# Patient Record
Sex: MALE | Race: OTHER | HISPANIC OR LATINO | Employment: OTHER | URBAN - METROPOLITAN AREA
[De-identification: names, ages, dates, MRNs, and addresses within clinical notes are randomized per-mention and may not be internally consistent; named-entity substitution may affect disease eponyms.]

---

## 2017-01-21 LAB
BASOPHILS # BLD AUTO: 34 CELLS/UL (ref 0–200)
BASOPHILS NFR BLD AUTO: 0.5 %
EOSINOPHIL # BLD AUTO: 102 CELLS/UL (ref 15–500)
EOSINOPHIL NFR BLD AUTO: 1.5 %
ERYTHROCYTE [DISTWIDTH] IN BLOOD BY AUTOMATED COUNT: 14.1 % (ref 11–15)
HCT VFR BLD AUTO: 44.7 % (ref 38.5–50)
HGB BLD-MCNC: 14.7 G/DL (ref 13.2–17.1)
LYMPHOCYTES # BLD AUTO: 1278 CELLS/UL (ref 850–3900)
LYMPHOCYTES NFR BLD AUTO: 18.8 %
MCH RBC QN AUTO: 30.1 PG (ref 27–33)
MCHC RBC AUTO-ENTMCNC: 32.8 G/DL (ref 32–36)
MCV RBC AUTO: 91.7 FL (ref 80–100)
MONOCYTES # BLD AUTO: 483 CELLS/UL (ref 200–950)
MONOCYTES NFR BLD AUTO: 7.1 %
NEUTROPHILS # BLD AUTO: 4903 CELLS/UL (ref 1500–7800)
NEUTROPHILS NFR BLD AUTO: 72.1 %
PLATELET # BLD AUTO: 203 THOUSAND/UL (ref 140–400)
PMV BLD REES-ECKER: 8.8 FL (ref 7.5–11.5)
RBC # BLD AUTO: 4.88 MILLION/UL (ref 4.2–5.8)
TSH SERPL-ACNC: 1.01 MIU/L (ref 0.4–4.5)
WBC # BLD AUTO: 6.8 THOUSAND/UL (ref 3.8–10.8)

## 2018-04-27 LAB
ABSOL LYMPHOCYTES (HISTORICAL): 1.3 K/UL (ref 0.5–4)
ALBUMIN SERPL BCP-MCNC: 3.9 G/DL (ref 3–5.2)
ALP SERPL-CCNC: 106 U/L (ref 43–122)
ALT SERPL W P-5'-P-CCNC: 61 U/L (ref 9–52)
ANION GAP SERPL CALCULATED.3IONS-SCNC: 11 MMOL/L (ref 5–14)
AST SERPL W P-5'-P-CCNC: 40 U/L (ref 17–59)
BASOPHILS # BLD AUTO: 0 % (ref 0–1)
BASOPHILS # BLD AUTO: 0 K/UL (ref 0–0.1)
BILIRUB SERPL-MCNC: 0.8 MG/DL
BUN SERPL-MCNC: 25 MG/DL (ref 5–25)
CALCIUM SERPL-MCNC: 8.5 MG/DL (ref 8.4–10.2)
CHLORIDE SERPL-SCNC: 103 MEQ/L (ref 97–108)
CHOLEST SERPL-MCNC: 122 MG/DL
CHOLEST/HDLC SERPL: 4.2 {RATIO}
CO2 SERPL-SCNC: 25 MMOL/L (ref 22–30)
COMMENT (HISTORICAL): ABNORMAL
COMMENT (HISTORICAL): ABNORMAL
CREATINE, SERUM (HISTORICAL): 1.18 MG/DL (ref 0.7–1.5)
DEPRECATED RDW RBC AUTO: 13.8 %
EGFR (HISTORICAL): >60 ML/MIN/1.73 M2
EOSINOPHIL # BLD AUTO: 0.1 K/UL (ref 0–0.4)
EOSINOPHIL NFR BLD AUTO: 1 % (ref 0–6)
GLUCOSE SERPL-MCNC: 90 MG/DL (ref 70–99)
HCT VFR BLD AUTO: 45.1 % (ref 41–53)
HDLC SERPL-MCNC: 29 MG/DL
HEPATITIS A IGM ANTIBODY (HISTORICAL): NORMAL
HEPATITIS B CORE IGM ANTIBODY (HISTORICAL): NORMAL
HEPATITIS B SURFACE AG CONFIRMATION (HISTORICAL): NORMAL
HEPATITIS C ANTIBODY (HISTORICAL): NORMAL
HGB BLD-MCNC: 14.7 G/DL (ref 13.5–17.5)
LDL/HDL RATIO (HISTORICAL): 2.2
LDLC SERPL CALC-MCNC: 65 MG/DL
LYMPHOCYTES NFR BLD AUTO: 22 % (ref 25–45)
MCH RBC QN AUTO: 30.2 PG (ref 26–34)
MCHC RBC AUTO-ENTMCNC: 32.7 % (ref 31–36)
MCV RBC AUTO: 92 FL (ref 80–100)
MONOCYTES # BLD AUTO: 0.6 K/UL (ref 0.2–0.9)
MONOCYTES NFR BLD AUTO: 10 % (ref 1–10)
NEUTROPHILS ABS COUNT (HISTORICAL): 4.1 K/UL (ref 1.8–7.8)
NEUTS SEG NFR BLD AUTO: 67 % (ref 45–65)
PLATELET # BLD AUTO: 166 K/MCL (ref 150–450)
POTASSIUM SERPL-SCNC: 3.5 MEQ/L (ref 3.6–5)
PROSTATE SPECIFIC ANTIGEN (HISTORICAL): 1.96 NG/ML
RBC # BLD AUTO: 4.89 M/MCL (ref 4.5–5.9)
RBC MORPHOLOGY (HISTORICAL): ABNORMAL
SODIUM SERPL-SCNC: 140 MEQ/L (ref 137–147)
TOTAL PROTEIN (HISTORICAL): 6.6 G/DL (ref 5.9–8.4)
TRIGL SERPL-MCNC: 142 MG/DL
TSH SERPL DL<=0.05 MIU/L-ACNC: 1.32 UIU/ML (ref 0.47–4.68)
VLDLC SERPL CALC-MCNC: 28 MG/DL (ref 0–40)
WBC # BLD AUTO: 6.1 K/MCL (ref 4.5–11)

## 2018-07-17 ENCOUNTER — OFFICE VISIT (OUTPATIENT)
Dept: FAMILY MEDICINE CLINIC | Facility: CLINIC | Age: 73
End: 2018-07-17
Payer: COMMERCIAL

## 2018-07-17 VITALS
BODY MASS INDEX: 33.31 KG/M2 | HEART RATE: 60 BPM | DIASTOLIC BLOOD PRESSURE: 70 MMHG | OXYGEN SATURATION: 98 % | SYSTOLIC BLOOD PRESSURE: 130 MMHG | TEMPERATURE: 98.1 F | WEIGHT: 188 LBS | HEIGHT: 63 IN | RESPIRATION RATE: 16 BRPM

## 2018-07-17 DIAGNOSIS — R30.0 DYSURIA: Primary | ICD-10-CM

## 2018-07-17 DIAGNOSIS — I10 ESSENTIAL HYPERTENSION: ICD-10-CM

## 2018-07-17 DIAGNOSIS — Z95.1 S/P CABG X 3: ICD-10-CM

## 2018-07-17 LAB
SL AMB  POCT GLUCOSE, UA: NORMAL
SL AMB LEUKOCYTE ESTERASE,UA: NORMAL
SL AMB POCT BILIRUBIN,UA: NORMAL
SL AMB POCT BLOOD,UA: NORMAL
SL AMB POCT CLARITY,UA: CLEAR
SL AMB POCT COLOR,UA: NORMAL
SL AMB POCT KETONES,UA: NORMAL
SL AMB POCT NITRITE,UA: NORMAL
SL AMB POCT PH,UA: 6.5
SL AMB POCT SPECIFIC GRAVITY,UA: 1
SL AMB POCT URINE PROTEIN: NORMAL
SL AMB POCT UROBILINOGEN: 0.2

## 2018-07-17 PROCEDURE — 99214 OFFICE O/P EST MOD 30 MIN: CPT | Performed by: FAMILY MEDICINE

## 2018-07-17 PROCEDURE — 81002 URINALYSIS NONAUTO W/O SCOPE: CPT | Performed by: FAMILY MEDICINE

## 2018-07-17 RX ORDER — CLOPIDOGREL BISULFATE 75 MG/1
1 TABLET ORAL EVERY 24 HOURS
COMMUNITY
Start: 2018-04-27 | End: 2019-07-01 | Stop reason: SDUPTHER

## 2018-07-17 RX ORDER — ATORVASTATIN CALCIUM 80 MG/1
1 TABLET, FILM COATED ORAL EVERY 24 HOURS
COMMUNITY
Start: 2018-04-27 | End: 2019-07-01 | Stop reason: SDUPTHER

## 2018-07-17 RX ORDER — FLUTICASONE PROPIONATE 50 MCG
SPRAY, SUSPENSION (ML) NASAL
Refills: 0 | COMMUNITY
Start: 2018-04-22 | End: 2018-09-20 | Stop reason: ALTCHOICE

## 2018-07-17 RX ORDER — CHLORAL HYDRATE 500 MG
1 CAPSULE ORAL EVERY 24 HOURS
COMMUNITY
Start: 2018-02-27 | End: 2019-01-24 | Stop reason: ALTCHOICE

## 2018-07-17 RX ORDER — METOPROLOL SUCCINATE 100 MG/1
1 TABLET, EXTENDED RELEASE ORAL EVERY 24 HOURS
COMMUNITY
Start: 2018-04-27 | End: 2019-07-11 | Stop reason: SDUPTHER

## 2018-07-17 RX ORDER — TAMSULOSIN HYDROCHLORIDE 0.4 MG/1
1 CAPSULE ORAL EVERY 24 HOURS
COMMUNITY
Start: 2018-04-27 | End: 2019-05-22 | Stop reason: SDUPTHER

## 2018-07-17 RX ORDER — VALSARTAN AND HYDROCHLOROTHIAZIDE 320; 12.5 MG/1; MG/1
1 TABLET, FILM COATED ORAL EVERY 24 HOURS
COMMUNITY
Start: 2018-04-27 | End: 2018-09-20 | Stop reason: ALTCHOICE

## 2018-07-17 RX ORDER — ASPIRIN 81 MG/1
1 TABLET, CHEWABLE ORAL EVERY 24 HOURS
COMMUNITY
Start: 2018-04-27 | End: 2019-07-11 | Stop reason: SDUPTHER

## 2018-07-17 NOTE — PROGRESS NOTES
Assessment/Plan:    No problem-specific Assessment & Plan notes found for this encounter  Diagnoses and all orders for this visit:    Dysuria  -     POCT urine dip    Other orders  -     aspirin 81 mg chewable tablet; Chew 1 tablet every 24 hours  -     atorvastatin (LIPITOR) 80 mg tablet; Take 1 tablet by mouth every 24 hours  -     Omega-3 Fatty Acids (FISH OIL) 1,000 mg; Take 1 capsule by mouth every 24 hours  -     tamsulosin (FLOMAX) 0 4 mg; Take 1 capsule by mouth every 24 hours  -     fluticasone (FLONASE) 50 mcg/act nasal spray; USE 2 SPRAYS IN EACH NOSTRIL D  -     metoprolol succinate (TOPROL-XL) 100 mg 24 hr tablet; Take 1 tablet by mouth every 24 hours  -     clopidogrel (PLAVIX) 75 mg tablet; Take 1 tablet by mouth every 24 hours  -     valsartan-hydrochlorothiazide (DIOVAN-HCT) 320-12 5 MG per tablet; Take 1 tablet by mouth every 24 hours  -     Cholecalciferol (VITAMIN D-3) 5000 units TABS; every 24 hours          Subjective:      Patient ID: Linda Romero is a 68 y o  male  Pt here for routine check  PT has no complaints  His wife reported the patient was having memory troubles and intention for this visit is to have a mini-mental exam   Patient denies any memory problems or any behavior problems he states that he is getting tired and does a one-to-one sitter to his wife  Hypertension   This is a recurrent problem  The current episode started more than 1 year ago  The problem has been gradually improving since onset  The problem is controlled  Pertinent negatives include no chest pain, headaches or shortness of breath  Risk factors for coronary artery disease include obesity, male gender and dyslipidemia  The current treatment provides moderate improvement         The following portions of the patient's history were reviewed and updated as appropriate: allergies, current medications, past family history, past medical history, past social history, past surgical history and problem list     Review of Systems   Constitutional: Negative for activity change, appetite change, fatigue and fever  HENT: Negative for congestion, dental problem, ear pain, sinus pain and trouble swallowing  Eyes: Negative for discharge, redness and itching  Respiratory: Negative for cough, shortness of breath and wheezing  Cardiovascular: Negative for chest pain  Gastrointestinal: Negative for abdominal distention and abdominal pain  Genitourinary: Negative for difficulty urinating, dysuria and enuresis  Musculoskeletal: Negative for arthralgias, back pain and gait problem  Neurological: Negative for dizziness and headaches  Hematological: Negative for adenopathy  Does not bruise/bleed easily  Psychiatric/Behavioral: Negative for behavioral problems  There is no history of childhood bulling in school  , drug use or physical abuse  Objective:      /70 (BP Location: Left arm, Patient Position: Sitting, Cuff Size: Standard)   Pulse 60   Temp 98 1 °F (36 7 °C) (Oral)   Resp 16   Ht 5' 3" (1 6 m)   Wt 85 3 kg (188 lb)   SpO2 98%   BMI 33 30 kg/m²          Physical Exam   Constitutional: He is oriented to person, place, and time  He appears well-developed and well-nourished  HENT:   Head: Normocephalic  Eyes: Pupils are equal, round, and reactive to light  Neck: Normal range of motion  Cardiovascular: Normal rate and regular rhythm  Pulmonary/Chest: Effort normal and breath sounds normal    Abdominal: Soft  Bowel sounds are normal    Genitourinary:   Genitourinary Comments: Exam of his testicles is normal   Musculoskeletal: Normal range of motion  He exhibits tenderness  Neurological: He is alert and oriented to person, place, and time  Skin: Skin is warm and dry  Psychiatric: He has a normal mood and affect   His speech is normal and behavior is normal  Judgment and thought content normal  Cognition and memory are normal    The mini-mental exam done today reported as score of 30  No signs of memory impairment  There is no signs of behavior disorder

## 2018-07-18 NOTE — ASSESSMENT & PLAN NOTE
Hypertension isn't very well controlled  Will continue same treatment  Mini-mental status exam was normal   Patient does not have any memory problems at this time

## 2018-08-21 ENCOUNTER — TELEPHONE (OUTPATIENT)
Dept: FAMILY MEDICINE CLINIC | Facility: CLINIC | Age: 73
End: 2018-08-21

## 2018-08-21 DIAGNOSIS — I10 ESSENTIAL HYPERTENSION, MALIGNANT: Primary | ICD-10-CM

## 2018-08-21 NOTE — TELEPHONE ENCOUNTER
Pt came to the office and stated he is taking valsartan HCTZ 320 mg/ 12 5 mg  He needs a replacement for the medication due to the recall  Please send to [address] [place] [address] pharmacy with the new medication  Call him when the change has been made

## 2018-08-22 RX ORDER — OLMESARTAN MEDOXOMIL AND HYDROCHLOROTHIAZIDE 40/12.5 40; 12.5 MG/1; MG/1
1 TABLET ORAL DAILY
Qty: 30 TABLET | Refills: 5 | Status: SHIPPED | OUTPATIENT
Start: 2018-08-22 | End: 2018-09-20 | Stop reason: SDUPTHER

## 2018-09-20 ENCOUNTER — OFFICE VISIT (OUTPATIENT)
Dept: CARDIOLOGY CLINIC | Facility: CLINIC | Age: 73
End: 2018-09-20
Payer: COMMERCIAL

## 2018-09-20 VITALS
WEIGHT: 190 LBS | BODY MASS INDEX: 33.66 KG/M2 | HEART RATE: 56 BPM | HEIGHT: 63 IN | DIASTOLIC BLOOD PRESSURE: 72 MMHG | OXYGEN SATURATION: 98 % | SYSTOLIC BLOOD PRESSURE: 124 MMHG

## 2018-09-20 DIAGNOSIS — I25.10 CORONARY ARTERY DISEASE INVOLVING NATIVE CORONARY ARTERY OF NATIVE HEART WITHOUT ANGINA PECTORIS: Primary | ICD-10-CM

## 2018-09-20 DIAGNOSIS — Z95.1 S/P CABG X 3: ICD-10-CM

## 2018-09-20 DIAGNOSIS — E78.00 PURE HYPERCHOLESTEROLEMIA: ICD-10-CM

## 2018-09-20 DIAGNOSIS — I10 ESSENTIAL HYPERTENSION, MALIGNANT: ICD-10-CM

## 2018-09-20 DIAGNOSIS — I10 HYPERTENSION, UNSPECIFIED TYPE: ICD-10-CM

## 2018-09-20 DIAGNOSIS — R06.02 SOB (SHORTNESS OF BREATH): ICD-10-CM

## 2018-09-20 DIAGNOSIS — I25.2 OLD MYOCARDIAL INFARCTION: ICD-10-CM

## 2018-09-20 PROCEDURE — 99214 OFFICE O/P EST MOD 30 MIN: CPT | Performed by: INTERNAL MEDICINE

## 2018-09-20 PROCEDURE — 93000 ELECTROCARDIOGRAM COMPLETE: CPT | Performed by: INTERNAL MEDICINE

## 2018-09-20 NOTE — PROGRESS NOTES
Cardiology Follow Up    [facility]  1945  [id number]  [address] [facility]  [address] [place], [facility] [address] [phone]    1  Hypertension, unspecified type  POCT ECG    Echo complete with contrast if indicated   2  SOB (shortness of breath)  Echo complete with contrast if indicated   3  S/P CABG x 3     4  Coronary artery disease involving native coronary artery of native heart without angina pectoris     5  Pure hypercholesterolemia     6  Old myocardial infarction         Patient was 1st seen in our office on March 5, 2013 for midsternal chest discomfort and dyspnea on exertion  His electrocardiogram demonstrated old inferior wall myocardial infarction with possible lateral extension  Other diagnoses include hypertension, metabolic syndrome, hyperlipidemia  Cardiac catheterization demonstrated a 70 80% left anterior after the 2nd diagonal branch  Circumflex artery minor luminal irregularities  Right coronary artery dominant in distribution with a proximal 90% lesion  Patient underwent coronary artery bypass surgery with a LIMA to the LAD, SVG to the right coronary artery, SVG to the OM branch of the circumflex  03/19/2013 echocardiogram:  Normal left ventricular systolic and diastolic function  Mildly elevated left ventricular filling pressures  Aortic sclerosis with mild aortic regurgitation  Moderately severe concentric left ventricular hypertrophy  10/21/2017 lipid profile:  Cholesterol 144, triglycerides 115, HDL 39, LDL calculated 82  Interval History:   Patient returns having no cardiac complaints  Although on further questioning, he states he has some shortness of breath on exertion and he feels that is worse than it was 6 months ago  Blood pressure is well controlled at 124/72  Patient denies chest discomfort or shortness of breath    There are no symptoms of palpitations, lightheadedness, near-syncope or syncope  Patient Active Problem List   Diagnosis    CAD (coronary artery disease)    Diverticulosis    History of erectile dysfunction    History of tobacco use    Hypertension    Hyperlipidemia    Old myocardial infarction    S/P CABG x 3    Thrombocytopenia (HCC)     Past Medical History:   Diagnosis Date    CAD (coronary artery disease)     Diverticulosis     Hyperlipidemia     Hypertension     Obesity      Social History     Social History    Marital status: /Civil Union     Spouse name: N/A    Number of children: N/A    Years of education: N/A     Occupational History    Not on file       Social History Main Topics    Smoking status: Former Smoker     Types: Cigarettes     Quit date: 1/1/2012    Smokeless tobacco: Never Used    Alcohol use Yes    Drug use: No    Sexual activity: Yes     Partners: Female     Other Topics Concern    Not on file     Social History Narrative    No narrative on file      Family History   Problem Relation Age of Onset    Hypertension Mother      Past Surgical History:   Procedure Laterality Date    CARDIOVASCULAR STRESS TEST  03/20/2013    COLONOSCOPY      onset: 9/21/9    COLONOSCOPY W/ POLYPECTOMY      2/6/17:colon,polyp      CORONARY ARTERY BYPASS GRAFT  01/21/2016    x3       Current Outpatient Prescriptions:     aspirin 81 mg chewable tablet, Chew 1 tablet every 24 hours, Disp: , Rfl:     atorvastatin (LIPITOR) 80 mg tablet, Take 1 tablet by mouth every 24 hours, Disp: , Rfl:     clopidogrel (PLAVIX) 75 mg tablet, Take 1 tablet by mouth every 24 hours, Disp: , Rfl:     metoprolol succinate (TOPROL-XL) 100 mg 24 hr tablet, Take 1 tablet by mouth every 24 hours, Disp: , Rfl:     olmesartan-hydrochlorothiazide (BENICAR HCT) 40-12 5 MG per tablet, Take 1 tablet by mouth daily for 30 days, Disp: 30 tablet, Rfl: 5    Omega-3 Fatty Acids (FISH OIL) 1,000 mg, Take 1 capsule by mouth every 24 hours, Disp: , Rfl:     tamsulosin (FLOMAX) 0 4 mg, Take 1 capsule by mouth every 24 hours, Disp: , Rfl:   Allergies   Allergen Reactions    No Active Allergies        Results for orders placed or performed in visit on 09/20/18   POCT ECG    Narrative    Sinus bradycardia with premature atrial complexes  Old inferior wall myocardial infarction  First degree AV block  Lab Results   Component Value Date    CHOL 122 04/27/2018     Lab Results   Component Value Date    HDL 29 (L) 04/27/2018     Lab Results   Component Value Date    LDLCALC 65 04/27/2018     Lab Results   Component Value Date    TRIG 142 04/27/2018     No components found for: [place], [place]  Lab Results   Component Value Date    GLUCOSE 90 04/27/2018    CALCIUM 8 5 04/27/2018     04/27/2018    K 3 5 (L) 04/27/2018    CO2 25 04/27/2018     04/27/2018    BUN 25 04/27/2018     Lab Results   Component Value Date     04/27/2018    K 3 5 (L) 04/27/2018     04/27/2018    CO2 25 04/27/2018    ANIONGAP 11 04/27/2018    BUN 25 04/27/2018    GLUCOSE 90 04/27/2018    CALCIUM 8 5 04/27/2018    AST 40 04/27/2018    ALT 61 (H) 04/27/2018    ALKPHOS 106 04/27/2018    PROT 6 6 04/27/2018    BILITOT 0 8 04/27/2018     Lab Results   Component Value Date    WBC 6 1 04/27/2018    HGB 14 7 04/27/2018    HCT 45 1 04/27/2018    MCV 92 04/27/2018     04/27/2018     Lab Results   Component Value Date    VHW8NVLRLRNC 1 320 04/27/2018             Review of Systems:  Review of Systems   Constitutional: Negative  HENT: Negative  Eyes: Negative  Respiratory: Negative for chest tightness and shortness of breath  Cardiovascular: Negative for chest pain, palpitations and leg swelling  Gastrointestinal: Negative  Endocrine: Negative  Musculoskeletal: Negative  Skin: Negative  Allergic/Immunologic: Negative  Neurological: Negative  Hematological: Negative  Psychiatric/Behavioral: Negative          Physical Exam:  BP 124/72 (BP Location: Left arm, Patient Position: Sitting, Cuff Size: Large)   Pulse 56   Ht 5' 3" (1 6 m)   Wt 86 2 kg (190 lb)   SpO2 98%   BMI 33 66 kg/m²    Physical Exam   Constitutional: He is oriented to person, place, and time  He appears well-developed and well-nourished  HENT:   Head: Normocephalic and atraumatic  Neck: Normal range of motion  Neck supple  No JVD present  No tracheal deviation present  Cardiovascular: Normal rate, regular rhythm, normal heart sounds and intact distal pulses  Pulmonary/Chest: Effort normal and breath sounds normal  No respiratory distress  He has no wheezes  He has no rales  Abdominal: Soft  Bowel sounds are normal    Musculoskeletal: He exhibits no edema  Neurological: He is alert and oriented to person, place, and time  Skin: Skin is warm and dry  Psychiatric: He has a normal mood and affect  His behavior is normal        Discussion/Summary:  1  Coronary artery disease, status post CABG x3, asymptomatic  2  Hypertension  3  Hyperlipidemia  4  Deconditioning  Plan:  1  Recommend the patient get regular exercise and in the hot weather got cold to a mall and walk around  He should exercise 5 days a week  2   Echocardiogram  3    Return in 4 months

## 2018-09-21 RX ORDER — OLMESARTAN MEDOXOMIL AND HYDROCHLOROTHIAZIDE 40/12.5 40; 12.5 MG/1; MG/1
1 TABLET ORAL DAILY
Qty: 90 TABLET | Refills: 1 | Status: SHIPPED | OUTPATIENT
Start: 2018-09-21 | End: 2019-01-07 | Stop reason: ALTCHOICE

## 2018-10-04 ENCOUNTER — HOSPITAL ENCOUNTER (OUTPATIENT)
Dept: NON INVASIVE DIAGNOSTICS | Facility: HOSPITAL | Age: 73
Discharge: HOME | End: 2018-10-04
Payer: COMMERCIAL

## 2018-10-04 DIAGNOSIS — I10 HYPERTENSION, UNSPECIFIED TYPE: ICD-10-CM

## 2018-10-04 DIAGNOSIS — R06.02 SOB (SHORTNESS OF BREATH): ICD-10-CM

## 2018-10-04 PROCEDURE — 93306 TTE W/DOPPLER COMPLETE: CPT | Performed by: INTERNAL MEDICINE

## 2018-10-04 PROCEDURE — 93306 TTE W/DOPPLER COMPLETE: CPT

## 2018-11-09 ENCOUNTER — OFFICE VISIT (OUTPATIENT)
Dept: FAMILY MEDICINE CLINIC | Facility: CLINIC | Age: 73
End: 2018-11-09
Payer: COMMERCIAL

## 2018-11-09 VITALS
WEIGHT: 195 LBS | DIASTOLIC BLOOD PRESSURE: 80 MMHG | BODY MASS INDEX: 34.55 KG/M2 | HEIGHT: 63 IN | SYSTOLIC BLOOD PRESSURE: 150 MMHG | OXYGEN SATURATION: 95 % | TEMPERATURE: 98 F | RESPIRATION RATE: 16 BRPM | HEART RATE: 62 BPM

## 2018-11-09 DIAGNOSIS — R00.1 BRADYCARDIA: ICD-10-CM

## 2018-11-09 DIAGNOSIS — Z23 NEEDS FLU SHOT: Primary | ICD-10-CM

## 2018-11-09 DIAGNOSIS — I10 ESSENTIAL HYPERTENSION: ICD-10-CM

## 2018-11-09 DIAGNOSIS — R73.9 HYPERGLYCEMIA: ICD-10-CM

## 2018-11-09 PROCEDURE — 90662 IIV NO PRSV INCREASED AG IM: CPT

## 2018-11-09 PROCEDURE — G0008 ADMIN INFLUENZA VIRUS VAC: HCPCS

## 2018-11-09 PROCEDURE — 4040F PNEUMOC VAC/ADMIN/RCVD: CPT

## 2018-11-09 PROCEDURE — 96372 THER/PROPH/DIAG INJ SC/IM: CPT | Performed by: FAMILY MEDICINE

## 2018-11-09 PROCEDURE — 99214 OFFICE O/P EST MOD 30 MIN: CPT | Performed by: FAMILY MEDICINE

## 2018-11-09 NOTE — PROGRESS NOTES
Assessment/Plan:  1  Bradycardia  Condition is stable with current treatment, to  continue the same  Back to normal HR with Multiples PAC's more than previously  - POCT ECG  - Magnesium; Future    2  Needs flu shot    - influenza vaccine, [id number], high-dose, PF 0 5 mL, for patients 72 yr+ (FLUZONE HIGH-DOSE)    3  Essential hypertension  Patient has h/o CABG, his life is more sedentary and does not aggregates a better prognosis to his future  Poor exercise, to get to gym daily since he has a membership    patient got explanation of the risks associated with HTN and the need for adequate control and adherence to therapy  Explained to patient that therapeutic measure is lifelong lifestyle modification including:  Sodium reduction (<2 g/day)  Dietary Approaches to Stop Hypertension (DASH) diet (3 servings of fruit and vegetables daily, whole grains, low sodium, low-fat proteins)   Weight loss to BMI under 30 kg/m^2  Increased physical activity: 3 to 5 times/week of daily aerobic exercise for 30- to 50-minute sessions as tolerated   Limited alcohol consumption less than 5 drinks for week  Patient Understood this basic measures and committed to make changes in his life style  - Comprehensive metabolic panel; Future  - Lipid Panel with Direct LDL reflex; Future    4  Hyperglycemia  Patient has elevation of blood sugar without diagnosis of diabetes  Are going to check hemoglobin A1c today  - Hemoglobin A1C; Future    No problem-specific Assessment & Plan notes found for this encounter  Diagnoses and all orders for this visit:    Needs flu shot  -     influenza vaccine, [id number], high-dose, PF 0 5 mL, for patients 65 yr+ (FLUZONE HIGH-DOSE)    Bradycardia  -     POCT ECG          Subjective:      Patient ID: Dar Patino is a 68 y o  male  Pt here for routine followup and for complaints of increased left knee pain      Hypertension   Associated symptoms include shortness of breath   Pertinent negatives include no chest pain or headaches  Knee Pain    The pain is present in the left knee  The pain is at a severity of 4/10  The pain is moderate  The pain has been fluctuating since onset  The symptoms are aggravated by movement  He has tried acetaminophen for the symptoms  The treatment provided no relief  The following portions of the patient's history were reviewed and updated as appropriate: allergies, current medications, past family history, past medical history, past social history, past surgical history and problem list       Review of Systems   Constitutional: Positive for unexpected weight change (gaining 7 lbs since last 3 months ago visit  Correlates with the starting of retairment  )  Negative for activity change, appetite change, fatigue and fever  HENT: Negative for congestion, dental problem, ear pain, sinus pain and trouble swallowing  Eyes: Negative for discharge, redness and itching  Respiratory: Positive for shortness of breath  Negative for cough and wheezing  Cardiovascular: Negative for chest pain  Gastrointestinal: Negative for abdominal distention and abdominal pain  Abdominal obesity  Genitourinary: Negative for difficulty urinating, dysuria and enuresis  Musculoskeletal: Negative for arthralgias, back pain and gait problem  Left knee pain   Neurological: Negative for dizziness and headaches  Hematological: Negative for adenopathy  Does not bruise/bleed easily  Psychiatric/Behavioral: Negative for behavioral problems  Objective:      /80 (BP Location: Left arm, Patient Position: Sitting, Cuff Size: Standard)   Pulse 62   Temp 98 °F (36 7 °C) (Oral)   Resp 16   Ht 5' 3" (1 6 m)   Wt 88 5 kg (195 lb)   SpO2 95%   BMI 34 54 kg/m²          Physical Exam   Constitutional: He is oriented to person, place, and time  Cardiovascular: Normal rate, regular rhythm and normal heart sounds      Pulmonary/Chest: Effort normal and breath sounds normal    Abdominal: Soft  Bowel sounds are normal  He exhibits distension (poor muscle wall, waisting (cause of protuberance))  Musculoskeletal: He exhibits tenderness (of back and joints, mildly to exam )  Neurological: He is alert and oriented to person, place, and time  He has normal reflexes  Skin: Skin is warm and dry  Psychiatric: He has a normal mood and affect   His behavior is normal  Judgment and thought content normal

## 2018-11-09 NOTE — PATIENT INSTRUCTIONS
Actividad sin levantamiento de peso   LO QUE NECESITA SABER:   La actividad sin levantamiento de peso es el ejercicio o movimiento que puede hacer sin apoyarse en stephens propio peso  Por ejemplo, stephens peso se apoya en el agua cuando nada  Las actividades sin levantamiento de peso no provocarán un impacto o esfuerzo  Usted puede aumentar stephens fuerza, flexibilidad, brenda cardiovascular y equilibrio con las actividades sin levantamiento de peso  Usted también puede [employer] músculos se pongan tensos o cortos después de grady lesión  Es posible que usted necesite realizar actividades sin levantamiento de peso hasta que grady herida sane, o es posible que usted necesite continuar si tiene grady condición a prolongada  Stephens médico le indicará por cuánto tiempo continuar haciendo actividades sin levantamiento de [place]  INSTRUCCIONES SOBRE [facility]:   Pregúntele a stephens Gustavo German vitaminas y minerales son adecuados para usted  · Stephens dolor empeora  · Usted tiene un dolor nuevo  · Usted tiene preguntas o inquietudes acerca de stephens condición o cuidado    Por qué usted podría necesitar actividades sin levantamiento de peso:   · Reposo de grady o ambas piernas o rodillas después de grady lesión o grady [place]    · Huesos débiles o frágiles que se quebrarían si usted pusiera demasiado peso en ellos    · El dolor en la articulación debido al sobrepeso o a la artritis hacen que las actividades con levantamiento de peso zahida dolorosas    · Edad avanzada    · Movilidad limitada en jose a piernas    · Falta de cartílago en grady o ambas rodillas    · Complicaciones de la diabetes brent la neuropatía aumentan el riesgo de caídas y hacen difícil caminar    · Dolor de espalda a michaela plazo  Ejemplos de actividades sin levantamiento de peso:   · Nadar, aeróbicos acuáticos o remar    · Pasearse en bicicleta o usar grady bicicleta estacionaria    · Levantar pesas o usar bandas de resistencia mientras está sentado    · Usar grady bicicleta de [place] solo para trabajar la parte superior del cuerpo    · Ejercicios de rango de movimiento para flexibilidad de las articulaciones    · Ejercicios isométricos que contraen y relajan los músculos varias veces seguidas    · Ciertas poses de yoga que no necesitan que apoye stephens peso  Acuda a la terapia física brent se le indique: Un fisioterapeuta puede enseñarle actividades sin levantamiento de peso para [employer] músculos y mejorar el equilibrio  Él le mostrará cómo evitar lesiones y disminuir stephens riesgo de caídas josé manuel las actividades sin levantamiento de [place]  Por ejemplo, le puede [employer] usar dispositivos de apoyo brent muletas para apoyar stephens peso  También le ayudará a planear actividades sin levantamiento de peso que aumenten stephens ritmo cardíaco para ejercicios cardiovasculares  Los adultos deberían tratar de hacer al menos 150 minutos de ejercicio a la semana  Lo que necesita saber acerca de la seguridad:   · Deténgase si siente dolor  No mueva stephens articulación más allá del rango normal de movimiento  Repose jose a articulaciones josé manuel un ataque si usted tiene grady enfermedad brent la artritis  No levante demasiado peso a menos que usted pueda [employer]  Es posible que usted necesite trabajar progresivamente para usar [employer]  Hable con stephens médico si siente dolor josé manuel jose a actividades  · Muévase lenta y suavemente  No realice movimientos rápidos o bruscos  · Use dispositivos de apoyo brent se le indique  Apoye stephens peso en [place], grady caminadora u otros dispositivos brent se lo indique stephens médico      · Realice grady variedad de actividades  La misma actividad cada día podría lesionar un músculo o articulación  Usted podría entrenar de más algunos músculos y no entrenar otros lo suficiente  · Hable con stephens médico acerca de las actividades seguras josé manuel el [place]  Es posible que usted no pueda levantar pesos [address]   Stephens médico puede ayudarlo a planear actividades sin levantamiento de peso de Ada Velazco  © 2017 [address] Information is for End User's use only and may not be sold, redistributed or otherwise used for commercial purposes  All illustrations and images included in CareNotes® are the copyrighted property of A D A M , Inc  or Gonzalo Mcfadden  Esta información es sólo para uso en educación  Johnson intención no es darle un consejo médico sobre enfermedades o tratamientos  Colsulte con johnson Charna Heckler farmacéutico antes de seguir cualquier régimen médico para saber si es seguro y efectivo para usted

## 2018-11-11 PROCEDURE — 93000 ELECTROCARDIOGRAM COMPLETE: CPT | Performed by: FAMILY MEDICINE

## 2018-11-12 ENCOUNTER — APPOINTMENT (OUTPATIENT)
Dept: LAB | Facility: HOSPITAL | Age: 73
End: 2018-11-12
Payer: COMMERCIAL

## 2018-11-12 DIAGNOSIS — R73.9 HYPERGLYCEMIA: ICD-10-CM

## 2018-11-12 DIAGNOSIS — I10 ESSENTIAL HYPERTENSION: ICD-10-CM

## 2018-11-12 DIAGNOSIS — R00.1 BRADYCARDIA: ICD-10-CM

## 2018-11-12 LAB
ALBUMIN SERPL BCP-MCNC: 3.8 G/DL (ref 3–5.2)
ALP SERPL-CCNC: 114 U/L (ref 43–122)
ALT SERPL W P-5'-P-CCNC: 41 U/L (ref 9–52)
ANION GAP SERPL CALCULATED.3IONS-SCNC: 8 MMOL/L (ref 5–14)
AST SERPL W P-5'-P-CCNC: 26 U/L (ref 17–59)
BILIRUB SERPL-MCNC: 0.6 MG/DL
BUN SERPL-MCNC: 19 MG/DL (ref 5–25)
CALCIUM SERPL-MCNC: 8.9 MG/DL (ref 8.4–10.2)
CHLORIDE SERPL-SCNC: 108 MMOL/L (ref 97–108)
CHOLEST SERPL-MCNC: 130 MG/DL
CO2 SERPL-SCNC: 28 MMOL/L (ref 22–30)
CREAT SERPL-MCNC: 1.18 MG/DL (ref 0.7–1.5)
EST. AVERAGE GLUCOSE BLD GHB EST-MCNC: 134 MG/DL
GFR SERPL CREATININE-BSD FRML MDRD: 61 ML/MIN/1.73SQ M
GLUCOSE P FAST SERPL-MCNC: 109 MG/DL (ref 70–99)
HBA1C MFR BLD: 6.3 % (ref 4.2–6.3)
HDLC SERPL-MCNC: 32 MG/DL (ref 40–59)
LDLC SERPL CALC-MCNC: 81 MG/DL
MAGNESIUM SERPL-MCNC: 2 MG/DL (ref 1.6–2.3)
POTASSIUM SERPL-SCNC: 4.4 MMOL/L (ref 3.6–5)
PROT SERPL-MCNC: 6.7 G/DL (ref 5.9–8.4)
SODIUM SERPL-SCNC: 144 MMOL/L (ref 137–147)
TRIGL SERPL-MCNC: 85 MG/DL

## 2018-11-12 PROCEDURE — 80061 LIPID PANEL: CPT

## 2018-11-12 PROCEDURE — 36415 COLL VENOUS BLD VENIPUNCTURE: CPT

## 2018-11-12 PROCEDURE — 83036 HEMOGLOBIN GLYCOSYLATED A1C: CPT

## 2018-11-12 PROCEDURE — 80053 COMPREHEN METABOLIC PANEL: CPT

## 2018-11-12 PROCEDURE — 83735 ASSAY OF MAGNESIUM: CPT

## 2018-11-13 ENCOUNTER — TELEPHONE (OUTPATIENT)
Dept: FAMILY MEDICINE CLINIC | Facility: CLINIC | Age: 73
End: 2018-11-13

## 2018-11-13 NOTE — TELEPHONE ENCOUNTER
Left message to patient to call back regarding below task    ----- Message from Tavares Baltazar MD sent at 11/12/2018  4:15 PM EST -----  Normal labs please call patient

## 2019-01-07 ENCOUNTER — OFFICE VISIT (OUTPATIENT)
Dept: FAMILY MEDICINE CLINIC | Facility: CLINIC | Age: 74
End: 2019-01-07
Payer: COMMERCIAL

## 2019-01-07 VITALS
DIASTOLIC BLOOD PRESSURE: 70 MMHG | OXYGEN SATURATION: 95 % | TEMPERATURE: 98.1 F | BODY MASS INDEX: 35.08 KG/M2 | SYSTOLIC BLOOD PRESSURE: 138 MMHG | HEART RATE: 60 BPM | RESPIRATION RATE: 16 BRPM | HEIGHT: 63 IN | WEIGHT: 198 LBS

## 2019-01-07 DIAGNOSIS — M54.6 ACUTE RIGHT-SIDED THORACIC BACK PAIN: Primary | ICD-10-CM

## 2019-01-07 PROCEDURE — 99213 OFFICE O/P EST LOW 20 MIN: CPT | Performed by: FAMILY MEDICINE

## 2019-01-07 PROCEDURE — 3725F SCREEN DEPRESSION PERFORMED: CPT | Performed by: FAMILY MEDICINE

## 2019-01-07 RX ORDER — OLMESARTAN MEDOXOMIL AND HYDROCHLOROTHIAZIDE 40/12.5 40; 12.5 MG/1; MG/1
TABLET ORAL
COMMUNITY
Start: 2018-12-22 | End: 2019-07-01 | Stop reason: SDUPTHER

## 2019-01-07 NOTE — PATIENT INSTRUCTIONS
Espasmo muscular   LO QUE NECESITA SABER:   Un espasmo muscular es grady contracción convulsiva involuntaria de cualquier músculo o de un michell de músculos  Un calambre muscular es un espasmo muscular muy doloroso  Los calambres musculares generalmente ocurren después del ejercicio intenso o josé manuel el [facility]  Estos también pueden ser provocados por ciertos medicamentos, la deshidratación, bajos niveles de calcio o magnesio u otras condiciones de [place]  Fredy Nares [facility]:   Medicamentos:  Usted podría  necesitar lo siguiente:  · AINEs (Analgésicos antiinflamatorios no esteroides)  pueden disminuir la inflamación y el dolor o la fiebre  Sugar medicamento esta disponible con o sin grady receta médica  Los AINEs pueden causar sangrado estomacal o problemas renales en ciertas personas  Si usted elva un medicamento anticoagulante, siempre pregúntele a [place] médico si los NATALI son seguros para usted  Siempre vanessa la etiqueta de sugar medicamento y [place] instrucciones  · [place] jose a medicamentos brent se le haya indicado  Consulte con [place] médico si usted graciela que [place] medicamento no le está ayudando o si presenta efectos secundarios  Infórmele si es alérgico a algún medicamento  Mantenga grady lista actualizada de los [place], las vitaminas y los productos herbales que elva  Incluya los siguientes datos de los medicamentos: cantidad, frecuencia y motivo de administración  Traiga con usted la lista o los envases de la píldoras a jose a citas de seguimiento  Lleve la lista de los medicamentos con usted en hailey de grady emergencia  Acuda a jose a consultas de control con [place] médico según le indicaron  Usted puede necesitar otros exámenes o tratamientos  También es posible que lo refieran a un fisioterapeuta u otro especialista  Anote jose a preguntas para que se acuerde de hacerlas josé manuel jose a visitas  Cuidados personales:   · El estiramiento  de [place] músculo ayuda a aliviar el calambre   También puede ser de Dami Camara mantener el músculo estirado hasta que el calambre desaparezca  · Aplique calor  para ayudar a disminuir el dolor y el espasmo muscular  Aplíquese calor en el área lesionada josé manuel 20 a 30 minutos cada 2 horas josé manuel la cantidad de [employer] indiquen  · Aplique hielo  para ayudar a disminuir la inflamación y el dolor  El hielo también puede contribuir a evitar el daño de los tejidos  Use un paquete de hielo o ponga hielo molido dentro de [employer]  Envuelva la compresa o bolsa con grady toalla y colóquela sobre [place] músculo por 15 a 20 minutos cada hora o brent se lo indicaron  · Consuma más líquidos  para ayudar a prevenir espasmos musculares causados por la deshidratación  Las bebidas atléticas pueden ayudar a reemplazar los electrolitos que pierde josé manuel el ejercicio por la sudoración  Pregunte a [place] médico sobre la cantidad de líquido que necesita letty todos los días y cuáles le recomienda  · Consuma alimentos saludables , brent frutas, verduras, granos integrales, productos lácteos bajos en grasa y proteínas bajas en grasa (carne Jay Maker, legumbres y pescado)  Si está embarazada, pregúntele a [place] médico qué alimentos son ricos en magnesio y [place]  Estos pueden ayudar para [employer] calambres josé manuel el [place]  · Dé masajes suaves sobre el músculo  para aliviar el calambre  · Respire profundo varias veces  hasta que el calambre se mejore  Acuéstese mientras respira profundo para que no sufra un mareo o desvanecimiento  Pregúntele a step[place] Sushila Gault vitaminas y minerales son adecuados para usted  · Usted presenta signos de deshidratación, brent dolor de [place] de color amarillo oscuro, ojos o boca secos o latidos cardíacos rápidos  · Usted tiene preguntas o inquietudes acerca de [place] condición o cuidado  Regrese a la lizeth de emergencias si:   · El músculo con el calambre está caliente al tacto, inflamado o enrojecido       · Usted sufre con frecuencia y sin alivio de calambres musculares en varios músculos diferentes  · Usted presenta calambres musculares con entumecimiento, cosquilleo y sensación quemante en joes a shireen y pies  © 2017 [address] Information is for End User's use only and may not be sold, redistributed or otherwise used for commercial purposes  All illustrations and images included in CareNotes® are the copyrighted property of A ANIYAH A M , Inc  or Gonzalo Mcfadden  Esta información es sólo para uso en educación  Johnson intención no es darle un consejo médico sobre enfermedades o tratamientos  Colsulte con ojhnson Ozie Sharp farmacéutico antes de seguir cualquier régimen médico para saber si es seguro y efectivo para usted

## 2019-01-07 NOTE — PROGRESS NOTES
Assessment/Plan:  1  Acute right-sided thoracic back pain  This is a muscle spasm related to gym exercise, I explained to patient the not sure of the current problem, he will continue with exercising in the daily basis  Swimming exercise was encouraged also  No problem-specific Assessment & Plan notes found for this encounter  There are no diagnoses linked to this encounter  Subjective:      Patient ID: Sachin Moore is a 68 y o  male  Pt walked in to office with complaints of back pain      Back Pain   This is a new problem  The current episode started in the past 7 days (After gym exercise)  The problem occurs constantly  The problem is unchanged  The pain is present in the thoracic spine  The quality of the pain is described as aching  The pain is at a severity of 4/10  The pain is moderate  The symptoms are aggravated by bending, lying down, position and twisting  Pertinent negatives include no abdominal pain, chest pain, dysuria, fever or headaches  Risk factors include obesity  He has tried nothing for the symptoms  Hypertension   Pertinent negatives include no chest pain, headaches or shortness of breath  The following portions of the patient's history were reviewed and updated as appropriate: allergies, current medications, past family history, past medical history, past social history, past surgical history and problem list     Review of Systems   Constitutional: Negative for activity change, appetite change, fatigue and fever  HENT: Negative for congestion, dental problem, ear pain, sinus pain and trouble swallowing  Eyes: Negative for discharge, redness and itching  Respiratory: Negative for cough, shortness of breath and wheezing  Cardiovascular: Negative for chest pain  Gastrointestinal: Negative for abdominal distention and abdominal pain  Genitourinary: Negative for difficulty urinating, dysuria and enuresis  Musculoskeletal: Positive for back pain   Negative for arthralgias and gait problem  Neurological: Negative for dizziness and headaches  Hematological: Negative for adenopathy  Does not bruise/bleed easily  Psychiatric/Behavioral: Negative for behavioral problems  Objective:      /70 (BP Location: Left arm, Patient Position: Sitting, Cuff Size: Standard)   Pulse 60   Temp 98 1 °F (36 7 °C) (Oral)   Resp 16   Ht 5' 3" (1 6 m)   Wt 89 8 kg (198 lb)   SpO2 95%   BMI 35 07 kg/m²          Physical Exam   Constitutional: He is oriented to person, place, and time  He appears well-developed and well-nourished  HENT:   Head: Normocephalic  Eyes: Pupils are equal, round, and reactive to light  EOM are normal    Neck: Neck supple  Cardiovascular: Normal rate, regular rhythm and normal heart sounds  Pulmonary/Chest: Effort normal and breath sounds normal    Abdominal: Soft  Bowel sounds are normal    Musculoskeletal: Normal range of motion  He exhibits tenderness (thoracic spine and right side)  Neurological: He is alert and oriented to person, place, and time  He has normal reflexes  Skin: Skin is warm and dry  Psychiatric: He has a normal mood and affect   His behavior is normal  Judgment and thought content normal

## 2019-01-24 ENCOUNTER — OFFICE VISIT (OUTPATIENT)
Dept: CARDIOLOGY CLINIC | Facility: CLINIC | Age: 74
End: 2019-01-24
Payer: COMMERCIAL

## 2019-01-24 VITALS
SYSTOLIC BLOOD PRESSURE: 112 MMHG | HEIGHT: 63 IN | WEIGHT: 194.4 LBS | OXYGEN SATURATION: 94 % | HEART RATE: 63 BPM | BODY MASS INDEX: 34.45 KG/M2 | DIASTOLIC BLOOD PRESSURE: 70 MMHG

## 2019-01-24 DIAGNOSIS — Z95.1 S/P CABG X 3: ICD-10-CM

## 2019-01-24 DIAGNOSIS — I25.10 CORONARY ARTERY DISEASE INVOLVING NATIVE CORONARY ARTERY OF NATIVE HEART WITHOUT ANGINA PECTORIS: Primary | ICD-10-CM

## 2019-01-24 DIAGNOSIS — I10 ESSENTIAL HYPERTENSION: ICD-10-CM

## 2019-01-24 DIAGNOSIS — E78.00 PURE HYPERCHOLESTEROLEMIA: ICD-10-CM

## 2019-01-24 PROCEDURE — 93000 ELECTROCARDIOGRAM COMPLETE: CPT | Performed by: INTERNAL MEDICINE

## 2019-01-24 PROCEDURE — 99214 OFFICE O/P EST MOD 30 MIN: CPT | Performed by: INTERNAL MEDICINE

## 2019-01-24 RX ORDER — EZETIMIBE 10 MG/1
10 TABLET ORAL DAILY
Qty: 30 TABLET | Refills: 5 | Status: SHIPPED | OUTPATIENT
Start: 2019-01-24 | End: 2019-07-11 | Stop reason: SDUPTHER

## 2019-01-24 NOTE — PROGRESS NOTES
Cardiology Follow Up    [facility]  1945  [id number]  [address] [facility]  [address] [place] [id number] [phone]    1  Coronary artery disease involving native coronary artery of native heart without angina pectoris  POCT ECG   2  S/P CABG x 3     3  Essential hypertension     4  Pure hypercholesterolemia         Patient was 1st seen in our office on March 5, 2013 for midsternal chest discomfort and dyspnea on exertion  His electrocardiogram demonstrated old inferior wall myocardial infarction with possible lateral extension  Other diagnoses include hypertension, metabolic syndrome, hyperlipidemia  Cardiac catheterization demonstrated a 70 80% left anterior after the 2nd diagonal branch  Circumflex artery minor luminal irregularities  Right coronary artery dominant in distribution with a proximal 90% lesion  Patient underwent coronary artery bypass surgery with a LIMA to the LAD, SVG to the right coronary artery, SVG to the OM branch of the circumflex  03/19/2013 echocardiogram:  Normal left ventricular systolic and diastolic function  Mildly elevated left ventricular filling pressures  Aortic sclerosis with mild aortic regurgitation  Moderately severe concentric left ventricular hypertrophy  10/21/2017 lipid profile:  Cholesterol 144, triglycerides 115, HDL 39, LDL calculated 82  Interval History:  Patient returns  His shortness of breath has improved  He denies chest discomfort lightheadedness or leg edema  He had an echocardiogram which demonstrated well-preserved systolic function with grade 3 diastolic dysfunction  The diastolic dysfunction may be the etiology of his shortness of breath      Patient Active Problem List   Diagnosis    CAD (coronary artery disease)    Diverticulosis    History of erectile dysfunction    History of tobacco use    Hypertension    Hyperlipidemia    Old myocardial infarction    S/P CABG x 3    Thrombocytopenia (HCC)     Past Medical History:   Diagnosis Date    CAD (coronary artery disease)     Diverticulosis     Hyperglycemia     Hyperlipidemia     Hypertension     Obesity      Social History     Social History    Marital status: /Civil Union     Spouse name: N/A    Number of children: N/A    Years of education: N/A     Occupational History    Not on file  Social History Main Topics    Smoking status: Former Smoker     Types: Cigarettes     Quit date: 1/1/2012    Smokeless tobacco: Never Used    Alcohol use Yes    Drug use: No    Sexual activity: Yes     Partners: Female     Other Topics Concern    Not on file     Social History Narrative    No narrative on file      Family History   Problem Relation Age of Onset    Hypertension Mother      Past Surgical History:   Procedure Laterality Date    CARDIOVASCULAR STRESS TEST  03/20/2013    COLONOSCOPY      onset: 9/21/9    COLONOSCOPY W/ POLYPECTOMY      2/6/17:colon,polyp      CORONARY ARTERY BYPASS GRAFT  01/21/2016    x3       Current Outpatient Prescriptions:     aspirin 81 mg chewable tablet, Chew 1 tablet every 24 hours, Disp: , Rfl:     atorvastatin (LIPITOR) 80 mg tablet, Take 1 tablet by mouth every 24 hours, Disp: , Rfl:     clopidogrel (PLAVIX) 75 mg tablet, Take 1 tablet by mouth every 24 hours, Disp: , Rfl:     metoprolol succinate (TOPROL-XL) 100 mg 24 hr tablet, Take 1 tablet by mouth every 24 hours, Disp: , Rfl:     olmesartan-hydrochlorothiazide (BENICAR HCT) 40-12 5 MG per tablet, , Disp: , Rfl:     tamsulosin (FLOMAX) 0 4 mg, Take 1 capsule by mouth every 24 hours, Disp: , Rfl:   Allergies   Allergen Reactions    No Active Allergies        No results found for this visit on 01/24/19        Lab Results   Component Value Date    CHOL 122 04/27/2018     Lab Results   Component Value Date    HDL 32 (L) 11/12/2018    HDL 29 (L) 04/27/2018 Lab Results   Component Value Date    LDLCALC 81 11/12/2018    LDLCALC 65 04/27/2018     Lab Results   Component Value Date    TRIG 85 11/12/2018    TRIG 142 04/27/2018     No results found for: CHOLHDL  Lab Results   Component Value Date    GLUCOSE 90 04/27/2018    CALCIUM 8 9 11/12/2018     04/27/2018    K 4 4 11/12/2018    CO2 28 11/12/2018     11/12/2018    BUN 19 11/12/2018    CREATININE 1 18 11/12/2018     Lab Results   Component Value Date     04/27/2018    K 4 4 11/12/2018     11/12/2018    CO2 28 11/12/2018    ANIONGAP 11 04/27/2018    BUN 19 11/12/2018    CREATININE 1 18 11/12/2018    GLUCOSE 90 04/27/2018    GLUF 109 (H) 11/12/2018    CALCIUM 8 9 11/12/2018    AST 26 11/12/2018    ALT 41 11/12/2018    ALKPHOS 114 11/12/2018    PROT 6 6 04/27/2018    BILITOT 0 8 04/27/2018    EGFR 61 11/12/2018     Lab Results   Component Value Date    WBC 6 1 04/27/2018    HGB 14 7 04/27/2018    HCT 45 1 04/27/2018    MCV 92 04/27/2018     04/27/2018     Lab Results   Component Value Date    RLN5DQWUUGPO 1 320 04/27/2018       Echocardiogram:  1  Sinus rhythm with premature ventricular contractions  2  Good technical quality  3  Normal left ventricular systolic function, EF 27%  4  Grade 3 diastolic left ventricular dysfunction  5  Moderate concentric left ventricular hypertrophy  6  Mild biatrial enlargement  7  Aortic sclerosis without stenosis  Review of Systems:  Review of Systems   Constitutional: Negative for activity change  Respiratory: Negative for cough, chest tightness, shortness of breath and wheezing  Cardiovascular: Negative for chest pain, palpitations and leg swelling  Musculoskeletal: Negative for gait problem  Skin: Negative for color change  Neurological: Negative for dizziness, tremors, syncope, weakness, light-headedness and headaches  Psychiatric/Behavioral: Negative for agitation and confusion         Physical Exam:  /70 (BP Location: Left arm, Patient Position: Sitting, Cuff Size: Adult)   Pulse 63   Ht 5' 3" (1 6 m)   Wt 88 2 kg (194 lb 6 4 oz)   SpO2 94%   BMI 34 44 kg/m²    Physical Exam   Constitutional: He is oriented to person, place, and time  He appears well-developed and well-nourished  No distress  HENT:   Head: Normocephalic and atraumatic  Neck: No JVD present  Cardiovascular: Normal rate, regular rhythm, normal heart sounds and intact distal pulses  Exam reveals no gallop and no friction rub  No murmur heard  Pulmonary/Chest: Effort normal and breath sounds normal  No respiratory distress  He has no wheezes  He has no rales  He exhibits no tenderness  Abdominal: Soft  Bowel sounds are normal  He exhibits no distension  Musculoskeletal: He exhibits no edema  Neurological: He is alert and oriented to person, place, and time  Skin: Skin is warm and dry  Psychiatric: He has a normal mood and affect  His behavior is normal        Discussion/Summary:  1  Begin Zetia 10 mg daily in addition to atorvastatin  2  Return in 6 months

## 2019-04-11 ENCOUNTER — OFFICE VISIT (OUTPATIENT)
Dept: FAMILY MEDICINE CLINIC | Facility: CLINIC | Age: 74
End: 2019-04-11
Payer: COMMERCIAL

## 2019-04-11 VITALS
OXYGEN SATURATION: 97 % | BODY MASS INDEX: 34.38 KG/M2 | HEIGHT: 63 IN | TEMPERATURE: 98 F | DIASTOLIC BLOOD PRESSURE: 60 MMHG | RESPIRATION RATE: 16 BRPM | HEART RATE: 60 BPM | SYSTOLIC BLOOD PRESSURE: 110 MMHG | WEIGHT: 194 LBS

## 2019-04-11 DIAGNOSIS — R00.1 SINUS BRADYCARDIA: ICD-10-CM

## 2019-04-11 DIAGNOSIS — I10 ESSENTIAL HYPERTENSION: ICD-10-CM

## 2019-04-11 DIAGNOSIS — Z95.1 S/P CABG X 3: Primary | ICD-10-CM

## 2019-04-11 PROCEDURE — 99214 OFFICE O/P EST MOD 30 MIN: CPT | Performed by: FAMILY MEDICINE

## 2019-04-11 PROCEDURE — 93000 ELECTROCARDIOGRAM COMPLETE: CPT | Performed by: FAMILY MEDICINE

## 2019-05-22 DIAGNOSIS — N40.0 BENIGN PROSTATIC HYPERPLASIA WITHOUT LOWER URINARY TRACT SYMPTOMS: Primary | ICD-10-CM

## 2019-05-24 RX ORDER — TAMSULOSIN HYDROCHLORIDE 0.4 MG/1
0.4 CAPSULE ORAL EVERY 24 HOURS
Qty: 90 CAPSULE | Refills: 1 | Status: SHIPPED | OUTPATIENT
Start: 2019-05-24 | End: 2019-07-11 | Stop reason: SDUPTHER

## 2019-07-01 DIAGNOSIS — I25.10 CORONARY ARTERY DISEASE INVOLVING NATIVE CORONARY ARTERY OF NATIVE HEART WITHOUT ANGINA PECTORIS: Primary | ICD-10-CM

## 2019-07-01 DIAGNOSIS — I10 ESSENTIAL HYPERTENSION: ICD-10-CM

## 2019-07-01 DIAGNOSIS — E78.00 PURE HYPERCHOLESTEROLEMIA: ICD-10-CM

## 2019-07-01 RX ORDER — OLMESARTAN MEDOXOMIL AND HYDROCHLOROTHIAZIDE 40/12.5 40; 12.5 MG/1; MG/1
1 TABLET ORAL DAILY
Qty: 90 TABLET | Refills: 3 | Status: SHIPPED | OUTPATIENT
Start: 2019-07-01 | End: 2019-07-11 | Stop reason: SDUPTHER

## 2019-07-01 RX ORDER — ATORVASTATIN CALCIUM 80 MG/1
80 TABLET, FILM COATED ORAL EVERY 24 HOURS
Qty: 90 TABLET | Refills: 3 | Status: SHIPPED | OUTPATIENT
Start: 2019-07-01 | End: 2019-07-11 | Stop reason: SDUPTHER

## 2019-07-01 RX ORDER — CLOPIDOGREL BISULFATE 75 MG/1
75 TABLET ORAL EVERY 24 HOURS
Qty: 90 TABLET | Refills: 3 | Status: SHIPPED | OUTPATIENT
Start: 2019-07-01 | End: 2019-07-11 | Stop reason: SDUPTHER

## 2019-07-11 ENCOUNTER — OFFICE VISIT (OUTPATIENT)
Dept: FAMILY MEDICINE CLINIC | Facility: CLINIC | Age: 74
End: 2019-07-11
Payer: COMMERCIAL

## 2019-07-11 VITALS
DIASTOLIC BLOOD PRESSURE: 60 MMHG | BODY MASS INDEX: 34.73 KG/M2 | HEIGHT: 63 IN | OXYGEN SATURATION: 97 % | RESPIRATION RATE: 16 BRPM | HEART RATE: 60 BPM | TEMPERATURE: 98 F | WEIGHT: 196 LBS | SYSTOLIC BLOOD PRESSURE: 126 MMHG

## 2019-07-11 DIAGNOSIS — Z00.01 ENCOUNTER FOR GENERAL ADULT MEDICAL EXAMINATION WITH ABNORMAL FINDINGS: Primary | ICD-10-CM

## 2019-07-11 DIAGNOSIS — R73.9 HYPERGLYCEMIA: ICD-10-CM

## 2019-07-11 DIAGNOSIS — I25.10 CORONARY ARTERY DISEASE INVOLVING NATIVE CORONARY ARTERY OF NATIVE HEART WITHOUT ANGINA PECTORIS: ICD-10-CM

## 2019-07-11 DIAGNOSIS — Z13.6 SCREENING FOR AAA (ABDOMINAL AORTIC ANEURYSM): ICD-10-CM

## 2019-07-11 DIAGNOSIS — Z12.5 SCREENING FOR PROSTATE CANCER: ICD-10-CM

## 2019-07-11 DIAGNOSIS — N40.0 BENIGN PROSTATIC HYPERPLASIA WITHOUT LOWER URINARY TRACT SYMPTOMS: ICD-10-CM

## 2019-07-11 DIAGNOSIS — I10 ESSENTIAL HYPERTENSION: ICD-10-CM

## 2019-07-11 DIAGNOSIS — E78.00 PURE HYPERCHOLESTEROLEMIA: ICD-10-CM

## 2019-07-11 PROCEDURE — G0439 PPPS, SUBSEQ VISIT: HCPCS | Performed by: FAMILY MEDICINE

## 2019-07-11 PROCEDURE — 1170F FXNL STATUS ASSESSED: CPT | Performed by: FAMILY MEDICINE

## 2019-07-11 PROCEDURE — 99214 OFFICE O/P EST MOD 30 MIN: CPT | Performed by: FAMILY MEDICINE

## 2019-07-11 PROCEDURE — 1125F AMNT PAIN NOTED PAIN PRSNT: CPT | Performed by: FAMILY MEDICINE

## 2019-07-11 RX ORDER — TAMSULOSIN HYDROCHLORIDE 0.4 MG/1
0.4 CAPSULE ORAL EVERY 24 HOURS
Qty: 90 CAPSULE | Refills: 3 | Status: SHIPPED | OUTPATIENT
Start: 2019-07-11 | End: 2020-07-13 | Stop reason: SDUPTHER

## 2019-07-11 RX ORDER — ASPIRIN 81 MG/1
81 TABLET, CHEWABLE ORAL EVERY 24 HOURS
Qty: 90 TABLET | Refills: 3 | Status: SHIPPED | OUTPATIENT
Start: 2019-07-11 | End: 2020-07-13 | Stop reason: SDUPTHER

## 2019-07-11 RX ORDER — CLOPIDOGREL BISULFATE 75 MG/1
75 TABLET ORAL EVERY 24 HOURS
Qty: 90 TABLET | Refills: 3 | Status: SHIPPED | OUTPATIENT
Start: 2019-07-11 | End: 2020-07-13 | Stop reason: SDUPTHER

## 2019-07-11 RX ORDER — ATORVASTATIN CALCIUM 80 MG/1
80 TABLET, FILM COATED ORAL EVERY 24 HOURS
Qty: 90 TABLET | Refills: 3 | Status: SHIPPED | OUTPATIENT
Start: 2019-07-11 | End: 2020-07-13 | Stop reason: SDUPTHER

## 2019-07-11 RX ORDER — OLMESARTAN MEDOXOMIL AND HYDROCHLOROTHIAZIDE 40/12.5 40; 12.5 MG/1; MG/1
1 TABLET ORAL DAILY
Qty: 90 TABLET | Refills: 3 | Status: SHIPPED | OUTPATIENT
Start: 2019-07-11 | End: 2020-07-13 | Stop reason: SDUPTHER

## 2019-07-11 RX ORDER — EZETIMIBE 10 MG/1
10 TABLET ORAL DAILY
Qty: 90 TABLET | Refills: 3 | Status: SHIPPED | OUTPATIENT
Start: 2019-07-11 | End: 2020-07-13 | Stop reason: SDUPTHER

## 2019-07-11 RX ORDER — METOPROLOL SUCCINATE 100 MG/1
100 TABLET, EXTENDED RELEASE ORAL EVERY 24 HOURS
Qty: 90 TABLET | Refills: 3 | Status: SHIPPED | OUTPATIENT
Start: 2019-07-11 | End: 2020-07-13 | Stop reason: SDUPTHER

## 2019-07-11 NOTE — PROGRESS NOTES
Assessment and Plan:   1  Encounter for general adult medical examination with abnormal findings  During this visit we have a goal to personalize prevention  I discussed the patient about - Coronary Artery Disease-  - Hypertension-  -Hyperlipidemia  -Obesity, the need for a life style plan and decrease the impact of current problems  Health risk assessment was discussed with patient also and the ways to stay healthier  We reviewed also the current medications, the need to avoid polypharmacy in his current treatment; also about how the chronic conditions are impacting now and later  Recommended a healthy diet and exercising frequently will help to control better patient's current chronic conditions;  Immunizations, and the need to compliance with current CDC's recommendations  Patient declined at this time advanced directives  I encouraged against the use alcohol, tobacco, recreational illegal prescribed and non-prescribed drugs, Smoking status Not applicable and the use of cell phone while driving        Problem List Items Addressed This Visit     None         History of Present Illness:     Patient presents for Medicare Annual Wellness visit    Patient Care Team:  Celine Barroso MD as PCP - General (Family Medicine)     Problem List:     Patient Active Problem List   Diagnosis    CAD (coronary artery disease)    Diverticulosis    History of erectile dysfunction    History of tobacco use    Hypertension    Hyperlipidemia    Old myocardial infarction    S/P CABG x 3      Past Medical and Surgical History:     Past Medical History:   Diagnosis Date    CAD (coronary artery disease)     Diverticulosis     Hyperglycemia     Hyperlipidemia     Hypertension     Obesity      Past Surgical History:   Procedure Laterality Date    CARDIOVASCULAR STRESS TEST  03/20/2013    COLONOSCOPY      onset: 9/21/9    COLONOSCOPY W/ POLYPECTOMY      2/6/17:colon,polyp      CORONARY ARTERY BYPASS GRAFT  01/21/2016 x3      Family History:     Family History   Problem Relation Age of Onset    Hypertension Mother       Social History:     Social History     Tobacco Use   Smoking Status Former Smoker    Types: Cigarettes    Last attempt to quit: 2012    Years since quittin 5   Smokeless Tobacco Never Used     Social History     Substance and Sexual Activity   Alcohol Use Yes    Frequency: 2-4 times a month    Drinks per session: 1 or 2    Binge frequency: Never     Social History     Substance and Sexual Activity   Drug Use No      Medications and Allergies:     Current Outpatient Medications   Medication Sig Dispense Refill    aspirin 81 mg chewable tablet Chew 1 tablet every 24 hours      atorvastatin (LIPITOR) 80 mg tablet Take 1 tablet (80 mg total) by mouth every 24 hours 90 tablet 3    clopidogrel (PLAVIX) 75 mg tablet Take 1 tablet (75 mg total) by mouth every 24 hours 90 tablet 3    ezetimibe (ZETIA) 10 mg tablet Take 1 tablet (10 mg total) by mouth daily 30 tablet 5    metoprolol succinate (TOPROL-XL) 100 mg 24 hr tablet Take 1 tablet by mouth every 24 hours      olmesartan-hydrochlorothiazide (BENICAR HCT) 40-12 5 MG per tablet Take 1 tablet by mouth daily 90 tablet 3    tamsulosin (FLOMAX) 0 4 mg Take 1 capsule (0 4 mg total) by mouth every 24 hours 90 capsule 1     No current facility-administered medications for this visit        Allergies   Allergen Reactions    No Active Allergies       Immunizations:     Immunization History   Administered Date(s) Administered    H1N1, All Formulations 10/22/2009    INFLUENZA 10/14/2015, 10/14/2015, 2018    Influenza Split 10/30/2013    Influenza TIV (IM) 2011, 2012, 10/23/2014    Influenza, high dose seasonal 0 5 mL 2018    Pneumococcal Polysaccharide PPV23 2012, 2016    Tdap 2016    Zoster 2012      Medicare Screening Tests and Risk Assessments:     Last Medicare Wellness visit information reviewed, patient interviewed, no change since last AWV  Last Medicare Wellness visit information reviewed, patient interviewed and updates made to the record today  Health Risk Assessment:  Patient rates overall health as fair  Patient feels that their physical health rating is Same  Eyesight was rated as Same  Hearing was rated as Slightly worse  Patient feels that their emotional and mental health rating is Same  Pain experienced by patient in the last 7 days has been None  Patient states that he has experienced no weight loss or gain in last 6 months  Emotional/Mental Health:  Patient has not been feeling nervous/anxious  PHQ-9 Depression Screening:    Frequency of the following problems over the past two weeks:      1  Little interest or pleasure in doing things: 1 - several days      2  Feeling down, depressed, or hopeless: 1 - several days  PHQ-2 Score: 2          Broken Bones/Falls: Fall Risk Assessment:    In the past year, patient has experienced: No history of falling in past year          Bladder/Bowel:  Patient has not leaked urine accidently in the last six months  Patient reports no loss of bowel control  Immunizations:  Patient has had a flu vaccination within the last year  Patient has received a pneumonia shot  Patient has received a shingles shot  Patient has received tetanus/diphtheria shot  Home Safety:  Patient does not have trouble with stairs inside or outside of their home  Patient currently reports that there are working smoke alarms, working carbon monoxide detectors  Preventative Screenings:   prostate cancer screen performed, colon cancer screen completed, cholesterol screen completed, glaucoma eye exam completed,     Nutrition:  Current diet: Regular with servings of the following:    Medications:  Patient is not currently taking any over-the-counter supplements  Patient is able to manage medications  Lifestyle Choices:  Patient reports no tobacco use    Patient has smoked or used tobacco in the past   Patient reports alcohol use  Patient drives a vehicle  Patient wears seat belt  Current level of exercise of physical activity described by patient as: moderate  Activities of Daily Living:  Can get out of bed by his or her self, able to dress self, able to make own meals, able to do own shopping, able to bathe self, can do own laundry/housekeeping, can manage own money, pay bills and track expenses    Previous Hospitalizations:  No hospitalization or ED visit in past 12 months        Advanced Directives:  Patient has not decided on power of   Patient has not completed advanced directive  Preventative Screening/Counseling:      Cardiovascular:      General: Screening Current      Counseling: Healthy Diet and Healthy Weight          Diabetes:      General: Risks and Benefits Discussed and Screening Current      Counseling: Healthy Diet, Healthy Weight and Improve Physical Activity          Colorectal Cancer:      General: Risks and Benefits Discussed and Screening Current      Counseling: high fiber diet          Prostate Cancer:      Due for labs: PSA          Osteoporosis:      General: Screening Not Indicated          AAA:  Male patient with age over [de-identified] years  Patient has history of tobacco use  General: Risks and Benefits Discussed      Counseling: tobacco cessation counseling given      Study: Screening US          Glaucoma:      General: Screening Current          HIV:      General: Screening Not Indicated          Hepatitis C:      General: Screening Current        Advanced Directives:   Patient has no living will for healthcare, does not have durable POA for healthcare, patient does not have an advanced directive  Information on ACP and/or AD provided  5 wishes given  End of life assessment reviewed with patient  Provider agrees with end of life decisions

## 2019-07-11 NOTE — PROGRESS NOTES
Assessment/Plan:    Hypertension  Blood pressure is well control, patient will continue same treatment  Patient understands the risks associated with HTN and the need for adequate control and adherence to therapy  Explained to patient that therapeutic measure is lifelong lifestyle modification including:  Sodium reduction (<2 g/day)  Dietary Approaches to Stop Hypertension (DASH) diet (3 servings of fruit and vegetables daily, whole grains, low sodium, low-fat proteins)   Weight loss to BMI under 30 kg/m^2  Physical activity: 3 to 5 times/week of daily aerobic exercise for 30- to 50-minute sessions as tolerated   Avoid alcohol consumption  Hyperlipidemia  I reviewed previous laboratory data and consulting notes, we are going to repeat labs to ensure stability    CAD (coronary artery disease)  Condition is stable with current treatment, to  continue the same  I encouraged patient to exercise 1 hour 3 times per week       Diagnoses and all orders for this visit:    Encounter for general adult medical examination with abnormal findings  -     CBC and differential; Future  -     Comprehensive metabolic panel; Future  -     Lipid panel; Future    Hyperglycemia  -     HEMOGLOBIN A1C W/ EAG ESTIMATION; Future    Pure hypercholesterolemia  -     ezetimibe (ZETIA) 10 mg tablet; Take 1 tablet (10 mg total) by mouth daily    Coronary artery disease involving native coronary artery of native heart without angina pectoris  -     clopidogrel (PLAVIX) 75 mg tablet; Take 1 tablet (75 mg total) by mouth every 24 hours  -     aspirin 81 mg chewable tablet; Chew 1 tablet (81 mg total) every 24 hours    Essential hypertension  -     atorvastatin (LIPITOR) 80 mg tablet; Take 1 tablet (80 mg total) by mouth every 24 hours  -     metoprolol succinate (TOPROL-XL) 100 mg 24 hr tablet; Take 1 tablet (100 mg total) by mouth every 24 hours  -     olmesartan-hydrochlorothiazide (BENICAR HCT) 40-12 5 MG per tablet;  Take 1 tablet by mouth daily    Benign prostatic hyperplasia without lower urinary tract symptoms  -     tamsulosin (FLOMAX) 0 4 mg; Take 1 capsule (0 4 mg total) by mouth every 24 hours    Screening for prostate cancer  -     PSA Total (Reflex To Free); Future    Screening for AAA (abdominal aortic aneurysm)  -     US abdominal aorta screening aaa; Future          Subjective:      Patient ID: Vandana Hull is a 69 y o  male  Patient is here to follow HTN, patient states good compliance with treatment  Denies chest pain, shortness of breath, urinary problems  No exercise but follows low salt diet  Current medication includes:  Benicar with hydrochlorothiazide, metoprolol  The following portions of the patient's history were reviewed and updated as appropriate: allergies, current medications, past family history, past medical history, past social history, past surgical history and problem list     Review of Systems   Constitutional: Negative for appetite change, fatigue and fever  Eyes: Negative for visual disturbance  Respiratory: Negative for cough, shortness of breath and wheezing  Cardiovascular: Negative for chest pain, palpitations and leg swelling  Gastrointestinal: Negative for abdominal pain, blood in stool and diarrhea  Genitourinary: Negative for decreased urine volume, difficulty urinating and genital sores  Musculoskeletal: Negative for arthralgias, back pain and myalgias  Skin: Negative for color change, pallor and rash  Neurological: Negative for seizures, numbness and headaches  Psychiatric/Behavioral: Negative for self-injury, sleep disturbance and suicidal ideas  The patient is not nervous/anxious  Objective:      /60 (BP Location: Left arm, Patient Position: Sitting, Cuff Size: Standard)   Pulse 60   Temp 98 °F (36 7 °C) (Oral)   Resp 16   Ht 5' 3" (1 6 m)   Wt 88 9 kg (196 lb)   SpO2 97%   BMI 34 72 kg/m²          Physical Exam   Constitutional: No distress     HENT: Nose: Nose normal    Mouth/Throat: Oropharynx is clear and moist    Eyes: Pupils are equal, round, and reactive to light  Conjunctivae are normal    Neck: Normal range of motion  No thyromegaly present  Cardiovascular: Normal rate, regular rhythm and normal heart sounds  Exam reveals no friction rub  No murmur heard  Pulmonary/Chest: Effort normal and breath sounds normal  No stridor  No respiratory distress  Musculoskeletal: He exhibits no edema, tenderness or deformity  Neurological: He displays normal reflexes  No cranial nerve deficit  He exhibits normal muscle tone  Coordination normal    Skin: He is not diaphoretic

## 2019-07-11 NOTE — ASSESSMENT & PLAN NOTE
Blood pressure is well control, patient will continue same treatment  Patient understands the risks associated with HTN and the need for adequate control and adherence to therapy  Explained to patient that therapeutic measure is lifelong lifestyle modification including:  Sodium reduction (<2 g/day)  Dietary Approaches to Stop Hypertension (DASH) diet (3 servings of fruit and vegetables daily, whole grains, low sodium, low-fat proteins)   Weight loss to BMI under 30 kg/m^2  Physical activity: 3 to 5 times/week of daily aerobic exercise for 30- to 50-minute sessions as tolerated   Avoid alcohol consumption

## 2019-07-11 NOTE — PATIENT INSTRUCTIONS
Obesity   AMBULATORY CARE:   Obesity  is when your body mass index (BMI) is greater than 30  Your healthcare provider will use your height and weight to measure your BMI  The risks of obesity include  many health problems, such as injuries or physical disability  You may need tests to check for the following:  · Diabetes     · High blood pressure or high cholesterol     · Heart disease     · Gallbladder or liver disease     · Cancer of the colon, breast, prostate, liver, or kidney     · Sleep apnea     · Arthritis or gout  Seek care immediately if:   · You have a severe headache, confusion, or difficulty speaking  · You have weakness on one side of your body  · You have chest pain, sweating, or shortness of breath  Contact your healthcare provider if:   · You have symptoms of gallbladder or liver disease, such as pain in your upper abdomen  · You have knee or hip pain and discomfort while walking  · You have symptoms of diabetes, such as intense hunger and thirst, and frequent urination  · You have symptoms of sleep apnea, such as snoring or daytime sleepiness  · You have questions or concerns about your condition or care  Treatment for obesity  focuses on helping you lose weight to improve your health  Even a small decrease in BMI can reduce the risk for many health problems  Your healthcare provider will help you set a weight-loss goal   · Lifestyle changes  are the first step in treating obesity  These include making healthy food choices and getting regular physical activity  Your healthcare provider may suggest a weight-loss program that involves coaching, education, and therapy  · Medicine  may help you lose weight when it is used with a healthy diet and physical activity  · Surgery  can help you lose weight if you are very obese and have other health problems  There are several types of weight-loss surgery  Ask your healthcare provider for more information    Be successful losing weight:   · Set small, realistic goals  An example of a small goal is to walk for 20 minutes 5 days a week  Anther goal is to lose 5% of your body weight  · Tell friends, family members, and coworkers about your goals  and ask for their support  Ask a friend to lose weight with you, or join a weight-loss support group  · Identify foods or triggers that may cause you to overeat , and find ways to avoid them  Remove tempting high-calorie foods from your home and workplace  Place a bowl of fresh fruit on your kitchen counter  If stress causes you to eat, then find other ways to cope with stress  · Keep a diary to track what you eat and drink  Also write down how many minutes of physical activity you do each day  Weigh yourself once a week and record it in your diary  Eating changes: You will need to eat 500 to 1,000 fewer calories each day than you currently eat to lose 1 to 2 pounds a week  The following changes will help you cut calories:  · Eat smaller portions  Use small plates, no larger than 9 inches in diameter  Fill your plate half full of fruits and vegetables  Measure your food using measuring cups until you know what a serving size looks like  · Eat 3 meals and 1 or 2 snacks each day  Plan your meals in advance  Devika Keep and eat at home most of the time  Eat slowly  · Eat fruits and vegetables at every meal   They are low in calories and high in fiber, which makes you feel full  Do not add butter, margarine, or cream sauce to vegetables  Use herbs to season steamed vegetables  · Eat less fat and fewer fried foods  Eat more baked or grilled chicken and fish  These protein sources are lower in calories and fat than red meat  Limit fast food  Dress your salads with olive oil and vinegar instead of bottled dressing  · Limit the amount of sugar you eat  Do not drink sugary beverages  Limit alcohol  Activity changes:  Physical activity is good for your body in many ways   It helps you burn calories and build strong muscles  It decreases stress and depression, and improves your mood  It can also help you sleep better  Talk to your healthcare provider before you begin an exercise program   · Exercise for at least 30 minutes 5 days a week  Start slowly  Set aside time each day for physical activity that you enjoy and that is convenient for you  It is best to do both weight training and an activity that increases your heart rate, such as walking, bicycling, or swimming  · Find ways to be more active  Do yard work and housecleaning  Walk up the stairs instead of using elevators  Spend your leisure time going to events that require walking, such as outdoor festivals or fairs  This extra physical activity can help you lose weight and keep it off  Follow up with your healthcare provider as directed: You may need to meet with a dietitian  Write down your questions so you remember to ask them during your visits  © 2017 [address] [employer] Information is for End User's use only and may not be sold, redistributed or otherwise used for commercial purposes  All illustrations and images included in CareNotes® are the copyrighted property of [employer]  or Gonzalo Mcfadden  The above information is an  only  It is not intended as medical advice for individual conditions or treatments  Talk to your doctor, nurse or pharmacist before following any medical regimen to see if it is safe and effective for you  Urinary Incontinence   WHAT YOU NEED TO KNOW:   What is urinary incontinence? Urinary incontinence (UI) is when you lose control of your bladder  What causes UI? UI occurs because your bladder cannot store or empty urine properly  The following are the most common types of UI:  · Stress incontinence  is when you leak urine due to increased bladder pressure  This may happen when you cough, sneeze, or exercise       · Urge incontinence  is when you feel the need to urinate right away and leak urine accidentally  · Mixed incontinence  is when you have both stress and urge UI  What are the signs and symptoms of UI?   · You feel like your bladder does not empty completely when you urinate  · You urinate often and need to urinate immediately  · You leak urine when you sleep, or you wake up with the urge to urinate  · You leak urine when you cough, sneeze, exercise, or laugh  How is UI diagnosed? Your healthcare provider will ask how often you leak urine and whether you have stress or urge symptoms  Tell him which medicines you take, how often you urinate, and how much liquid you drink each day  You may need any of the following tests:  · Urine tests  may show infection or kidney function  · A pelvic exam  may be done to check for blockages  A pelvic exam will also show if your bladder, uterus, or other organs have moved out of place  · An x-ray, ultrasound, or CT  may show problems with parts of your urinary system  You may be given contrast liquid to help your organs show up better in the pictures  Tell the healthcare provider if you have ever had an allergic reaction to contrast liquid  Do not enter the MRI room with anything metal  Metal can cause serious injury  Tell the healthcare provider if you have any metal in or on your body  · A bladder scan  will show how much urine is left in your bladder after you urinate  You will be asked to urinate and then healthcare providers will use a small ultrasound machine to check the urine left in your bladder  · Cystometry  is used to check the function of your urinary system  Your healthcare provider checks the pressure in your bladder while filling it with fluid  Your bladder pressure may also be tested when your bladder is full and while you urinate  How is UI treated? · Medicines  can help strengthen your bladder control      · Electrical stimulation  is used to send a small amount of electrical energy to your pelvic floor muscles  This helps control your bladder function  Electrodes may be placed outside your body or in your rectum  For women, the electrodes may be placed in the vagina  · A bulking agent  may be injected into the wall of your urethra to make it thicker  This helps keep your urethra closed and decreases urine leakage  · Devices  such as a clamp, pessary, or tampon may help stop urine leaks  Ask your healthcare provider for more information about these and other devices  · Surgery  may be needed if other treatments do not work  Several types of surgery can help improve your bladder control  Ask your healthcare provider for more information about the surgery you may need  How can I manage my symptoms? · Do pelvic muscle exercises often  Your pelvic muscles help you stop urinating  Squeeze these muscles tight for 5 seconds, then relax for 5 seconds  Gradually work up to squeezing for 10 seconds  Do 3 sets of 15 repetitions a day, or as directed  This will help strengthen your pelvic muscles and improve bladder control  · A catheter  may be used to help empty your bladder  A catheter is a tiny, plastic tube that is put into your bladder to drain your urine  Your healthcare provider may tell you to use a catheter to prevent your bladder from getting too full and leaking urine  · Keep a UI record  Write down how often you leak urine and how much you leak  Make a note of what you were doing when you leaked urine  · Train your bladder  Go to the bathroom at set times, such as every 2 hours, even if you do not feel the urge to go  You can also try to hold your urine when you feel the urge to go  For example, hold your urine for 5 minutes when you feel the urge to go  As that becomes easier, hold your urine for 10 minutes  · Drink liquids as directed  Ask your healthcare provider how much liquid to drink each day and which liquids are best for you   You may need to limit the amount of liquid you drink to help control your urine leakage  Limit or do not have drinks that contain caffeine or alcohol  Do not drink any liquid right before you go to bed  · Prevent constipation  Eat a variety of high-fiber foods  Good examples are high-fiber cereals, beans, vegetables, and whole-grain breads  Prune juice may help make your bowel movement softer  Walking is the best way to trigger your intestines to have a bowel movement  · Exercise regularly and maintain a healthy weight  Ask your healthcare provider how much you should weigh and about the best exercise plan for you  Weight loss and exercise will decrease pressure on your bladder and help you control your leakage  Ask him to help you create a weight loss plan if you are overweight  When should I seek immediate care? · You have severe pain  · You are confused or cannot think clearly  When should I contact my healthcare provider? · You have a fever  · You see blood in your urine  · You have pain when you urinate  · You have new or worse pain, even after treatment  · Your mouth feels dry or you have vision changes  · Your urine is cloudy or smells bad  · You have questions or concerns about your condition or care  CARE AGREEMENT:   You have the right to help plan your care  Learn about your health condition and how it may be treated  Discuss treatment options with your caregivers to decide what care you want to receive  You always have the right to refuse treatment  The above information is an  only  It is not intended as medical advice for individual conditions or treatments  Talk to your doctor, nurse or pharmacist before following any medical regimen to see if it is safe and effective for you  © 2017 [address] [employer] Information is for End User's use only and may not be sold, redistributed or otherwise used for commercial purposes   All illustrations and images included in CareNotes® are the copyrighted property of [employer]  or Gonzalo Mcfadden  Cigarette Smoking and Your Health   AMBULATORY CARE:   Risks to your health if you smoke:  Nicotine and other chemicals found in tobacco damage every cell in your body  Even if you are a light smoker, you have an increased risk for cancer, heart disease, and lung disease  If you are pregnant or have diabetes, smoking increases your risk for complications  Benefits to your health if you stop smoking:   · You decrease respiratory symptoms such as coughing, wheezing, and shortness of breath  · You reduce your risk for cancers of the lung, mouth, throat, kidney, bladder, pancreas, stomach, and cervix  If you already have cancer, you increase the benefits of chemotherapy  You also reduce your risk for cancer returning or a second cancer from developing  · You reduce your risk for heart disease, blood clots, heart attack, and stroke  · You reduce your risk for lung infections, and diseases such as pneumonia, asthma, chronic bronchitis, and emphysema  · Your circulation improves  More oxygen can be delivered to your body  If you have diabetes, you lower your risk for complications, such as kidney, artery, and eye diseases  You also lower your risk for nerve damage  Nerve damage can lead to amputations, poor vision, and blindness  · You improve your body's ability to heal and to fight infections  Benefits to the health of others if you stop smoking:  Tobacco is harmful to nonsmokers who breathe in your secondhand smoke  The following are ways the health of others around you may improve when you stop smoking:  · You lower the risks for lung cancer and heart disease in nonsmoking adults  · If you are pregnant, you lower the risk for miscarriage, early delivery, low birth weight, and stillbirth  You also lower your baby's risk for SIDS, obesity, developmental delay, and neurobehavioral problems, such as ADHD  · If you have children, you lower their risk for ear infections, colds, pneumonia, bronchitis, and asthma  For more information and support to stop smoking:   · Smokefree  gov  Phone: 8- 953 - 580-3055  Web Address: www smokefrMuecs  Follow up with your healthcare provider as directed:  Write down your questions so you remember to ask them during your visits  © 2017 [address] [employer] Information is for End User's use only and may not be sold, redistributed or otherwise used for commercial purposes  All illustrations and images included in CareNotes® are the copyrighted property of [employer]  or Gonzalo Mcfadden  The above information is an  only  It is not intended as medical advice for individual conditions or treatments  Talk to your doctor, nurse or pharmacist before following any medical regimen to see if it is safe and effective for you  Fall Prevention   WHAT YOU NEED TO KNOW:   What is fall prevention? Fall prevention includes ways to make your home and other areas safer  It also includes ways you can move more carefully to prevent a fall  What increases my risk for falls? · Lack of vitamin D    · Not getting enough sleep each night    · Trouble walking or keeping your balance, or foot problems    · Health conditions that cause changes in your blood pressure, vision, or muscle strength and coordination    · Medicines that make you dizzy, weak, or sleepy    · Problems seeing clearly    · Shoes that have high heels or are not supportive    · Tripping hazards, such as items left on the floor, no handrails on the stairs, or broken steps  How can I help protect myself from falls? · Stand or sit up slowly  This may help you keep your balance and prevent falls  If you need to get up during the night, sit up first  Be sure you are fully awake before you stand  Turn on the light before you start walking  Go slowly in case you are still sleepy   Make sure you will not trip over any pets sleeping in the bedroom  · Use assistive devices as directed  Your healthcare provider may suggest that you use a cane or walker to help you keep your balance  You may need to have grab bars put in your bathroom near the toilet or in the shower  · Wear shoes that fit well and have soles that   Wear shoes both inside and outside  Use slippers with good   Do not wear shoes with high heels  · Wear a personal alarm  This is a device that allows you to call 911 if you fall and need help  Ask your healthcare provider for more information  · Stay active  Exercise can help strengthen your muscles and improve your balance  Your healthcare provider may recommend water aerobics or walking  He or she may also recommend physical therapy to improve your coordination  Never start an exercise program without talking to your healthcare provider first      · Manage medical conditions  Keep all appointments with your healthcare providers  Visit your eye doctor as directed  How can I make my home safer? · Add items to prevent falls in the bathroom  Put nonslip strips on your bath or shower floor to prevent you from slipping  Use a bath mat if you do not have carpet in the bathroom  This will prevent you from falling when you step out of the bath or shower  Use a shower seat so you do not need to stand while you shower  Sit on the toilet or a chair in your bathroom to dry yourself and put on clothing  This will prevent you from losing your balance from drying or dressing yourself while you are standing  · Keep paths clear  Remove books, shoes, and other objects from walkways and stairs  Place cords for telephones and lamps out of the way so that you do not need to walk over them  Tape them down if you cannot move them  Remove small rugs  If you cannot remove a rug, secure it with double-sided tape  This will prevent you from tripping  · Install bright lights in your home  Use night lights to help light paths to the bathroom or kitchen  Always turn on the light before you start walking  · Keep items you use often on shelves within reach  Do not use a step stool to help you reach an item  · Paint or place reflective tape on the edges of your stairs  This will help you see the stairs better  Call 911 or have someone else call if:   · You have fallen and are unconscious  · You have fallen and cannot move part of your body  Contact your healthcare provider if:   · You have fallen and have pain or a headache  · You have questions or concerns about your condition or care  CARE AGREEMENT:   You have the right to help plan your care  Learn about your health condition and how it may be treated  Discuss treatment options with your caregivers to decide what care you want to receive  You always have the right to refuse treatment  The above information is an  only  It is not intended as medical advice for individual conditions or treatments  Talk to your doctor, nurse or pharmacist before following any medical regimen to see if it is safe and effective for you  © 2017 [address] [facility] Information is for End User's use only and may not be sold, redistributed or otherwise used for commercial purposes  All illustrations and images included in CareNotes® are the copyrighted property of [employer]  or Gonzalo Mcfadden  Advance Directives   WHAT YOU NEED TO KNOW:   What are advance directives? Advance directives are legal documents that state your wishes and plans for medical care  These plans are made ahead of time in case you lose your ability to make decisions for yourself  Advance directives can apply to any medical decision, such as the treatments you want, and if you want to donate organs  What are the types of advance directives? There are many types of advance directives, and each state has rules about how to use them   You may choose a combination of any of the following:  · Living will: This is a written record of the treatment you want  You can also choose which treatments you do not want, which to limit, and which to stop at a certain time  This includes surgery, medicine, IV fluid, and tube feedings  · Durable power of  for healthcare [facility]): This is a written record that states who you want to make healthcare choices for you when you are unable to make them for yourself  This person, called a proxy, is usually a family member or a friend  You may choose more than 1 proxy  · Do not resuscitate (DNR) order:  A DNR order is used in case your heart stops beating or you stop breathing  It is a request not to have certain forms of treatment, such as CPR  A DNR order may be included in other types of advance directives  · Medical directive: This covers the care that you want if you are in a coma, near death, or unable to make decisions for yourself  You can list the treatments you want for each condition  Treatment may include pain medicine, surgery, blood transfusions, dialysis, IV or tube feedings, and a ventilator (breathing machine)  · Values history: This document has questions about your views, beliefs, and how you feel and think about life  This information can help others choose the care that you would choose  Why are advance directives important? An advance directive helps you control your care  Although spoken wishes may be used, it is better to have your wishes written down  Spoken wishes can be misunderstood, or not followed  Treatments may be given even if you do not want them  An advance directive may make it easier for your family to make difficult choices about your care  How do I decide what to put in my advance directives? · Make informed decisions:  Make sure you fully understand treatments or care you may receive   Think about the benefits and problems your decisions could cause for you or your family  Talk to healthcare providers if you have concerns or questions before you write down your wishes  You may also want to talk with your [employer] or , or a   Check your state laws to make sure that what you put in your advance directive is legal      · Sign all forms:  Sign and date your advance directive when you have finished  You may also need 2 witnesses to sign the forms  Witnesses cannot be your doctor or his staff, your spouse, heirs or beneficiaries, people you owe money to, or your chosen proxy  Talk to your family, proxy, and healthcare providers about your advance directive  Give each person a copy, and keep one for yourself in a place you can get to easily  Do not keep it hidden or locked away  · Review and revise your plans: You can revise your advance directive at any time, as long as you are able to make decisions  Review your plan every year, and when there are changes in your life, or your health  When you make changes, let your family, proxy, and healthcare providers know  Give each a new copy  Where can I find more information? · [employer]  [address] , [address]   Phone: 5- [phone] - [phone]  Phone: 3- 785 - [phone]  Web Address: http://www  aafp org  · [address] [facility])  [address] [facility] [place], [address]  Phone: [phone]  Phone: [phone]  Web Address: Nora weaver  [facility] AGREEMENT:   You have the right to help plan your care  To help with this plan, you must learn about your health condition and treatment options  You must also learn about advance directives and how they are used  Work with your healthcare providers to decide what care will be used to treat you  You always have the right to refuse treatment  The above information is an  only   It is not intended as medical advice for individual conditions or treatments  Talk to your doctor, nurse or pharmacist before following any medical regimen to see if it is safe and effective for you  © 2017 [address] [employer] Information is for End User's use only and may not be sold, redistributed or otherwise used for commercial purposes  All illustrations and images included in CareNotes® are the copyrighted property of [employer] M [employer]  or Gonzalo Mcfadden

## 2019-07-11 NOTE — ASSESSMENT & PLAN NOTE
Condition is stable with current treatment, to  continue the same  I encouraged patient to exercise 1 hour 3 times per week

## 2019-07-11 NOTE — ASSESSMENT & PLAN NOTE
I reviewed previous laboratory data and consulting notes, we are going to repeat labs to ensure stability

## 2019-07-15 ENCOUNTER — APPOINTMENT (OUTPATIENT)
Dept: LAB | Facility: HOSPITAL | Age: 74
End: 2019-07-15
Payer: COMMERCIAL

## 2019-07-15 ENCOUNTER — HOSPITAL ENCOUNTER (OUTPATIENT)
Dept: ULTRASOUND IMAGING | Facility: HOSPITAL | Age: 74
Discharge: HOME | End: 2019-07-15
Payer: COMMERCIAL

## 2019-07-15 DIAGNOSIS — R73.9 HYPERGLYCEMIA: ICD-10-CM

## 2019-07-15 DIAGNOSIS — Z13.6 SCREENING FOR AAA (ABDOMINAL AORTIC ANEURYSM): ICD-10-CM

## 2019-07-15 DIAGNOSIS — Z12.5 SCREENING FOR PROSTATE CANCER: ICD-10-CM

## 2019-07-15 DIAGNOSIS — Z00.01 ENCOUNTER FOR GENERAL ADULT MEDICAL EXAMINATION WITH ABNORMAL FINDINGS: ICD-10-CM

## 2019-07-15 LAB
ALBUMIN SERPL BCP-MCNC: 3.9 G/DL (ref 3–5.2)
ALP SERPL-CCNC: 95 U/L (ref 43–122)
ALT SERPL W P-5'-P-CCNC: 47 U/L (ref 9–52)
ANION GAP SERPL CALCULATED.3IONS-SCNC: 9 MMOL/L (ref 5–14)
AST SERPL W P-5'-P-CCNC: 27 U/L (ref 17–59)
BASOPHILS # BLD AUTO: 0 THOUSANDS/ΜL (ref 0–0.1)
BASOPHILS NFR BLD AUTO: 1 % (ref 0–1)
BILIRUB SERPL-MCNC: 0.6 MG/DL
BUN SERPL-MCNC: 21 MG/DL (ref 5–25)
CALCIUM SERPL-MCNC: 9.1 MG/DL (ref 8.4–10.2)
CHLORIDE SERPL-SCNC: 110 MMOL/L (ref 97–108)
CHOLEST SERPL-MCNC: 105 MG/DL
CO2 SERPL-SCNC: 26 MMOL/L (ref 22–30)
CREAT SERPL-MCNC: 1.38 MG/DL (ref 0.7–1.5)
EOSINOPHIL # BLD AUTO: 0.1 THOUSAND/ΜL (ref 0–0.4)
EOSINOPHIL NFR BLD AUTO: 2 % (ref 0–6)
ERYTHROCYTE [DISTWIDTH] IN BLOOD BY AUTOMATED COUNT: 13.8 %
EST. AVERAGE GLUCOSE BLD GHB EST-MCNC: 137 MG/DL
GFR SERPL CREATININE-BSD FRML MDRD: 50 ML/MIN/1.73SQ M
GLUCOSE P FAST SERPL-MCNC: 105 MG/DL (ref 70–99)
HBA1C MFR BLD: 6.4 % (ref 4.2–6.3)
HCT VFR BLD AUTO: 43.7 % (ref 41–53)
HDLC SERPL-MCNC: 34 MG/DL (ref 40–59)
HGB BLD-MCNC: 14.5 G/DL (ref 13.5–17.5)
LDLC SERPL CALC-MCNC: 59 MG/DL
LYMPHOCYTES # BLD AUTO: 1 THOUSANDS/ΜL (ref 0.5–4)
LYMPHOCYTES NFR BLD AUTO: 15 % (ref 25–45)
MCH RBC QN AUTO: 31.4 PG (ref 26–34)
MCHC RBC AUTO-ENTMCNC: 33.3 G/DL (ref 31–36)
MCV RBC AUTO: 94 FL (ref 80–100)
MONOCYTES # BLD AUTO: 0.6 THOUSAND/ΜL (ref 0.2–0.9)
MONOCYTES NFR BLD AUTO: 9 % (ref 1–10)
NEUTROPHILS # BLD AUTO: 4.9 THOUSANDS/ΜL (ref 1.8–7.8)
NEUTS SEG NFR BLD AUTO: 73 % (ref 45–65)
NONHDLC SERPL-MCNC: 71 MG/DL
PLATELET # BLD AUTO: 163 THOUSANDS/UL (ref 150–450)
PMV BLD AUTO: 10 FL (ref 8.9–12.7)
POTASSIUM SERPL-SCNC: 4.4 MMOL/L (ref 3.6–5)
PROT SERPL-MCNC: 6.8 G/DL (ref 5.9–8.4)
RBC # BLD AUTO: 4.63 MILLION/UL (ref 4.5–5.9)
SODIUM SERPL-SCNC: 145 MMOL/L (ref 137–147)
TRIGL SERPL-MCNC: 62 MG/DL
WBC # BLD AUTO: 6.6 THOUSAND/UL (ref 4.5–11)

## 2019-07-15 PROCEDURE — 80053 COMPREHEN METABOLIC PANEL: CPT

## 2019-07-15 PROCEDURE — 76706 US ABDL AORTA SCREEN AAA: CPT

## 2019-07-15 PROCEDURE — 83036 HEMOGLOBIN GLYCOSYLATED A1C: CPT

## 2019-07-15 PROCEDURE — 85025 COMPLETE CBC W/AUTO DIFF WBC: CPT

## 2019-07-15 PROCEDURE — 84153 ASSAY OF PSA TOTAL: CPT

## 2019-07-15 PROCEDURE — 36415 COLL VENOUS BLD VENIPUNCTURE: CPT

## 2019-07-15 PROCEDURE — 80061 LIPID PANEL: CPT

## 2019-07-21 LAB — MISCELLANEOUS LAB TEST RESULT: NORMAL

## 2019-07-29 ENCOUNTER — OFFICE VISIT (OUTPATIENT)
Dept: FAMILY MEDICINE CLINIC | Facility: CLINIC | Age: 74
End: 2019-07-29
Payer: COMMERCIAL

## 2019-07-29 VITALS
RESPIRATION RATE: 16 BRPM | BODY MASS INDEX: 34.38 KG/M2 | HEART RATE: 78 BPM | WEIGHT: 194 LBS | OXYGEN SATURATION: 96 % | SYSTOLIC BLOOD PRESSURE: 126 MMHG | DIASTOLIC BLOOD PRESSURE: 70 MMHG | HEIGHT: 63 IN | TEMPERATURE: 98 F

## 2019-07-29 DIAGNOSIS — Z23 NEED FOR VACCINATION AGAINST STREPTOCOCCUS PNEUMONIAE USING PNEUMOCOCCAL CONJUGATE VACCINE 13: ICD-10-CM

## 2019-07-29 DIAGNOSIS — N19 RENAL FAILURE, UNSPECIFIED CHRONICITY: Primary | ICD-10-CM

## 2019-07-29 PROBLEM — R73.9 HYPERGLYCEMIA: Status: ACTIVE | Noted: 2019-07-29

## 2019-07-29 PROCEDURE — G0009 ADMIN PNEUMOCOCCAL VACCINE: HCPCS | Performed by: FAMILY MEDICINE

## 2019-07-29 PROCEDURE — 90670 PCV13 VACCINE IM: CPT | Performed by: FAMILY MEDICINE

## 2019-07-29 PROCEDURE — 99213 OFFICE O/P EST LOW 20 MIN: CPT | Performed by: FAMILY MEDICINE

## 2019-07-29 NOTE — ASSESSMENT & PLAN NOTE
Previous laboratory data reported normal GFR  Delay this GFR from July 2019 is of 50  Today we did not find any reason for this change  We are going to repeat labs in three month if persistent decreased they will proceed with workup

## 2019-07-29 NOTE — PATIENT INSTRUCTIONS
JEWELS: LE VAN HA LLAMAR ANTES DE STEPHENS PROXIMA DANNA PARA REPETIR LABS  Enfermedad renal crónica, cuidados ambulatorios   INFORMACIÓN GENERAL:   La enfermedad renal crónica  es la pérdida gradual y [address] de la función renal  Bajo condiciones normales, los riñones BIBER líquidos, químicos y desperdicios de stephens anthony en orina  Cuando usted padece de enfermedad renal crónica, jose a riñones no funcionan correctamente  La enfermedad renal crónica puede llegar a empeorar con el tiempo y conducir a la insuficiencia renal   Síntomas comunes incluyen los siguientes:   · Cambios en la frecuencia con la que necesita orinar     · Inflamación en ojse a brazos, piernas o pies     · Falta de aliento    · Fatiga o debilidad    · Mal gusto o sabor amargo en stephens boca     · Náusea, vómito o pérdida de apetito  Busque cuidados inmediatos para los siguientes síntomas:   · Stephens corazón está latiendo más rápido de lo normal en stephens hailey  · Usted se siente confundido y [place]  · Usted sufre grady convulsión  · Usted tiene un dolor en el pecho repentino o falta de [place]  El tratamiento para la enfermedad renal crónica:  Se puede barbara medicamentos para disminuir la presión arterial y eliminar el liquido extra  Usted podría también recibir medicamento para controlar condiciones médicas que pueden llegar a suceder con stephens enfermedad  La diálisis es un tratamiento para remover químicos y desperdicios de stephens anthony cuando los riñones ya no pueden funcionar  Puede necesitarse de cirugía para crear Theodor Dillon arteriovenosa en stephens brazo o colocar grady sonda en stephens abdomen para que usted pueda recibir diálisis  Se puede hacer un trasplante de riñón si stephens enfermedad se convierte en severa  Maneje stephens enfermedad renal crónica:   · Mantenga un peso saludable  Pregúntele a stephens proveedor de [employer] pesar usted  [place] que le ayude a crear un plan de pérdida de peso si usted esta sobrepeso       · Ejercítese de 30 a 60 minutos al día, 4 a 7 veces por semana, o según indicaciones dadas  Pregunte sobre el mejor plan de ejercicios para usted  El ejercicio regular puede ayudarle a manejar stephens enfermedad, presión arterial dimitri y diabetes  · Siga el consejo de stephens proveedor de [employer] lo que usted debe comer y letty  Es probable que stephens proveedor de Pitney Lokesh indique que consuma alimentos bajos en sodio (sal), potasio, fósforo o proteína  Puede ser que usted tenga que ir donde un dietista si necesita ayuda para planear jose a comidas  · Limite el alcohol  Pregunte cuanto alcohol es seguro consumir  Grady bebida alcohólica equivale a 12 onzas de cerveza, 5 onzas de vino o 1½ onzas de licor  · No fume  El fumado daña jose a riñones  Si usted fuma, nunca es tarde para dejar de hacerlo  Solicite información si usted necesita ayuda para dejar de fumar  · Pregúntele a stephens proveedor de brenda si usted necesita vacunas  Las infecciones brent la neumonía, la gripe y la hepatitis pueden ser más dañinas u ocurrir con más frecuencia cuando usted sufre de enfermedad renal crónica  Las vacunas reducen stephens riesgo de infección con estos tipos de virus  Programe grady danny con stephens proveedor de [employer] se le haya indicado: Anote jose a preguntas para que se acuerde de hacerlas josé manuel jose a visitas  ACUERDOS SOBRE STEPHENS CUIDADO:   Usted tiene el derecho de participar en la planificación de stephens cuidado  Aprenda todo lo que pueda sobre stephens condición y brent darle tratamiento  Discuta con jose a médicos jose a opciones de tratamiento para juntos decidir el cuidado que usted quiere recibir  Usted siempre tiene el derecho a rechazar stephens tratamiento  Esta información es sólo para uso en educación  Stephens intención no es darle un consejo médico sobre enfermedades o tratamientos  Colsulte con stephens Mary Ann Boers farmacéutico antes de seguir cualquier régimen médico para saber si es seguro y efectivo para usted    © 2014 [address] is for End User's use only and may not be sold, redistributed or otherwise used for commercial purposes  All illustrations and images included in CareNotes® are the copyrighted property of A D A M , Inc  or Gonzalo Mcfadden

## 2019-07-29 NOTE — ASSESSMENT & PLAN NOTE
I explained to patient about hemoglobin A1c 6 4, these with him and high risk for diabetes  Recommendation was lifestyle modification  We are going to follow up hyperglycemia in three month

## 2019-08-13 ENCOUNTER — OFFICE VISIT (OUTPATIENT)
Dept: CARDIOLOGY CLINIC | Facility: CLINIC | Age: 74
End: 2019-08-13
Payer: COMMERCIAL

## 2019-08-13 VITALS
SYSTOLIC BLOOD PRESSURE: 98 MMHG | WEIGHT: 190.6 LBS | HEART RATE: 62 BPM | BODY MASS INDEX: 33.77 KG/M2 | OXYGEN SATURATION: 95 % | DIASTOLIC BLOOD PRESSURE: 60 MMHG | HEIGHT: 63 IN

## 2019-08-13 DIAGNOSIS — I25.10 CORONARY ARTERY DISEASE INVOLVING NATIVE CORONARY ARTERY OF NATIVE HEART WITHOUT ANGINA PECTORIS: Primary | ICD-10-CM

## 2019-08-13 DIAGNOSIS — E78.00 PURE HYPERCHOLESTEROLEMIA: ICD-10-CM

## 2019-08-13 DIAGNOSIS — Z95.1 S/P CABG X 3: ICD-10-CM

## 2019-08-13 DIAGNOSIS — I10 ESSENTIAL HYPERTENSION: ICD-10-CM

## 2019-08-13 PROCEDURE — 99214 OFFICE O/P EST MOD 30 MIN: CPT | Performed by: INTERNAL MEDICINE

## 2019-08-13 PROCEDURE — 93000 ELECTROCARDIOGRAM COMPLETE: CPT | Performed by: INTERNAL MEDICINE

## 2019-08-13 PROCEDURE — 4040F PNEUMOC VAC/ADMIN/RCVD: CPT | Performed by: INTERNAL MEDICINE

## 2019-08-13 NOTE — PROGRESS NOTES
Cardiology Follow Up    [facility]  1945  [id number]  [address] [id number] [phone]    1  Coronary artery disease involving native coronary artery of native heart without angina pectoris  POCT ECG   2  S/P CABG x 3     3  Essential hypertension     4  Pure hypercholesterolemia         Patient was 1st seen in our office on March 5, 2013 for midsternal chest discomfort and dyspnea on exertion  His electrocardiogram demonstrated old inferior wall myocardial infarction with possible lateral extension  Other diagnoses include hypertension, metabolic syndrome, hyperlipidemia  Cardiac catheterization demonstrated a 70 80% left anterior after the 2nd diagonal branch  Circumflex artery minor luminal irregularities  Right coronary artery dominant in distribution with a proximal 90% lesion  Patient underwent coronary artery bypass surgery with a LIMA to the LAD, SVG to the right coronary artery, SVG to the OM branch of the circumflex  03/19/2013 echocardiogram: Normal left ventricular systolic and diastolic function  Mildly elevated left ventricular filling pressures  Aortic sclerosis with mild aortic regurgitation  Moderately severe concentric left ventricular hypertrophy  07/15/2019 lipid profile: Total cholesterol 105, triglycerides 62, HDL 34, LDL calculated 59, non HDL cholesterol 71  Interval History:  Patient with a history of CABG returns  He denies chest discomfort or shortness of breath  He denies palpitations  He denies leg edema  His blood pressure initially was 98/60 by the nurses but I repeated it and it was 110/60  His heart rate was somewhat irregular  An electrocardiogram was obtained which demonstrated frequent premature atrial contractions  His lipid profile is excellent      Patient Active Problem List   Diagnosis    CAD (coronary artery disease)    Diverticulosis    History of erectile dysfunction    History of tobacco use    Hypertension    Hyperlipidemia    Old myocardial infarction    S/P CABG x 3    Renal failure    Hyperglycemia     Past Medical History:   Diagnosis Date    CAD (coronary artery disease)     Diverticulosis     Hyperglycemia     Hyperlipidemia     Hypertension     Obesity      Social History     Socioeconomic History    Marital status: /Civil Union     Spouse name: Not on file    Number of children: Not on file    Years of education: Not on file    Highest education level: Not on file   Occupational History    Not on file   Social Needs    Financial resource strain: Not on file    Food insecurity:     Worry: Not on file     Inability: Not on file    Transportation needs:     Medical: Not on file     Non-medical: Not on file   Tobacco Use    Smoking status: Former Smoker     Types: Cigarettes     Last attempt to quit: 2012     Years since quittin 6    Smokeless tobacco: Never Used   Substance and Sexual Activity    Alcohol use: Yes     Frequency: 2-4 times a month     Drinks per session: 1 or 2     Binge frequency: Never    Drug use: No    Sexual activity: Yes     Partners: Female   Lifestyle    Physical activity:     Days per week: Not on file     Minutes per session: Not on file    Stress: Not on file   Relationships    Social connections:     Talks on phone: Not on file     Gets together: Not on file     Attends Confucianist service: Not on file     Active member of club or organization: Not on file     Attends meetings of clubs or organizations: Not on file     Relationship status: Not on file    Intimate partner violence:     Fear of current or ex partner: Not on file     Emotionally abused: Not on file     Physically abused: Not on file     Forced sexual activity: Not on file   Other Topics Concern    Not on file   Social History Narrative    Not on file      Family History   Problem Relation Age of Onset    Hypertension Mother      Past Surgical History:   Procedure Laterality Date    CARDIOVASCULAR STRESS TEST  03/20/2013    COLONOSCOPY      onset: 9/21/9    COLONOSCOPY W/ POLYPECTOMY      2/6/17:colon,polyp      CORONARY ARTERY BYPASS GRAFT  01/21/2016    x3       Current Outpatient Medications:     aspirin 81 mg chewable tablet, Chew 1 tablet (81 mg total) every 24 hours, Disp: 90 tablet, Rfl: 3    atorvastatin (LIPITOR) 80 mg tablet, Take 1 tablet (80 mg total) by mouth every 24 hours, Disp: 90 tablet, Rfl: 3    clopidogrel (PLAVIX) 75 mg tablet, Take 1 tablet (75 mg total) by mouth every 24 hours, Disp: 90 tablet, Rfl: 3    ezetimibe (ZETIA) 10 mg tablet, Take 1 tablet (10 mg total) by mouth daily, Disp: 90 tablet, Rfl: 3    metoprolol succinate (TOPROL-XL) 100 mg 24 hr tablet, Take 1 tablet (100 mg total) by mouth every 24 hours, Disp: 90 tablet, Rfl: 3    olmesartan-hydrochlorothiazide (BENICAR HCT) 40-12 5 MG per tablet, Take 1 tablet by mouth daily, Disp: 90 tablet, Rfl: 3    tamsulosin (FLOMAX) 0 4 mg, Take 1 capsule (0 4 mg total) by mouth every 24 hours, Disp: 90 capsule, Rfl: 3  Allergies   Allergen Reactions    No Active Allergies        Results for orders placed or performed in visit on 08/13/19   POCT ECG    Narrative    Sinus bradycardia with 1st degree AV block infrequent premature atrial contractions  Cannot rule out old inferior wall myocardial infarction  Abnormal EKG           Lab Results   Component Value Date    CHOL 122 04/27/2018     Lab Results   Component Value Date    HDL 34 (L) 07/15/2019    HDL 32 (L) 11/12/2018    HDL 29 (L) 04/27/2018     Lab Results   Component Value Date    LDLCALC 59 07/15/2019    LDLCALC 81 11/12/2018    LDLCALC 65 04/27/2018     Lab Results   Component Value Date    TRIG 62 07/15/2019    TRIG 85 11/12/2018    TRIG 142 04/27/2018     No results found for: CHOLHDL  Lab Results   Component Value Date    SODIUM 145 07/15/2019    K 4 4 07/15/2019     (H) 07/15/2019    CO2 26 07/15/2019    BUN 21 07/15/2019    CREATININE 1 38 07/15/2019    CALCIUM 9 1 07/15/2019     Lab Results   Component Value Date     04/27/2018    SODIUM 145 07/15/2019    K 4 4 07/15/2019     (H) 07/15/2019    CO2 26 07/15/2019    ANIONGAP 11 04/27/2018    AGAP 9 07/15/2019    BUN 21 07/15/2019    CREATININE 1 38 07/15/2019    GLUF 105 (H) 07/15/2019    CALCIUM 9 1 07/15/2019    AST 27 07/15/2019    ALT 47 07/15/2019    ALKPHOS 95 07/15/2019    PROT 6 6 04/27/2018    TP 6 8 07/15/2019    BILITOT 0 8 04/27/2018    TBILI 0 60 07/15/2019    EGFR 50 (L) 07/15/2019     Lab Results   Component Value Date    WBC 6 60 07/15/2019    HGB 14 5 07/15/2019    HCT 43 7 07/15/2019    MCV 94 07/15/2019     07/15/2019     Lab Results   Component Value Date    NLA2CCCIXVFR 1 320 04/27/2018         Result Date: 7/16/2019  Narrative: ABDOMINAL AORTIC ULTRASOUND INDICATION:   Z13 6: Encounter for screening for cardiovascular disorders  COMPARISON: None FINDINGS: Ultrasound of the abdominal aorta was performed in longitudinal and transverse planes with a curvilinear transducer  Proximal aorta:  2 4 x 1 9 cm Mid aorta:    2 x 1 7 cm Distal aorta:    1 6 x 1 2 cm The iliac arteries/aortic bifurcation is not well-visualized secondary to shadowing from adjacent bowel gas  No periaortic collections or adenopathy detected  Impression: No evidence for abdominal aortic aneurysm  Limited evaluation of the aortic bifurcation/bilateral iliac artery secondary to shadowing from adjacent bowel gas  Workstation performed: [id number]           Review of Systems:  Review of Systems   Constitutional: Negative for activity change  Respiratory: Negative for cough, chest tightness, shortness of breath and wheezing  Cardiovascular: Negative for chest pain, palpitations and leg swelling  Musculoskeletal: Negative for gait problem  Skin: Negative for color change     Neurological: Negative for dizziness, tremors, syncope, weakness, light-headedness and headaches  Psychiatric/Behavioral: Negative for agitation and confusion  Physical Exam:  BP 98/60   Pulse 62   Ht 5' 3" (1 6 m)   Wt 86 5 kg (190 lb 9 6 oz)   SpO2 95%   BMI 33 76 kg/m²    Physical Exam   Constitutional: He is oriented to person, place, and time  He appears well-developed and well-nourished  No distress  HENT:   Head: Normocephalic and atraumatic  Neck: No JVD present  Cardiovascular: Normal rate, regular rhythm, normal heart sounds and intact distal pulses  Exam reveals no gallop and no friction rub  No murmur heard  Pulmonary/Chest: Effort normal and breath sounds normal  No respiratory distress  He has no wheezes  He has no rales  He exhibits no tenderness  Abdominal: Soft  Bowel sounds are normal  He exhibits no distension  Musculoskeletal: He exhibits no edema  Neurological: He is alert and oriented to person, place, and time  Skin: Skin is warm and dry  Psychiatric: He has a normal mood and affect  His behavior is normal        Discussion/Summary:  1  Continue present management  2  Return in 3 months

## 2019-10-28 ENCOUNTER — TELEPHONE (OUTPATIENT)
Dept: FAMILY MEDICINE CLINIC | Facility: CLINIC | Age: 74
End: 2019-10-28

## 2019-10-28 DIAGNOSIS — N19 RENAL FAILURE, UNSPECIFIED CHRONICITY: Primary | ICD-10-CM

## 2019-10-28 NOTE — TELEPHONE ENCOUNTER
----- Message from Dharmesh Brown MD sent at 10/28/2019  8:55 AM EDT -----  Please call patient to go to lab previous next appt  ----- Message -----  From: Dharmesh Brown MD  Sent: 7/29/2019  10:40 AM EDT  To: Dharmesh Brown MD, #    Repeat BMP previous next appointment

## 2019-10-29 ENCOUNTER — APPOINTMENT (OUTPATIENT)
Dept: LAB | Facility: HOSPITAL | Age: 74
End: 2019-10-29
Payer: COMMERCIAL

## 2019-10-29 DIAGNOSIS — N19 RENAL FAILURE, UNSPECIFIED CHRONICITY: ICD-10-CM

## 2019-10-29 LAB
ANION GAP SERPL CALCULATED.3IONS-SCNC: 4 MMOL/L (ref 5–14)
BUN SERPL-MCNC: 14 MG/DL (ref 5–25)
CALCIUM SERPL-MCNC: 9.2 MG/DL (ref 8.4–10.2)
CHLORIDE SERPL-SCNC: 107 MMOL/L (ref 97–108)
CO2 SERPL-SCNC: 29 MMOL/L (ref 22–30)
CREAT SERPL-MCNC: 1.33 MG/DL (ref 0.7–1.5)
GFR SERPL CREATININE-BSD FRML MDRD: 53 ML/MIN/1.73SQ M
GLUCOSE P FAST SERPL-MCNC: 100 MG/DL (ref 70–99)
POTASSIUM SERPL-SCNC: 5.5 MMOL/L (ref 3.6–5)
SODIUM SERPL-SCNC: 140 MMOL/L (ref 137–147)

## 2019-10-29 PROCEDURE — 36415 COLL VENOUS BLD VENIPUNCTURE: CPT

## 2019-10-29 PROCEDURE — 80048 BASIC METABOLIC PNL TOTAL CA: CPT

## 2019-11-04 ENCOUNTER — OFFICE VISIT (OUTPATIENT)
Dept: FAMILY MEDICINE CLINIC | Facility: CLINIC | Age: 74
End: 2019-11-04
Payer: COMMERCIAL

## 2019-11-04 VITALS
HEIGHT: 63 IN | BODY MASS INDEX: 34.02 KG/M2 | HEART RATE: 68 BPM | DIASTOLIC BLOOD PRESSURE: 70 MMHG | WEIGHT: 192 LBS | OXYGEN SATURATION: 97 % | SYSTOLIC BLOOD PRESSURE: 120 MMHG | TEMPERATURE: 98 F | RESPIRATION RATE: 16 BRPM

## 2019-11-04 DIAGNOSIS — I49.8 SINUS ARRHYTHMIA: ICD-10-CM

## 2019-11-04 DIAGNOSIS — E87.5 HYPERKALEMIA: ICD-10-CM

## 2019-11-04 DIAGNOSIS — Z95.1 S/P CABG X 3: ICD-10-CM

## 2019-11-04 DIAGNOSIS — Z23 NEEDS FLU SHOT: Primary | ICD-10-CM

## 2019-11-04 PROCEDURE — 99214 OFFICE O/P EST MOD 30 MIN: CPT | Performed by: FAMILY MEDICINE

## 2019-11-04 PROCEDURE — 93000 ELECTROCARDIOGRAM COMPLETE: CPT | Performed by: FAMILY MEDICINE

## 2019-11-04 PROCEDURE — G0008 ADMIN INFLUENZA VIRUS VAC: HCPCS | Performed by: FAMILY MEDICINE

## 2019-11-04 PROCEDURE — 90662 IIV NO PRSV INCREASED AG IM: CPT | Performed by: FAMILY MEDICINE

## 2019-11-04 NOTE — PATIENT INSTRUCTIONS
Hipertensión, cuidados ambulatorios   INFORMACIÓN GENERAL:   La hipertensión  es la presión arterial dimitri  La presión arterial es la fuerza que ejerce la anthony contra las escobar de las arterias  La presión arterial dimitri estaría a 140 / 90 o más dimitri  La hipertensión causa que stephens presión arterial se eleve tanto que stephens corazón se ve forzado a trabajar [employer] de lo normal, lo cual puede causar daño al corazón  Síntomas comunes incluyen los siguientes:   · Dolor de bruce     · Visión borrosa     · Dolor de pecho     · Mareos o debilidad     · Dificultad para respirar     · Sangrado de nariz  Busque cuidados inmediatos para los siguientes síntomas:   · Dolor de bruce severo o pérdida de la visión    · Debilidad en un brazo o pierna    · Confusión o dificultad para hablar    · [employer] en el pecho brent grady sensación de estrujamiento, presión, llenura o dolor    · Sensación de desvanecimiento repentino o dificultad para respirar    · Dolor o molestias en la espalda, chester, mandíbula, estómago o brazo  El tratamiento para la hipertensión  puede llegar a incluir medicamentos para bajar stephens presión arterial  Es probable que usted también necesite hacer algunos cambios en stephens estilo de jose a  Kelly stephens medicamento exactamente brent se lo indiquen  Maneje stephens hipertensión:   · Tómese la presión en casa  Siéntese y descanse por 5 minutos antes de tomarse la presión  Extienda stephens brazo y apóyelo sobre grady superficie plana  Stephens brazo debe estar al mismo nivel que stephens corazón  Siga las indicaciones que vienen con stephens monitor de presión arterial  Si es posible, tómese al menos 2 lecturas cada vez que se elva la presión  Tómese la presión por lo [employer] al día, al mismo tiempo cada día, brent por Yamila Diones y la noche  Anote en stephens diario las lecturas de stephens presión y lleve stephens diario a jose a citas de seguimiento  · Coma menos sodio (sal)  No le agregue sal a jose a alimentos   Limite stephens consumo de alimentos altos en sodio, brent por ejemplo comidas enlatadas, fadi tostadas, y anita para emparedado  Stephens proveedor de brenda podría sugerirle que siga el plan de alimentación con enfoque dietético para reducir la hipertensión conocido brent dieta DASH, por jose a siglas en inglés  Jil plan es bajo en sodio, grasas que no son saludables y grasas en stephens totalidad  El plan es alto en potasio, calcio y Yaima  · Ejercítese regularmente  Ejercítese por lo menos 30 minutos por día, la [employer] días de la Chemult  [place] ayudará a bajar stephens presión arterial  Pregúntele a stephens proveedor de [place] plan de ejercicios para usted  · Limite el consumo de alcohol  Las mujeres deben limitar el consumo de alcohol a 1 trago al día, mientras los hombres deben limitarlo a 2 tragos al día  Un trago de alcohol equivale a 12 onzas de cerveza, 5 onzas de vino, o 1½ onzas de licor  · No fume  Si usted fuma, nunca es tarde para dejar de fumar  El tabaquismo [place] stephens presión arterial  Fumar también empeora otras condiciones de brenda que usted podría tener y que a la vez aumentan stephens riesgo de hipertensión  Solicite a stephens proveedor de [employer] información si necesita ayuda para dejar de fumar  Programe un danny con stephens proveedor de brenda según indicaciones:  Usted tendrá que regresar para que le revisen la presión arterial y para que le arya otras pruebas de laboratorio  Anote jose a preguntas para que las recuerde josé manuel jose a citas de seguimiento  ACUERDOS SOBRE STEPHENS CUIDADO:   Usted tiene el derecho de participar en la planificación de stephens cuidado  Aprenda todo lo que pueda sobre stephens condición y brent darle tratamiento  Discuta con jose a médicos jose a opciones de tratamiento para juntos decidir el cuidado que usted quiere recibir  Usted siempre tiene el derecho a rechazar stephens tratamiento  Esta información es sólo para uso en educación  Stephens intención no es darle un consejo médico sobre enfermedades o tratamientos   Colsulte con stephens Brett Barbosa o farmacéutico antes de seguir cualquier régimen médico para saber si es seguro y efectivo para usted  © 2014 [address] is for End User's use only and may not be sold, redistributed or otherwise used for commercial purposes  All illustrations and images included in CareNotes® are the copyrighted property of A D A M , Inc  or Gonzalo Mcfadden  Monitoreo por telemetría   LO QUE NECESITA SABER:   ¿Qué es el monitoreo por telemetría? Se llama monitoreo por telemetría al examen en el que los médicos supervisan la actividad eléctrica de stephens corazón josé manuel un período de tiempo prolongado  El latido del corazón está controlado por señales eléctricas  Los registros tomados josé manuel el monitoreo por telemetría muestran a los médicos si existe algún problema en la forma en que stephens corazón late  ¿Por qué podría necesitar monitoreo por telemetría? Pregunte a stephens médico acerca de estos y otros motivos por los cuales el monitoreo por telemetría podría ser [employer] en stephens hailey:  · Usted tiene un problema cardíaco, brent un ataque cardíaco, dolor en el pecho o latido irregular del corazón  · Usted tiene un problema en los pulmones, brent un coágulo sanguíneo o acumulación de líquido en los pulmones  · Usted se somete a grady cirugía con anestesia o sedación  · Usted elva ciertos medicamentos para regular los latidos de stephens corazón  · Usted tiene grady lesión o se encuentra en estado de shock  · Usted tiene grady condición de [place], brent un derrame cerebral o insuficiencia renal   ¿Cómo se hace el monitoreo por telemetría? Los médicos preparan stephens piel limpiándola  Ponen entre 3 y 15 electrodos (raj Bass) sobre el área de stephens pecho y [id number]  También es posible que le coloquen electrodos en los brazos o las piernas  Se une un cable a cada electrodo  Estos cables también están unidos a un pequeño dispositivo   El dispositivo envía información sobre la actividad eléctrica de [place] corazón a grady estación de monitoreo  Un médico en la estación revisa constantemente la información  El médico se fija si hay algún problema o cambio en la forma en que funciona stephens corazón  Puede holland si se produce un problema o si es probable que se produzca un problema  ¿Rommel cuánto tiempo tendré el monitoreo por telemetría? El monitoreo por telemetría puede durar desde 24 horas a más de 72 horas  Los médicos examinarán stephens estado de brenda cada día y decidirán si se debe continuar con la telemetría  ¿Cuándo rick comunicarme con mi médico?   · Usted tiene alguna pregunta acerca del monitoreo por telemetría  · Tiene preguntas e inquietudes acerca de stephens condición  ¿Cuándo rick buscar atención inmediata o llamar al 911? · Usted tiene alguno de los siguientes signos de un ataque cardíaco:      ¨ Estornudos, presión, o dolor en stephens pecho que dura mas de 5 minutos o regresa  ¨ Malestar o dolor en stephens espalda, chester, mandíbula, abdomen, o brazo     ¨ Dificultad para respirar    ¨ Náuseas o vómito    ¨ Siente un desvanecimiento o tiene sudores fríos especialmente en el pecho o dificultad para respirar  · Usted tiene debilidad o entumecimiento en el Rhianna Martín, pierna o andres  · Usted se siente confundido o tiene dificultad para hablar  · Usted tiene un rivera dolor de bruce  · Usted pierde la visión en nini o ambos ojos  · Usted se siente demasiado mareado brent para ponerse de pie  ACUERDOS SOBRE STEPHENS CUIDADO:   Usted tiene el derecho de ayudar a planear stephens cuidado  Aprenda todo lo que pueda sobre stephens condición y brent darle tratamiento  Discuta jose a opciones de tratamiento con jose a médicos para decidir el cuidado que usted desea recibir  Usted siempre tiene el derecho de rechazar el tratamiento  Esta información es sólo para uso en educación  Stephens intención no es darle un consejo médico sobre enfermedades o tratamientos   Colsulte con stephens Odean Dose farmacéutico antes de seguir cualquier régimen [employer] para saber si es seguro y efectivo para usted  © 2017 [address] is for End User's use only and may not be sold, redistributed or otherwise used for commercial purposes  All illustrations and images included in CareNotes® are the copyrighted property of A D A M , Inc  or Gonzalo Mcfadden

## 2019-11-04 NOTE — ASSESSMENT & PLAN NOTE
Patient told to continue to exercise at least 3 times a week  Patient to watch his salt intake and diet  Patient to continue taking medication as directed
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
## 2019-11-10 PROBLEM — E87.5 HYPERKALEMIA: Status: ACTIVE | Noted: 2019-11-10

## 2019-11-10 PROBLEM — I49.8 SINUS ARRHYTHMIA: Status: ACTIVE | Noted: 2019-11-10

## 2019-11-10 NOTE — ASSESSMENT & PLAN NOTE
I reviewed previous laboratory data and consulting notes, we are going to repeat labs to ensure stability  '

## 2019-11-11 ENCOUNTER — APPOINTMENT (OUTPATIENT)
Dept: LAB | Facility: HOSPITAL | Age: 74
End: 2019-11-11
Payer: COMMERCIAL

## 2019-11-11 DIAGNOSIS — E87.5 HYPERKALEMIA: ICD-10-CM

## 2019-11-11 LAB
ALBUMIN SERPL BCP-MCNC: 3.8 G/DL (ref 3–5.2)
ALP SERPL-CCNC: 107 U/L (ref 43–122)
ALT SERPL W P-5'-P-CCNC: 58 U/L (ref 9–52)
ANION GAP SERPL CALCULATED.3IONS-SCNC: 6 MMOL/L (ref 5–14)
AST SERPL W P-5'-P-CCNC: 45 U/L (ref 17–59)
BILIRUB SERPL-MCNC: 0.8 MG/DL
BUN SERPL-MCNC: 25 MG/DL (ref 5–25)
CALCIUM SERPL-MCNC: 8.6 MG/DL (ref 8.4–10.2)
CHLORIDE SERPL-SCNC: 107 MMOL/L (ref 97–108)
CO2 SERPL-SCNC: 26 MMOL/L (ref 22–30)
CREAT SERPL-MCNC: 1.19 MG/DL (ref 0.7–1.5)
GFR SERPL CREATININE-BSD FRML MDRD: 60 ML/MIN/1.73SQ M
GLUCOSE P FAST SERPL-MCNC: 108 MG/DL (ref 70–99)
POTASSIUM SERPL-SCNC: 4.9 MMOL/L (ref 3.6–5)
PROT SERPL-MCNC: 7 G/DL (ref 5.9–8.4)
SODIUM SERPL-SCNC: 139 MMOL/L (ref 137–147)

## 2019-11-11 PROCEDURE — 36415 COLL VENOUS BLD VENIPUNCTURE: CPT

## 2019-11-11 PROCEDURE — 80053 COMPREHEN METABOLIC PANEL: CPT

## 2019-11-18 ENCOUNTER — OFFICE VISIT (OUTPATIENT)
Dept: CARDIOLOGY CLINIC | Facility: CLINIC | Age: 74
End: 2019-11-18
Payer: COMMERCIAL

## 2019-11-18 VITALS
HEART RATE: 62 BPM | WEIGHT: 195.6 LBS | OXYGEN SATURATION: 98 % | DIASTOLIC BLOOD PRESSURE: 68 MMHG | BODY MASS INDEX: 34.66 KG/M2 | HEIGHT: 63 IN | SYSTOLIC BLOOD PRESSURE: 120 MMHG

## 2019-11-18 DIAGNOSIS — E78.00 PURE HYPERCHOLESTEROLEMIA: ICD-10-CM

## 2019-11-18 DIAGNOSIS — I25.10 CORONARY ARTERY DISEASE INVOLVING NATIVE CORONARY ARTERY OF NATIVE HEART WITHOUT ANGINA PECTORIS: Primary | ICD-10-CM

## 2019-11-18 DIAGNOSIS — Z95.1 S/P CABG X 3: ICD-10-CM

## 2019-11-18 DIAGNOSIS — I10 ESSENTIAL HYPERTENSION: ICD-10-CM

## 2019-11-18 PROCEDURE — 99214 OFFICE O/P EST MOD 30 MIN: CPT | Performed by: INTERNAL MEDICINE

## 2019-11-18 NOTE — PROGRESS NOTES
Cardiology Follow Up    [facility]  1945  [id number]  [address] [address] [phone]    1  Coronary artery disease involving native coronary artery of native heart without angina pectoris     2  Essential hypertension     3  S/P CABG x 3     4  Pure hypercholesterolemia         Patient was 1st seen in our office on March 5, 2013 for midsternal chest discomfort and dyspnea on exertion  His electrocardiogram demonstrated old inferior wall myocardial infarction with possible lateral extension  Other diagnoses include hypertension, metabolic syndrome, hyperlipidemia  Cardiac catheterization demonstrated a 70 80% left anterior after the 2nd diagonal branch  Circumflex artery minor luminal irregularities  Right coronary artery dominant in distribution with a proximal 90% lesion  Patient underwent coronary artery bypass surgery with a LIMA to the LAD, SVG to the right coronary artery, SVG to the OM branch of the circumflex  03/19/2013 echocardiogram: Normal left ventricular systolic and diastolic function  Mildly elevated left ventricular filling pressures  Aortic sclerosis with mild aortic regurgitation  Moderately severe concentric left ventricular hypertrophy  07/15/2019 lipid profile: Total cholesterol 105, triglycerides 62, HDL 34, LDL calculated 59, non HDL cholesterol 71  Interval History:  Patient with a history of coronary artery disease and prior CABG returns  He has no complaints and feels great  He denies chest discomfort, shortness of breath, palpitations or leg edema  He has no symptoms of dizziness or lightheadedness  Blood pressure is excellent  His last lipid profile was excellent    Patient Active Problem List   Diagnosis    CAD (coronary artery disease)    Diverticulosis    History of erectile dysfunction    History of tobacco use    Hypertension    Hyperlipidemia  Old myocardial infarction    S/P CABG x 3    Renal failure    Hyperglycemia    Hyperkalemia    Sinus arrhythmia     Past Medical History:   Diagnosis Date    CAD (coronary artery disease)     Diverticulosis     Hyperglycemia     Hyperlipidemia     Hypertension     Obesity      Social History     Socioeconomic History    Marital status: /Civil Union     Spouse name: Not on file    Number of children: Not on file    Years of education: Not on file    Highest education level: Not on file   Occupational History    Not on file   Social Needs    Financial resource strain: Not on file    Food insecurity:     Worry: Not on file     Inability: Not on file    Transportation needs:     Medical: Not on file     Non-medical: Not on file   Tobacco Use    Smoking status: Former Smoker     Types: Cigarettes     Last attempt to quit: 2012     Years since quittin 8    Smokeless tobacco: Never Used   Substance and Sexual Activity    Alcohol use: Yes     Frequency: 2-4 times a month     Drinks per session: 1 or 2     Binge frequency: Never    Drug use: No    Sexual activity: Yes     Partners: Female   Lifestyle    Physical activity:     Days per week: Not on file     Minutes per session: Not on file    Stress: Not on file   Relationships    Social connections:     Talks on phone: Not on file     Gets together: Not on file     Attends Mu-ism service: Not on file     Active member of club or organization: Not on file     Attends meetings of clubs or organizations: Not on file     Relationship status: Not on file    Intimate partner violence:     Fear of current or ex partner: Not on file     Emotionally abused: Not on file     Physically abused: Not on file     Forced sexual activity: Not on file   Other Topics Concern    Not on file   Social History Narrative    Not on file      Family History   Problem Relation Age of Onset    Hypertension Mother      Past Surgical History: Procedure Laterality Date    CARDIOVASCULAR STRESS TEST  03/20/2013    COLONOSCOPY      onset: 9/21/9    COLONOSCOPY W/ POLYPECTOMY      2/6/17:colon,polyp      CORONARY ARTERY BYPASS GRAFT  01/21/2016    x3       Current Outpatient Medications:     aspirin 81 mg chewable tablet, Chew 1 tablet (81 mg total) every 24 hours, Disp: 90 tablet, Rfl: 3    atorvastatin (LIPITOR) 80 mg tablet, Take 1 tablet (80 mg total) by mouth every 24 hours, Disp: 90 tablet, Rfl: 3    clopidogrel (PLAVIX) 75 mg tablet, Take 1 tablet (75 mg total) by mouth every 24 hours, Disp: 90 tablet, Rfl: 3    ezetimibe (ZETIA) 10 mg tablet, Take 1 tablet (10 mg total) by mouth daily, Disp: 90 tablet, Rfl: 3    metoprolol succinate (TOPROL-XL) 100 mg 24 hr tablet, Take 1 tablet (100 mg total) by mouth every 24 hours, Disp: 90 tablet, Rfl: 3    olmesartan-hydrochlorothiazide (BENICAR HCT) 40-12 5 MG per tablet, Take 1 tablet by mouth daily, Disp: 90 tablet, Rfl: 3    tamsulosin (FLOMAX) 0 4 mg, Take 1 capsule (0 4 mg total) by mouth every 24 hours, Disp: 90 capsule, Rfl: 3  Allergies   Allergen Reactions    No Active Allergies        Lab Results   Component Value Date    CHOLESTEROL 105 07/15/2019    CHOLESTEROL 130 11/12/2018     Lab Results   Component Value Date    HDL 34 (L) 07/15/2019    HDL 32 (L) 11/12/2018    HDL 29 (L) 04/27/2018     Lab Results   Component Value Date    TRIG 62 07/15/2019    TRIG 85 11/12/2018    TRIG 142 04/27/2018     Lab Results   Component Value Date    NONHDLC 71 07/15/2019     Lab Results   Component Value Date    SODIUM 139 11/11/2019    K 4 9 11/11/2019     11/11/2019    CO2 26 11/11/2019    BUN 25 11/11/2019    CREATININE 1 19 11/11/2019    CALCIUM 8 6 11/11/2019     Lab Results   Component Value Date     04/27/2018    SODIUM 139 11/11/2019    K 4 9 11/11/2019     11/11/2019    CO2 26 11/11/2019    ANIONGAP 11 04/27/2018    AGAP 6 11/11/2019    BUN 25 11/11/2019    CREATININE 1 19 11/11/2019    GLUF 108 (H) 11/11/2019    CALCIUM 8 6 11/11/2019    AST 45 11/11/2019    ALT 58 (H) 11/11/2019    ALKPHOS 107 11/11/2019    PROT 6 6 04/27/2018    TP 7 0 11/11/2019    BILITOT 0 8 04/27/2018    TBILI 0 80 11/11/2019    EGFR 60 (L) 11/11/2019     Lab Results   Component Value Date    WBC 6 60 07/15/2019    HGB 14 5 07/15/2019    HCT 43 7 07/15/2019    MCV 94 07/15/2019     07/15/2019     Lab Results   Component Value Date    TQC6KQYQIWOG 1 320 04/27/2018         Review of Systems:  Review of Systems   Constitutional: Negative for activity change  Respiratory: Negative for cough, chest tightness, shortness of breath and wheezing  Cardiovascular: Negative for chest pain, palpitations and leg swelling  Musculoskeletal: Negative for gait problem  Skin: Negative for color change  Neurological: Negative for dizziness, tremors, syncope, weakness, light-headedness and headaches  Psychiatric/Behavioral: Negative for agitation and confusion  Physical Exam:  /68 (BP Location: Left arm, Patient Position: Sitting, Cuff Size: Large)   Pulse 62   Ht 5' 3" (1 6 m)   Wt 88 7 kg (195 lb 9 6 oz)   SpO2 98%   BMI 34 65 kg/m²    Physical Exam   Constitutional: He is oriented to person, place, and time  He appears well-developed and well-nourished  No distress  HENT:   Head: Normocephalic and atraumatic  Neck: No JVD present  Cardiovascular: Normal rate, regular rhythm, normal heart sounds and intact distal pulses  Exam reveals no gallop and no friction rub  No murmur heard  Pulmonary/Chest: Effort normal and breath sounds normal  No respiratory distress  He has no wheezes  He has no rales  He exhibits no tenderness  Abdominal: Soft  Bowel sounds are normal  He exhibits no distension  Musculoskeletal: He exhibits no edema  Neurological: He is alert and oriented to person, place, and time  Skin: Skin is warm and dry  Psychiatric: He has a normal mood and affect   His behavior is normal        Discussion/Summary:  Return in 6 months

## 2020-02-04 ENCOUNTER — OFFICE VISIT (OUTPATIENT)
Dept: FAMILY MEDICINE CLINIC | Facility: CLINIC | Age: 75
End: 2020-02-04
Payer: COMMERCIAL

## 2020-02-04 VITALS
SYSTOLIC BLOOD PRESSURE: 130 MMHG | WEIGHT: 198 LBS | TEMPERATURE: 98.1 F | RESPIRATION RATE: 16 BRPM | OXYGEN SATURATION: 96 % | HEART RATE: 60 BPM | HEIGHT: 63 IN | DIASTOLIC BLOOD PRESSURE: 70 MMHG | BODY MASS INDEX: 35.08 KG/M2

## 2020-02-04 DIAGNOSIS — I10 ESSENTIAL HYPERTENSION, MALIGNANT: Primary | ICD-10-CM

## 2020-02-04 DIAGNOSIS — E78.00 PURE HYPERCHOLESTEROLEMIA: ICD-10-CM

## 2020-02-04 DIAGNOSIS — R73.9 HYPERGLYCEMIA: ICD-10-CM

## 2020-02-04 PROCEDURE — 99214 OFFICE O/P EST MOD 30 MIN: CPT | Performed by: FAMILY MEDICINE

## 2020-02-04 PROCEDURE — 1160F RVW MEDS BY RX/DR IN RCRD: CPT | Performed by: FAMILY MEDICINE

## 2020-02-04 PROCEDURE — 3075F SYST BP GE 130 - 139MM HG: CPT | Performed by: FAMILY MEDICINE

## 2020-02-04 PROCEDURE — 4040F PNEUMOC VAC/ADMIN/RCVD: CPT | Performed by: FAMILY MEDICINE

## 2020-02-04 PROCEDURE — 3008F BODY MASS INDEX DOCD: CPT | Performed by: FAMILY MEDICINE

## 2020-02-04 PROCEDURE — 3078F DIAST BP <80 MM HG: CPT | Performed by: FAMILY MEDICINE

## 2020-02-04 PROCEDURE — 1036F TOBACCO NON-USER: CPT | Performed by: FAMILY MEDICINE

## 2020-02-04 NOTE — PROGRESS NOTES
Assessment/Plan:  1  Pure hypercholesterolemia  Continue statin therapy and exercise, control diet  - Lipid panel; Future    2  Hyperglycemia  To rule out diabetes  - HEMOGLOBIN A1C W/ EAG ESTIMATION; Future    3  Essential hypertension, malignant  Explained to patient the best way to control hypertension is with weight control, exercise, decrease salt in diet, avoid use of alcohol  Patient states he exercise but is increasing weight every visit  The risk of cardiovascular event was explained to patient  - Comprehensive metabolic panel; Future    No problem-specific Assessment & Plan notes found for this encounter  Diagnoses and all orders for this visit:    Essential hypertension, malignant  -     Comprehensive metabolic panel; Future    Pure hypercholesterolemia  -     Lipid panel; Future    Hyperglycemia  -     HEMOGLOBIN A1C W/ EAG ESTIMATION; Future          Subjective:      Patient ID: Scarlett Cartwright is a 69 y o  male  Patient is here to follow HTN, patient states good compliance with treatment  Denies chest pain, shortness of breath, urinary problems  No exercise but follows low salt diet  The following portions of the patient's history were reviewed and updated as appropriate: allergies, current medications, past family history, past medical history, past social history, past surgical history and problem list     Review of Systems   Constitutional: Negative for appetite change, diaphoresis, fatigue and fever  HENT: Negative for congestion, dental problem, drooling and ear discharge  Eyes: Negative for pain, discharge, redness and itching  Respiratory: Negative for apnea, cough, choking and chest tightness  Cardiovascular: Negative for chest pain, palpitations and leg swelling  Gastrointestinal: Negative for abdominal distention, abdominal pain, anal bleeding and blood in stool  Endocrine: Negative for cold intolerance, heat intolerance, polydipsia and polyphagia  Genitourinary: Negative for difficulty urinating, dysuria, enuresis and flank pain  Musculoskeletal: Negative for arthralgias, back pain, gait problem and joint swelling  Skin: Negative for color change, pallor, rash and wound  Allergic/Immunologic: Negative for environmental allergies, food allergies and immunocompromised state  Neurological: Negative for dizziness, facial asymmetry, light-headedness and headaches  Hematological: Negative for adenopathy  Does not bruise/bleed easily  Psychiatric/Behavioral: Negative for agitation, behavioral problems, confusion and decreased concentration  Objective:      /70 (BP Location: Left arm, Patient Position: Sitting, Cuff Size: Standard)   Pulse 60   Temp 98 1 °F (36 7 °C) (Oral)   Resp 16   Ht 5' 3" (1 6 m)   Wt 89 8 kg (198 lb)   SpO2 96%   BMI 35 07 kg/m²          Physical Exam   HENT:   Head: Normocephalic  Right Ear: External ear normal    Left Ear: External ear normal    Nose: Nose normal    Mouth/Throat: Oropharynx is clear and moist    Eyes: Pupils are equal, round, and reactive to light  Neck: No JVD present  No tracheal deviation present  No thyromegaly present  Cardiovascular: Normal rate and regular rhythm  Exam reveals no gallop and no friction rub  No murmur heard  Pulmonary/Chest: No stridor  No respiratory distress  He has no wheezes  He has no rales  He exhibits no tenderness  Abdominal: Soft  Bowel sounds are normal  He exhibits no distension  There is no tenderness  Musculoskeletal: Normal range of motion  He exhibits no edema, tenderness or deformity  Lymphadenopathy:     He has no cervical adenopathy  Skin: Skin is warm and dry  Psychiatric: He has a normal mood and affect   His behavior is normal  Thought content normal

## 2020-02-11 ENCOUNTER — APPOINTMENT (OUTPATIENT)
Dept: LAB | Facility: HOSPITAL | Age: 75
End: 2020-02-11
Payer: COMMERCIAL

## 2020-02-11 DIAGNOSIS — E78.00 PURE HYPERCHOLESTEROLEMIA: ICD-10-CM

## 2020-02-11 DIAGNOSIS — R73.9 HYPERGLYCEMIA: ICD-10-CM

## 2020-02-11 DIAGNOSIS — I10 ESSENTIAL HYPERTENSION, MALIGNANT: ICD-10-CM

## 2020-02-11 LAB
ALBUMIN SERPL BCP-MCNC: 4 G/DL (ref 3–5.2)
ALP SERPL-CCNC: 91 U/L (ref 43–122)
ALT SERPL W P-5'-P-CCNC: 68 U/L (ref 9–52)
ANION GAP SERPL CALCULATED.3IONS-SCNC: 8 MMOL/L (ref 5–14)
AST SERPL W P-5'-P-CCNC: 35 U/L (ref 17–59)
BILIRUB SERPL-MCNC: 0.6 MG/DL
BUN SERPL-MCNC: 26 MG/DL (ref 5–25)
CALCIUM SERPL-MCNC: 9.2 MG/DL (ref 8.4–10.2)
CHLORIDE SERPL-SCNC: 106 MMOL/L (ref 97–108)
CHOLEST SERPL-MCNC: 105 MG/DL
CO2 SERPL-SCNC: 27 MMOL/L (ref 22–30)
CREAT SERPL-MCNC: 1.42 MG/DL (ref 0.7–1.5)
EST. AVERAGE GLUCOSE BLD GHB EST-MCNC: 143 MG/DL
GFR SERPL CREATININE-BSD FRML MDRD: 49 ML/MIN/1.73SQ M
GLUCOSE P FAST SERPL-MCNC: 119 MG/DL (ref 70–99)
HBA1C MFR BLD: 6.6 %
HDLC SERPL-MCNC: 28 MG/DL
LDLC SERPL CALC-MCNC: 61 MG/DL
NONHDLC SERPL-MCNC: 77 MG/DL
POTASSIUM SERPL-SCNC: 4.9 MMOL/L (ref 3.6–5)
PROT SERPL-MCNC: 7 G/DL (ref 5.9–8.4)
SODIUM SERPL-SCNC: 141 MMOL/L (ref 137–147)
TRIGL SERPL-MCNC: 79 MG/DL

## 2020-02-11 PROCEDURE — 83036 HEMOGLOBIN GLYCOSYLATED A1C: CPT

## 2020-02-11 PROCEDURE — 36415 COLL VENOUS BLD VENIPUNCTURE: CPT

## 2020-02-11 PROCEDURE — 80061 LIPID PANEL: CPT

## 2020-02-11 PROCEDURE — 80053 COMPREHEN METABOLIC PANEL: CPT

## 2020-02-18 ENCOUNTER — OFFICE VISIT (OUTPATIENT)
Dept: FAMILY MEDICINE CLINIC | Facility: CLINIC | Age: 75
End: 2020-02-18
Payer: COMMERCIAL

## 2020-02-18 VITALS
RESPIRATION RATE: 18 BRPM | DIASTOLIC BLOOD PRESSURE: 88 MMHG | SYSTOLIC BLOOD PRESSURE: 138 MMHG | OXYGEN SATURATION: 97 % | TEMPERATURE: 98.6 F | HEART RATE: 65 BPM | HEIGHT: 63 IN | WEIGHT: 200 LBS | BODY MASS INDEX: 35.44 KG/M2

## 2020-02-18 DIAGNOSIS — I10 ESSENTIAL HYPERTENSION: ICD-10-CM

## 2020-02-18 DIAGNOSIS — E11.65 TYPE 2 DIABETES MELLITUS WITH HYPERGLYCEMIA, WITHOUT LONG-TERM CURRENT USE OF INSULIN (HCC): Primary | ICD-10-CM

## 2020-02-18 PROCEDURE — 82570 ASSAY OF URINE CREATININE: CPT | Performed by: NURSE PRACTITIONER

## 2020-02-18 PROCEDURE — 3075F SYST BP GE 130 - 139MM HG: CPT | Performed by: NURSE PRACTITIONER

## 2020-02-18 PROCEDURE — 4040F PNEUMOC VAC/ADMIN/RCVD: CPT | Performed by: NURSE PRACTITIONER

## 2020-02-18 PROCEDURE — 3079F DIAST BP 80-89 MM HG: CPT | Performed by: NURSE PRACTITIONER

## 2020-02-18 PROCEDURE — 82043 UR ALBUMIN QUANTITATIVE: CPT | Performed by: NURSE PRACTITIONER

## 2020-02-18 PROCEDURE — 1160F RVW MEDS BY RX/DR IN RCRD: CPT | Performed by: NURSE PRACTITIONER

## 2020-02-18 PROCEDURE — 3044F HG A1C LEVEL LT 7.0%: CPT | Performed by: NURSE PRACTITIONER

## 2020-02-18 PROCEDURE — 3066F NEPHROPATHY DOC TX: CPT | Performed by: NURSE PRACTITIONER

## 2020-02-18 PROCEDURE — 1036F TOBACCO NON-USER: CPT | Performed by: NURSE PRACTITIONER

## 2020-02-18 PROCEDURE — 99214 OFFICE O/P EST MOD 30 MIN: CPT | Performed by: NURSE PRACTITIONER

## 2020-02-18 PROCEDURE — 3008F BODY MASS INDEX DOCD: CPT | Performed by: NURSE PRACTITIONER

## 2020-02-18 NOTE — ASSESSMENT & PLAN NOTE
-Blood pressure controlled at this time  To continue on current regimen as indicated  No changes made today

## 2020-02-18 NOTE — PROGRESS NOTES
Assessment/Plan:    Type 2 diabetes mellitus with hyperglycemia, without long-term current use of insulin (HCC)    Lab Results   Component Value Date    HGBA1C 6 6 (H) 02/11/2020     Newly diagnosed diabetes, at this time we are not starting on medication  Diet modifications were recommended  Discussed with patient eating healthy meals to help control blood glucose and overall health  Also recommended weight loss to help the body use insulin better  Doing physical activity can also make the body more sensitive to insulin and help reach and maintain a healthy weight  It will also lower the levels of fat in the blood as well as reduce the risk of heart disease  Explained the importance of checking blood glucose levels and keeping a log to bring in with each visit  Keeping HbA1c in the goal range can help lower the chance of complications  A HbA1c < 7% is generally recommended  Explained the importance of the prevention of retinopathy, nephropathy, neuropathy and diabetic foot ulcers  Encouraged coming every 3 months for diabetes checks and having  yearly eye exams as well as practicing good foot care  Hypertension  -Blood pressure controlled at this time  To continue on current regimen as indicated  No changes made today  Diagnoses and all orders for this visit:    Type 2 diabetes mellitus with hyperglycemia, without long-term current use of insulin (HCC)  -     Microalbumin / creatinine urine ratio  -     Ambulatory referral to Ophthalmology; Future  -     Ambulatory referral to Podiatry; Future    Essential hypertension          Subjective:      Patient ID: Larry Quiles is a 69 y o  male  Patient presents with:  labwork followup  Diabetes      Diabetes   He presents for his initial diabetic visit  He has type 2 diabetes mellitus  No MedicAlert identification noted  His disease course has been stable  There are no hypoglycemic associated symptoms   Pertinent negatives for hypoglycemia include no headaches, hunger, speech difficulty or tremors  Pertinent negatives for diabetes include no chest pain, no fatigue, no foot paresthesias, no weakness and no weight loss  There are no hypoglycemic complications  Symptoms are stable  Diabetic complications include heart disease  Pertinent negatives for diabetic complications include no CVA, impotence, nephropathy, peripheral neuropathy, PVD or retinopathy  Risk factors for coronary artery disease include obesity, male sex and sedentary lifestyle  When asked about current treatments, none were reported  His weight is increasing steadily  He is following a generally unhealthy diet  When asked about meal planning, he reported none  He has not had a previous visit with a dietitian  He never participates in exercise  The following portions of the patient's history were reviewed and updated as appropriate: allergies, current medications, past family history, past medical history, past social history, past surgical history and problem list     Review of Systems   Constitutional: Positive for appetite change and unexpected weight change  Negative for fatigue, fever and weight loss  HENT: Negative  Eyes: Negative  Respiratory: Negative  Negative for cough, chest tightness, shortness of breath and wheezing  Cardiovascular: Negative  Negative for chest pain and palpitations  Gastrointestinal: Negative  Endocrine: Negative  Genitourinary: Negative  Negative for impotence  Musculoskeletal: Negative  Negative for arthralgias and gait problem  Skin: Negative  Allergic/Immunologic: Negative  Neurological: Negative  Negative for tremors, speech difficulty, weakness and headaches  Hematological: Negative  Negative for adenopathy  Does not bruise/bleed easily  Psychiatric/Behavioral: Negative            Objective:      /88 (BP Location: Left arm, Patient Position: Sitting, Cuff Size: Standard)   Pulse 65   Temp 98 6 °F (37 °C) (Oral) Resp 18   Ht 5' 3" (1 6 m)   Wt 90 7 kg (200 lb)   SpO2 97%   BMI 35 43 kg/m²          Physical Exam   Constitutional: He is oriented to person, place, and time  He appears well-developed and well-nourished  No distress  HENT:   Head: Normocephalic and atraumatic  Eyes: Pupils are equal, round, and reactive to light  EOM are normal    Neck: Normal range of motion  Neck supple  Cardiovascular: Normal rate, regular rhythm, normal heart sounds and intact distal pulses  Exam reveals no gallop and no friction rub  Pulses are no weak pulses  No murmur heard  Pulses:       Dorsalis pedis pulses are 2+ on the right side, and 2+ on the left side  Posterior tibial pulses are 2+ on the right side, and 2+ on the left side  Pulmonary/Chest: Effort normal and breath sounds normal  No respiratory distress  He has no wheezes  Abdominal: Soft  Bowel sounds are normal    Musculoskeletal: Normal range of motion  Feet:   Right Foot:   Skin Integrity: Positive for callus and dry skin  Negative for ulcer, skin breakdown, erythema or warmth  Left Foot:   Skin Integrity: Positive for callus and dry skin  Negative for ulcer, skin breakdown, erythema or warmth  Lymphadenopathy:     He has no cervical adenopathy  Neurological: He is alert and oriented to person, place, and time  Skin: Skin is warm and dry  Capillary refill takes less than 2 seconds  He is not diaphoretic  Psychiatric: He has a normal mood and affect  His behavior is normal  Thought content normal    Nursing note and vitals reviewed  Patient's shoes and socks removed  Right Foot/Ankle   Right Foot Inspection  Skin Exam: skin normal, dry skin, callus and callus skin not intact, no warmth, no erythema, no maceration, no abnormal color, no pre-ulcer and no ulcer                          Toe Exam: ROM and strength within normal limits  Sensory   Vibration: intact  Proprioception: intact   Monofilament testing: intact  Vascular  Capillary refills: < 3 seconds  The right DP pulse is 2+  The right PT pulse is 2+  Left Foot/Ankle  Left Foot Inspection  Skin Exam: skin normal, dry skin and callusskin not intact, no warmth, no erythema, no maceration, normal color, no pre-ulcer and no ulcer                         Toe Exam: ROM and strength within normal limits                   Sensory   Vibration: intact  Proprioception: intact  Monofilament: intact  Vascular  Capillary refills: < 3 seconds  The left DP pulse is 2+  The left PT pulse is 2+  Assign Risk Category:  No deformity present; No loss of protective sensation;  No weak pulses       Risk: 1

## 2020-02-18 NOTE — ASSESSMENT & PLAN NOTE
Lab Results   Component Value Date    HGBA1C 6 6 (H) 02/11/2020     Newly diagnosed diabetes, at this time we are not starting on medication  Diet modifications were recommended  Discussed with patient eating healthy meals to help control blood glucose and overall health  Also recommended weight loss to help the body use insulin better  Doing physical activity can also make the body more sensitive to insulin and help reach and maintain a healthy weight  It will also lower the levels of fat in the blood as well as reduce the risk of heart disease  Explained the importance of checking blood glucose levels and keeping a log to bring in with each visit  Keeping HbA1c in the goal range can help lower the chance of complications  A HbA1c < 7% is generally recommended  Explained the importance of the prevention of retinopathy, nephropathy, neuropathy and diabetic foot ulcers  Encouraged coming every 3 months for diabetes checks and having  yearly eye exams as well as practicing good foot care

## 2020-02-18 NOTE — PATIENT INSTRUCTIONS
Cuidado del pie para personas con diabetes   CUIDADO AMBULATORIO:   Lo que usted necesita saber acerca del cuidado del pie:   · El cuidado del pie ayuda a proteger stephens pies y evitar úlceras o llagas en el pie  Los [facility] de azúcar en la anthony a michaela plazo pueden dañar los vasos sanguíneos y los nervios en darlene piernas y pies  Jil daño dificulta que sienta presión, dolor, temperatura y el tacto  Es posible que usted no sienta grady cortada o grady úlcera o que los zapatos están muy apretados  El cuidado del pie es necesario para evitar problemas graves, brent grady infección o grady amputación  · La diabetes podría provocar que los dedos de darlene pies se tuerzan o se encorven [place]  Estos cambios podrían afectar la manera en que usted camina y pueden conllevar al aumento de la presión en stephens pie  La presión puede disminuir el flujo sanguíneo a darlene pies  La falta del flujo sanguíneo aumenta stephens riesgo de grady úlcera en [employer]  No ignore problemas pequeños, brent la piel reseca o heridas pequeñas  Con el tiempo, estos puede representar grady amenaza para la jose a si no se les da el cuidado apropiado  Pregúntele a stephens Jerrye Croft vitaminas y minerales son adecuados para usted  · Darlene pies se ponen entumecidos, débiles o difícil de   · Usted tiene pus drenando de grady llaga en stephens pie  · Usted tiene grady herida en stephens pie que se hace más hillary, más profunda o que no shin  · Usted nota que tiene ampollas, cortadas, rasguños, callos o llagas en stephens pie  · Usted tiene fiebre y darlene pies se ponen rojos, calientes e inflamados  · Las uñas de darlene pies se vuelven gruesas, encorvadas o [id number]  · Le es difícil revisarse los pies porque stephens visión no está neida  · Usted tiene preguntas o inquietudes acerca de stephens condición o cuidado  Cómo cuidar de darlene pies:   · Revísese los pies a diario  Observe todo el pie, incluyendo la planta del pie, entre y [employer] dedos  Revise si hay heridas y callos  Use un osman para verse la planta de los pies  La piel de los pies podría estar brillante, estirada o más obscura de lo normal  Darlene pies también podrían estar fríos y pálidos  Pase darlene shireen por encima, por debajo, por los lados y [place] dedos del pie para sentir la piel  El enrojecimiento, inflamación y calor son signos de problemas con el flujo sanguíneo que pueden conllevar a grady úlcera en el pie  No  trate de quitarse los callos usted mismo  · [employer] todos los días con agua tibia y [place]  No use [place], porque esto puede lesionarle el pie  Séquese los pies suavemente con grady toalla después de lavarlos  Seque entre y [employer] dedos  · Aplique grady loción o un humectante sobre darlene pies secos  Pregunte a stephens médico cuáles lociones son las mejores que [employer]  No  aplique loción o humectante entre darlene dedos  · [place] uñas de los pies correctamente  Lime o fiona las uñas de los pies en línea recta  Use un cepillo suave para limpiar alrededor [employer]  Si las [address] gruesas, es posible que necesite que un médico o especialista se las fiona  · Proteja darlene pies  No  camine descalzo ni use zapatos sin calcetines  Compruebe que no haya piedras u otros objetos dentro de darlene zapatos que le podrían [employer]  Use calcetines de algodón para ayudar a [place] [employer]  Use calcetines sin costura en los dedos o póngaselos con la costura hacia afuera  Cámbiese los calcetines diariamente  No use calcetines sucios ni húmedos  · Use zapatos que le calcen neida  Use zapatos que no rocen ninguna parte de darlene pies  Stephens calzado debería ser de ½ a ¾ pulgada (1 a 2 centímetros) más grandes que darlene pies  Stephens calzado también debería tener espacio adicional alrededor de la parte más ancha de darlene pies  El calzado para caminar o atlético con cordones o correas que se ajustan son los mejores  Pida ayuda a stephens médico para elegir un par de zapatos que le calcen neida  Pregúntele si debe usar algún tipo de plantilla, soporte o vendaje en jose a pies  · Asista a jose a citas de seguimiento  Johnson médico hará grady revisión a jose a pies al menos grady vez al [place]  Es posible que usted necesite hacerse un examen de pie más seguido si tiene los nervios dañados, deformidad en el pie o [place]  Él revisará si hay daño al nervio y qué tan neida usted puede sentir jose a pies  Él le revisará johnson calzado para [place] si le [employer]  Zoila grady danny de seguimiento con johnson médico o especialista de los pies brent le indiquen:  Usted va a necesitar que le revisen jose a pies al menos grady vez al [place]  Es posible que usted necesite hacerse un examen de pie más seguido si tiene los nervios dañados, deformidad en el pie o [place]  Anote jose a preguntas para que se acuerde de hacerlas josé manuel jose a visitas  © 2017 [address] Information is for End User's use only and may not be sold, redistributed or otherwise used for commercial purposes  All illustrations and images included in CareNotes® are the copyrighted property of A D A M , Inc  or Gonzalo Mcfadden  Esta información es sólo para uso en educación  Johnson intención no es darle un consejo médico sobre enfermedades o tratamientos  Colsulte con johnson Felipe Urias farmacéutico antes de seguir cualquier régimen médico para saber si es seguro y efectivo para usted  Diabetes en el adulto mayor   LO QUE NECESITA SABER:   ¿Qué necesito saber si soy un adulto mayor con diabetes? Los [facility] con diabetes están en riesgo de tener enfermedad cardíaca, derrame cerebral, enfermedad renal, ceguera y daño a los nervios   También podría estar en riesgo de alguna de las siguientes condiciones:  · Nutrición deficiente o bajos niveles de azúcar en la anthony    · Confusión o problemas con la memoria, la atención o para aprender cosas nuevas    · Problemas para controlar la orina o infecciones urinarias frecuentes    · Problemas con la coordinación o el equilibrio    · Caídas y lesiones    · Dolor    · Depresión    · Llagas abiertas en las piernas o los pies  ¿Qué es la sigla de la diabetes? La sigla [facility] significa ciertas cosas que puede hacer para controlar o evitar los problemas causados por la diabetes:  · A  representa la prueba A1c   Esta prueba muestra el promedio de la cantidad de azúcar en stephens anthony en los últimos 2 a 3 meses  Los [employer] de azúcar en la anthony pueden dañar stephens corazón, los vasos sanguíneos, los riñones, los pies y los ojos  La mayoría de los adultos mayores con diabetes debe tener un nivel de A1c inferior a 7 5  Consulte con stephens médico si sugar objetivo de valor de A1c es apropiado para usted  Stephens médico puede ayudarle a hacer cambios si stephens valor de A1c está demasiado alto  · P  representa la presión arterial   La presión arterial dimitri pueden aumentar el riesgo de un ataque cardíaco, derrame cerebral o enfermedad renal  La mayoría de los adultos mayores con diabetes debe tener grady presión arterial sistólica (primer número) de 140 y [facility] presión arterial diastólica (cristofer número) por debajo de 90  Consulte con stephens médico si estos objetivos de presión arterial son apropiados para usted  · C  representa el colesterol   Los [employer] [facility] [employer] [employer] de colesterol pueden obstruir las arterias y causar un ataque cardíaco o derrame cerebral  Consulte con stephens médico cuáles deberían ser jose a valores de colesterol  · F  representa dejar de fumar   La nicotina puede perjudicar los vasos sanguíneos e impedir que pueda controlar stephens diabetes  ¿Qué puedo hacer para controlar los factores de la sigla [facility] y prevenir los problemas causados por la diabetes? · Revise stephens nivel de azúcar en la anthony brent se le haya indicado  Stephens médico le dirá cuándo y con qué frecuencia lo debe revisar  Stephens médico también le indicará cuáles deben ser jose a niveles de azúcar en la anthony antes y después de [facility] comida   Usted puede necesitar revisar stephens orina o anthony por la presencia de cetonas, en hailey que stephens nivel esté más alto de lo recomendado  Anote jose a resultados y muéstreselos a stephens médico  Puede que éste utilice los resultados para realizar modificaciones en stephens medicación y stephens alimentación o en los horarios en que usted hace ejercicio  [place] a stephens médico más información acerca de cómo tratar un nivel de azúcar en la anthony alto o bajo  · Siga stephens plan de comidas según indicaciones  Un dietista lo ayudará a hacer un plan de comidas para mantener stephens nivel de azúcar en la anthony estable y asegurarse grady nutrición adecuada  No se salte ninguna comida  Stephens nivel de azúcar en la anthony puede bajar demasiado si usted ha tomado medicamento para la diabetes y no ha comido  Consulte con stephens médico acerca de los programas en stephens comunidad que pueden ofrecerle la entrega de comidas a domicilio  · Intente estar activo de 30 a 60 minutos reji todos los días de la [place]  La actividad física puede ayudarlo a mantener el nivel de glucosa en la anthony estable, disminuir stephens riesgo de insuficiencia cardíaca y [place] a bajar de [place]  También puede ayudarlo a mejorar stephens equilibrio y [place] riesgo de caídas  Colabore con stephens médico para elaborar un plan de ejercicios  Pídale a un familiar o amigo que brittany ejercicio con usted  Comience lentamente y ejercite de 5 a 10 minutos a la vez  Ejemplos de actividades incluyen caminar o nadar  Incluya ejercicios para fortalecer los músculos de 2 a 3 días a la semana  Incluya entrenamiento del equilibrio de 2 a 3 veces a la semana  Actividades que ayudan a aumentar el equilibrio incluyen yoga y sahil chi      · Mantenga un peso saludable  Consulte con stephens médico cuánto debería pesar  El peso saludable puede ayudarle a controlar stephens diabetes y a evitar grady enfermedad cardíaca  Solicite a stephens médico que le ayude a crear un plan para perder peso de grady forma field si usted tiene sobrepeso   Juntos podrán fijar metas de pérdida de [place] alcanzables  · No fume  Pida información a stephens médico si usted actualmente fuma y necesita ayuda para dejar de fumar  No use cigarrillos electrónicos o tabaco sin humo en vez de cigarrillos o para tratar de dejar de fumar  Todos estos aún contienen nicotina  · Controle el estrés  El estrés puede aumentar stephens nivel de azúcar en la anthony  La respiración profunda, la relajación muscular y la música pueden ayudarlo a relajarse  Solicite a stephens médico más información sobre estas prácticas  ¿Qué más puedo hacer para manejar mi diabetes? · Revise darlene pies todos los días para [place] si tienen llagas  Observe todo el pie, incluyendo la planta del pie, entre y [employer] dedos  Revise si hay heridas y callos  Use un osman para verse la planta de los pies  La piel de los pies podría estar brillante, tirante, seca o más oscura de lo normal  Darlene pies también podrían estar fríos y pálidos  Pase darlene shireen por encima, por debajo, por los lados y [facility] dedos del pie para sentir la piel  El enrojecimiento, inflamación y calor son signos de problemas con el flujo sanguíneo que pueden conllevar a grady úlcera en el pie  No trate de quitarse los callos usted mismo  · Use identificación de alerta médica  Use un brazalete o collar de alerta médica o lleve consigo grady tarjeta que indique que tiene diabetes  Pregúntele a stephens médico dónde conseguir estos artículos  · [facility] vacunas  Usted corre un mayor riesgo de presentar enfermedades graves si usted contrae la gripe, neumonía o hepatitis  Pregúntele a stephens médico si usted debe ponerse la vacuna contra la gripe, la neumonía o la hepatitis B, y cuándo debe hacerlo  · Asista a todas darlene citas  Necesitará regresar para que le realicen el examen de hemoglobina A1c cada 3 meses  Tendrá que regresar por lo menos grady vez al año para que le revisen los pies  Necesitará de un examen de la vista grady vez al año para revisar si tiene retinopatía   [place] necesitará exámenes de orina anuales para revisar si hay problemas renales  Es posible que deba realizarse exámenes para detectar enfermedad cardíaca  Anote jose a preguntas para que se acuerde de hacerlas josé manuel jose a visitas  · [place] ayuda a jose a familiares y amigos  Puede que necesite ayuda para comprobar stephens nivel de azúcar en la anthony, inyectarse la insulina o preparar las comidas  [place]dale a josea  familiareas y amigos que lo ayuden con estas tareas  Hable con stephens médico si no tiene a nadie que le ayude en stephens hogar  Un médico puede venir a stephens casa para ayudarlo con estas tareas  Llame al 911 en hailey de presentar lo siguiente:   · Usted tiene alguno de los siguientes signos de derrame cerebral:      ¨ Adormecimiento o caída de un lado de stephens andres     ¨ Debilidad en un brazo o grady pierna    ¨ Confusión o debilidad para hablar    ¨ Mareos o dolor de bruce intenso, o pérdida de la visión  · Usted tiene alguno de los siguientes signos de un ataque cardíaco:      ¨ Estornudos, presión, o dolor en stephens pecho que dura mas de 5 minutos o regresa  ¨ Malestar o dolor en stephens espalda, [place], mandíbula, abdomen, o brazo     ¨ Dificultad para respirar    ¨ Náuseas o vómito    ¨ Siente un desvanecimiento o tiene sudores fríos especialmente en el pecho o dificultad para respirar  ¿Cuándo rick buscar atención inmediata? · Usted tiene dolor abdominal intenso, o el dolor se extiende a stephens espalda  Es posible que también esté vomitando  · Usted tiene dificultad para permanecer despierto o concentrarse  · Usted está temblando o sudando  · Usted tiene visión borrosa o doble  · Stephens aliento huele a fruta o vrii  · Stephens respiración es profunda y [place], o rápida y superficial      · Stephens ritmo cardíaco es acelerado y débil  · Sufre grady caída y se lastima  ¿Cuándo rick comunicarme con mi médico?   · Tiene vómitos o diarrea       · Tiene malestar estomacal y no puede ingerir los alimentos de stephens plan de comidas  · Usted se siente débil o más cansado de lo habitual      · Usted tiene [place], jil de [place] o se irrita con facilidad  · [place] piel está debbie, tibia, seca o inflamada  · Usted tiene grady herida que no cicatriza  · Usted tiene entumecimiento en los brazos o piernas  · Usted tiene problemas para sobrellevar [place] enfermedad, o se siente ansioso o deprimido  · Usted tiene problemas con la memoria  · Usted presenta cambios en [place] visión  · Usted tiene preguntas o inquietudes acerca de [place] condición o cuidado  ACUERDOS SOBRE MCARTHUR CUIDADO:   Usted tiene el derecho de ayudar a planear m[place]arthur cuidado  Aprenda todo lo que pueda sobre [place] condición y brent darle tratamiento  Discuta jose a opciones de tratamiento con jose a médicos para decidir el cuidado que usted desea recibir  Usted siempre tiene el derecho de rechazar el tratamiento  Esta información es sólo para uso en educación  [place] intención no es darle un consejo médico sobre enfermedades o tratamientos  Colsulte con mcarthur Umang Hu farmacéutico antes de seguir cualquier régimen médico para saber si es seguro y efectivo para usted  © 2017 [address] Information is for End User's use only and may not be sold, redistributed or otherwise used for commercial purposes  All illustrations and images included in CareNotes® are the copyrighted property of A D A M , Inc  or Gonzalo Mcfadden

## 2020-02-19 LAB
CREAT UR-MCNC: 106 MG/DL
MICROALBUMIN UR-MCNC: 30.5 MG/L (ref 0–20)
MICROALBUMIN/CREAT 24H UR: 29 MG/G CREATININE (ref 0–30)

## 2020-03-09 LAB
LEFT EYE DIABETIC RETINOPATHY: NORMAL
RIGHT EYE DIABETIC RETINOPATHY: NORMAL

## 2020-03-27 ENCOUNTER — OFFICE VISIT (OUTPATIENT)
Dept: FAMILY MEDICINE CLINIC | Facility: CLINIC | Age: 75
End: 2020-03-27
Payer: COMMERCIAL

## 2020-03-27 VITALS
HEIGHT: 63 IN | TEMPERATURE: 97.9 F | WEIGHT: 195 LBS | BODY MASS INDEX: 34.55 KG/M2 | OXYGEN SATURATION: 96 % | DIASTOLIC BLOOD PRESSURE: 80 MMHG | HEART RATE: 56 BPM | RESPIRATION RATE: 16 BRPM | SYSTOLIC BLOOD PRESSURE: 130 MMHG

## 2020-03-27 DIAGNOSIS — E11.65 TYPE 2 DIABETES MELLITUS WITH HYPERGLYCEMIA, WITHOUT LONG-TERM CURRENT USE OF INSULIN (HCC): ICD-10-CM

## 2020-03-27 DIAGNOSIS — I10 ESSENTIAL HYPERTENSION: ICD-10-CM

## 2020-03-27 DIAGNOSIS — Z01.818 PREOPERATIVE CLEARANCE: Primary | ICD-10-CM

## 2020-03-27 PROCEDURE — 3060F POS MICROALBUMINURIA REV: CPT | Performed by: FAMILY MEDICINE

## 2020-03-27 PROCEDURE — 1036F TOBACCO NON-USER: CPT | Performed by: FAMILY MEDICINE

## 2020-03-27 PROCEDURE — 3075F SYST BP GE 130 - 139MM HG: CPT | Performed by: FAMILY MEDICINE

## 2020-03-27 PROCEDURE — 1160F RVW MEDS BY RX/DR IN RCRD: CPT | Performed by: FAMILY MEDICINE

## 2020-03-27 PROCEDURE — 2022F DILAT RTA XM EVC RTNOPTHY: CPT | Performed by: FAMILY MEDICINE

## 2020-03-27 PROCEDURE — 3066F NEPHROPATHY DOC TX: CPT | Performed by: FAMILY MEDICINE

## 2020-03-27 PROCEDURE — 99214 OFFICE O/P EST MOD 30 MIN: CPT | Performed by: FAMILY MEDICINE

## 2020-03-27 PROCEDURE — 3079F DIAST BP 80-89 MM HG: CPT | Performed by: FAMILY MEDICINE

## 2020-03-27 PROCEDURE — 3044F HG A1C LEVEL LT 7.0%: CPT | Performed by: FAMILY MEDICINE

## 2020-03-27 PROCEDURE — 4040F PNEUMOC VAC/ADMIN/RCVD: CPT | Performed by: FAMILY MEDICINE

## 2020-03-27 NOTE — PROGRESS NOTES
Assessment/Plan:  1  Preoperative clearance  He may hold aspirin and clopidogrel previous colonoscopy to be performed on 04/08/2020     2  Essential hypertension  Blood pressure is well control, patient will continue same treatment  Patient understands the risks associated with HTN and the need for adequate control and adherence to therapy  Explained to patient that therapeutic measure is lifelong lifestyle modification including:  Sodium reduction (<2 g/day)  Dietary Approaches to Stop Hypertension (DASH) diet (3 servings of fruit and vegetables daily, whole grains, low sodium, low-fat proteins)   Weight loss to BMI under 30 kg/m^2  Physical activity: 3 to 5 times/week of daily aerobic exercise for 30- to 50-minute sessions as tolerated   Avoid alcohol consumption  CAD is stable  Recently seen by cardiologist, Dr Zuleima Valentine  3  Type 2 diabetes mellitus with hyperglycemia, without long-term current use of insulin [facility] )    He presents for his follow-up diabetic visit  he has type 2 diabetes mellitus  his disease course has been stable  Pertinent negatives for diabetes include no blurred vision, no chest pain, no fatigue, no foot paresthesias, no foot ulcerations, no polydipsia, no polyphagia, no polyuria, no visual change, no weakness and no weight loss  Pertinent negatives for hypoglycemia complications include no blackouts and no hospitalization  Symptoms are stable  Pertinent negatives for diabetic complications include no autonomic neuropathy   Current diabetic treatment includes diet and exercise, he is not taking anti diabetic meds  No problem-specific Assessment & Plan notes found for this encounter  There are no diagnoses linked to this encounter  BMI Counseling: Body mass index is 34 54 kg/m²  The BMI is above normal  Exercise recommendations include exercising 3-5 times per week  Subjective:      Patient ID: Bianca Thompson is a 77 y o  male  He is having colonoscopy on 4/08/202    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 and is taking Plavis and aspirin  Diabetes   Pertinent negatives for hypoglycemia include no confusion, dizziness, headaches or pallor  Pertinent negatives for diabetes include no chest pain, no fatigue, no polydipsia and no polyphagia  Hypertension   Pertinent negatives include no chest pain, headaches or palpitations  The following portions of the patient's history were reviewed and updated as appropriate: allergies, current medications, past family history, past medical history, past social history, past surgical history and problem list     Review of Systems   Constitutional: Negative for appetite change, diaphoresis, fatigue and fever  HENT: Negative for congestion, dental problem, drooling and ear discharge  Eyes: Negative for pain, discharge, redness and itching  Respiratory: Negative for apnea, cough, choking and chest tightness  Cardiovascular: Negative for chest pain, palpitations and leg swelling  Gastrointestinal: Negative for abdominal distention, abdominal pain, anal bleeding and blood in stool  Endocrine: Negative for cold intolerance, heat intolerance, polydipsia and polyphagia  Genitourinary: Negative for difficulty urinating, dysuria, enuresis and flank pain  Musculoskeletal: Negative for arthralgias, back pain, gait problem and joint swelling  Skin: Negative for color change, pallor, rash and wound  Allergic/Immunologic: Negative for environmental allergies, food allergies and immunocompromised state  Neurological: Negative for dizziness, facial asymmetry, light-headedness and headaches  Hematological: Negative for adenopathy  Does not bruise/bleed easily  Psychiatric/Behavioral: Negative for agitation, behavioral problems, confusion and decreased concentration           Objective:      /80 (BP Location: Left arm, Patient Position: Sitting, Cuff Size: Standard)   Pulse 56   Temp 97 9 °F (36 6 °C) (Oral)   Resp 16 Ht 5' 3" (1 6 m)   Wt 88 5 kg (195 lb)   SpO2 96%   BMI 34 54 kg/m²          Physical Exam   HENT:   Head: Normocephalic  Right Ear: External ear normal    Left Ear: External ear normal    Nose: Nose normal    Mouth/Throat: Oropharynx is clear and moist    Eyes: Pupils are equal, round, and reactive to light  Neck: No JVD present  No tracheal deviation present  No thyromegaly present  Cardiovascular: Normal rate and regular rhythm  Exam reveals no gallop and no friction rub  No murmur heard  Pulmonary/Chest: No stridor  No respiratory distress  He has no wheezes  He has no rales  He exhibits no tenderness  Abdominal: Soft  Bowel sounds are normal  He exhibits no distension  There is no tenderness  Musculoskeletal: Normal range of motion  He exhibits no edema, tenderness or deformity  Lymphadenopathy:     He has no cervical adenopathy  Skin: Skin is warm and dry  Psychiatric: He has a normal mood and affect   His behavior is normal  Thought content normal

## 2020-04-13 ENCOUNTER — TELEPHONE (OUTPATIENT)
Dept: ADMINISTRATIVE | Facility: OTHER | Age: 75
End: 2020-04-13

## 2020-07-13 ENCOUNTER — OFFICE VISIT (OUTPATIENT)
Dept: FAMILY MEDICINE CLINIC | Facility: CLINIC | Age: 75
End: 2020-07-13
Payer: COMMERCIAL

## 2020-07-13 VITALS
BODY MASS INDEX: 35.08 KG/M2 | WEIGHT: 198 LBS | DIASTOLIC BLOOD PRESSURE: 80 MMHG | SYSTOLIC BLOOD PRESSURE: 146 MMHG | RESPIRATION RATE: 16 BRPM | HEIGHT: 63 IN | HEART RATE: 64 BPM | OXYGEN SATURATION: 96 % | TEMPERATURE: 97.8 F

## 2020-07-13 DIAGNOSIS — N19 RENAL FAILURE, UNSPECIFIED CHRONICITY: ICD-10-CM

## 2020-07-13 DIAGNOSIS — N40.0 BENIGN PROSTATIC HYPERPLASIA WITHOUT LOWER URINARY TRACT SYMPTOMS: ICD-10-CM

## 2020-07-13 DIAGNOSIS — E11.65 TYPE 2 DIABETES MELLITUS WITH HYPERGLYCEMIA, WITHOUT LONG-TERM CURRENT USE OF INSULIN (HCC): ICD-10-CM

## 2020-07-13 DIAGNOSIS — I10 ESSENTIAL HYPERTENSION: ICD-10-CM

## 2020-07-13 DIAGNOSIS — E78.00 PURE HYPERCHOLESTEROLEMIA: ICD-10-CM

## 2020-07-13 DIAGNOSIS — I25.10 CORONARY ARTERY DISEASE INVOLVING NATIVE CORONARY ARTERY OF NATIVE HEART WITHOUT ANGINA PECTORIS: ICD-10-CM

## 2020-07-13 DIAGNOSIS — Z00.01 ENCOUNTER FOR GENERAL ADULT MEDICAL EXAMINATION WITH ABNORMAL FINDINGS: Primary | ICD-10-CM

## 2020-07-13 DIAGNOSIS — Z12.5 PROSTATE CANCER SCREENING: ICD-10-CM

## 2020-07-13 PROBLEM — E87.5 HYPERKALEMIA: Status: RESOLVED | Noted: 2019-11-10 | Resolved: 2020-07-13

## 2020-07-13 PROCEDURE — 3077F SYST BP >= 140 MM HG: CPT | Performed by: FAMILY MEDICINE

## 2020-07-13 PROCEDURE — G0439 PPPS, SUBSEQ VISIT: HCPCS | Performed by: FAMILY MEDICINE

## 2020-07-13 PROCEDURE — 1036F TOBACCO NON-USER: CPT | Performed by: FAMILY MEDICINE

## 2020-07-13 PROCEDURE — 99214 OFFICE O/P EST MOD 30 MIN: CPT | Performed by: FAMILY MEDICINE

## 2020-07-13 PROCEDURE — 4010F ACE/ARB THERAPY RXD/TAKEN: CPT | Performed by: FAMILY MEDICINE

## 2020-07-13 PROCEDURE — 2022F DILAT RTA XM EVC RTNOPTHY: CPT | Performed by: FAMILY MEDICINE

## 2020-07-13 PROCEDURE — 4040F PNEUMOC VAC/ADMIN/RCVD: CPT | Performed by: FAMILY MEDICINE

## 2020-07-13 PROCEDURE — 3079F DIAST BP 80-89 MM HG: CPT | Performed by: FAMILY MEDICINE

## 2020-07-13 PROCEDURE — 3060F POS MICROALBUMINURIA REV: CPT | Performed by: FAMILY MEDICINE

## 2020-07-13 PROCEDURE — 1160F RVW MEDS BY RX/DR IN RCRD: CPT | Performed by: FAMILY MEDICINE

## 2020-07-13 PROCEDURE — 1125F AMNT PAIN NOTED PAIN PRSNT: CPT | Performed by: FAMILY MEDICINE

## 2020-07-13 PROCEDURE — 3066F NEPHROPATHY DOC TX: CPT | Performed by: FAMILY MEDICINE

## 2020-07-13 PROCEDURE — 3044F HG A1C LEVEL LT 7.0%: CPT | Performed by: FAMILY MEDICINE

## 2020-07-13 PROCEDURE — 1170F FXNL STATUS ASSESSED: CPT | Performed by: FAMILY MEDICINE

## 2020-07-13 PROCEDURE — 3008F BODY MASS INDEX DOCD: CPT | Performed by: FAMILY MEDICINE

## 2020-07-13 RX ORDER — OLMESARTAN MEDOXOMIL AND HYDROCHLOROTHIAZIDE 40/12.5 40; 12.5 MG/1; MG/1
1 TABLET ORAL DAILY
Qty: 90 TABLET | Refills: 3 | Status: SHIPPED | OUTPATIENT
Start: 2020-07-13 | End: 2021-09-27 | Stop reason: SDUPTHER

## 2020-07-13 RX ORDER — TAMSULOSIN HYDROCHLORIDE 0.4 MG/1
0.4 CAPSULE ORAL EVERY 24 HOURS
Qty: 90 CAPSULE | Refills: 3 | Status: SHIPPED | OUTPATIENT
Start: 2020-07-13 | End: 2021-08-19 | Stop reason: SDUPTHER

## 2020-07-13 RX ORDER — ATORVASTATIN CALCIUM 80 MG/1
80 TABLET, FILM COATED ORAL EVERY 24 HOURS
Qty: 90 TABLET | Refills: 3 | Status: SHIPPED | OUTPATIENT
Start: 2020-07-13 | End: 2021-07-27

## 2020-07-13 RX ORDER — ASPIRIN 81 MG/1
81 TABLET, CHEWABLE ORAL EVERY 24 HOURS
Qty: 90 TABLET | Refills: 3 | Status: SHIPPED | OUTPATIENT
Start: 2020-07-13 | End: 2021-09-30 | Stop reason: SDUPTHER

## 2020-07-13 RX ORDER — EZETIMIBE 10 MG/1
10 TABLET ORAL DAILY
Qty: 90 TABLET | Refills: 3 | Status: SHIPPED | OUTPATIENT
Start: 2020-07-13 | End: 2021-07-01

## 2020-07-13 RX ORDER — METOPROLOL SUCCINATE 100 MG/1
100 TABLET, EXTENDED RELEASE ORAL EVERY 24 HOURS
Qty: 90 TABLET | Refills: 3 | Status: SHIPPED | OUTPATIENT
Start: 2020-07-13 | End: 2021-07-27

## 2020-07-13 RX ORDER — CLOPIDOGREL BISULFATE 75 MG/1
75 TABLET ORAL EVERY 24 HOURS
Qty: 90 TABLET | Refills: 3 | Status: SHIPPED | OUTPATIENT
Start: 2020-07-13 | End: 2021-09-27 | Stop reason: SDUPTHER

## 2020-07-13 NOTE — ASSESSMENT & PLAN NOTE
In February 2020 GFR was 49 in November 2019 he has GFR of 60  I am unsure if this is an acute renal failure the recover or patient enter in a chronic kidney disease face  Checking labs again and we will decide further workup

## 2020-07-13 NOTE — PROGRESS NOTES
Assessment/Plan:    Encounter for general adult medical examination with abnormal findings  Patient has history of CABG  His blood pressure today is elevated  He is not exercising as recommended  Today I am recommended him to exercise 30 minutes every day to decrease the option to get an extra medication or increase dose of current medications  I will follow him up in three month if blood pressure still remains elevated we will reassess his current antihypertensive agents  He is an ex-smoker quit more than 20 years ago  He has no signs of lung cancer  After explaining the risk and benefits of PSA patient decide to get a blood work done  I explained to him this is the last test a 75 years all  The risk of cancer of the certify is higher but the risk of death from cancer is very low  The most important point in this visit is to ensure patient is compliance with the therapy:  Statin therapy and anti hypertensive therapy  Type 2 diabetes mellitus with hyperglycemia, without long-term current use of insulin (HCC)    Lab Results   Component Value Date    HGBA1C 6 6 (H) 02/11/2020    Rechecking diabetes, patient states better compliant with diet  He is exercise is not the best goals  We discussed about pains of current labs we will change current therapy  Renal failure  In February 2020 GFR was 49 in November 2019 he has GFR of 60  I am unsure if this is an acute renal failure the recover or patient enter in a chronic kidney disease face  Checking labs again and we will decide further workup  Hypertension  Explained to patient the one of the best option for his chronic conditions is exercise  Requested to do it 30 minutes treadmill every day  If blood pressure persists elevated at the next appointment we will reassess his treatment  CAD (coronary artery disease)  Is stable, continue same treatment  The goal is to keep LDL lower than 70 and blood pressure below 130/80  Diagnoses and all orders for this visit:    Encounter for general adult medical examination with abnormal findings    Essential hypertension  -     CBC and differential; Future  -     Comprehensive metabolic panel; Future  -     olmesartan-hydrochlorothiazide (BENICAR HCT) 40-12 5 MG per tablet; Take 1 tablet by mouth daily  -     metoprolol succinate (TOPROL-XL) 100 mg 24 hr tablet; Take 1 tablet (100 mg total) by mouth every 24 hours  -     atorvastatin (LIPITOR) 80 mg tablet; Take 1 tablet (80 mg total) by mouth every 24 hours    Type 2 diabetes mellitus with hyperglycemia, without long-term current use of insulin (HCC)  -     HEMOGLOBIN A1C W/ EAG ESTIMATION; Future    Pure hypercholesterolemia  -     Lipid panel; Future  -     ezetimibe (ZETIA) 10 mg tablet; Take 1 tablet (10 mg total) by mouth daily    Coronary artery disease involving native coronary artery of native heart without angina pectoris  -     clopidogrel (Plavix) 75 mg tablet; Take 1 tablet (75 mg total) by mouth every 24 hours  -     aspirin 81 mg chewable tablet; Chew 1 tablet (81 mg total) every 24 hours    Benign prostatic hyperplasia without lower urinary tract symptoms  -     tamsulosin (Flomax) 0 4 mg; Take 1 capsule (0 4 mg total) by mouth every 24 hours    Prostate cancer screening  -     PSA Total, Diagnostic; Future    Renal failure, unspecified chronicity          Subjective:      Patient ID: William Patel is a 77 y o  male  Patient is here to follow-up coronary artery disease status post CABG, hypertension, BPH  He states is asymptomatic  He does not have chest pain or shortness of breath  He denies having edema lower extremities  His urination improved under tamsulosin  He is exercising when he feels like  Labs from February 2020 reported renal failure with GFR of 49      Lab Results       Component                Value               Date/Time                  HGBA1C                   6 6 (H)             02/11/2020 09:31 AM The following portions of the patient's history were reviewed and updated as appropriate: allergies, current medications, past family history, past medical history, past social history, past surgical history and problem list     Review of Systems   Constitutional: Negative for diaphoresis, fatigue, fever and unexpected weight change  Respiratory: Negative for apnea, cough, choking, chest tightness and shortness of breath  Cardiovascular: Negative for chest pain, palpitations and leg swelling  Gastrointestinal: Negative for abdominal distention, abdominal pain, anal bleeding, blood in stool and constipation  Musculoskeletal: Negative for arthralgias, back pain, gait problem and joint swelling  Neurological: Negative for dizziness, facial asymmetry, light-headedness and headaches  Psychiatric/Behavioral: Negative for behavioral problems, dysphoric mood and self-injury  The patient is not nervous/anxious  Objective:      /80 (BP Location: Left arm, Patient Position: Sitting, Cuff Size: Standard)   Pulse 64   Temp 97 8 °F (36 6 °C) (Temporal)   Resp 16   Ht 5' 3" (1 6 m)   Wt 89 8 kg (198 lb)   SpO2 96%   BMI 35 07 kg/m²          Physical Exam   Constitutional: He is oriented to person, place, and time  Neck: No JVD present  No tracheal tenderness present  Carotid bruit is not present  No neck rigidity  No edema present  No thyroid mass and no thyromegaly present  Cardiovascular: Normal rate, regular rhythm, normal heart sounds and normal pulses  No extrasystoles are present  Exam reveals no distant heart sounds and no friction rub  Pulmonary/Chest: Effort normal and breath sounds normal  No stridor  No apnea, no tachypnea and no bradypnea  Abdominal: Soft  Bowel sounds are normal  He exhibits no abdominal bruit  There is no hepatosplenomegaly, splenomegaly or hepatomegaly  There is no CVA tenderness  No hernia  Musculoskeletal: Normal range of motion  Neurological: He is oriented to person, place, and time  He has normal reflexes  Skin: Skin is warm and dry  Psychiatric: He has a normal mood and affect  His behavior is normal  Judgment and thought content normal    Nursing note and vitals reviewed

## 2020-07-13 NOTE — ASSESSMENT & PLAN NOTE
Explained to patient the one of the best option for his chronic conditions is exercise  Requested to do it 30 minutes treadmill every day  If blood pressure persists elevated at the next appointment we will reassess his treatment

## 2020-07-13 NOTE — PROGRESS NOTES
Assessment and Plan:     Problem List Items Addressed This Visit        Endocrine    Type 2 diabetes mellitus with hyperglycemia, without long-term current use of insulin (HCC)    Relevant Orders    HEMOGLOBIN A1C W/ EAG ESTIMATION       Cardiovascular and Mediastinum    CAD (coronary artery disease)    Relevant Medications    metoprolol succinate (TOPROL-XL) 100 mg 24 hr tablet    clopidogrel (Plavix) 75 mg tablet    aspirin 81 mg chewable tablet    Hypertension    Relevant Medications    olmesartan-hydrochlorothiazide (BENICAR HCT) 40-12 5 MG per tablet    metoprolol succinate (TOPROL-XL) 100 mg 24 hr tablet    atorvastatin (LIPITOR) 80 mg tablet    Other Relevant Orders    CBC and differential    Comprehensive metabolic panel       Other    Hyperlipidemia    Relevant Medications    atorvastatin (LIPITOR) 80 mg tablet    ezetimibe (ZETIA) 10 mg tablet    Other Relevant Orders    Lipid panel    Encounter for general adult medical examination with abnormal findings - Primary     Patient has history of CABG  His blood pressure today is elevated  He is not exercising as recommended  Today I am recommended him to exercise 30 minutes every day to decrease the option to get an extra medication or increase dose of current medications  I will follow him up in three month if blood pressure still remains elevated we will reassess his current antihypertensive agents  He is an ex-smoker quit more than 20 years ago  He has no signs of lung cancer  After explaining the risk and benefits of PSA patient decide to get a blood work done  I explained to him this is the last test a 75 years all  The risk of cancer of the certify is higher but the risk of death from cancer is very low  The most important point in this visit is to ensure patient is compliance with the therapy:  Statin therapy and anti hypertensive therapy                 Other Visit Diagnoses     Benign prostatic hyperplasia without lower urinary tract symptoms        Relevant Medications    tamsulosin (Flomax) 0 4 mg    Prostate cancer screening        Relevant Orders    PSA Total, Diagnostic           Preventive health issues were discussed with patient, and age appropriate screening tests were ordered as noted in patient's After Visit Summary  Personalized health advice and appropriate referrals for health education or preventive services given if needed, as noted in patient's After Visit Summary       History of Present Illness:     Patient presents for Medicare Annual Wellness visit    Patient Care Team:  Selwyn Booth MD as PCP - General (Family Medicine)     Problem List:     Patient Active Problem List   Diagnosis    CAD (coronary artery disease)    Diverticulosis    History of erectile dysfunction    History of tobacco use    Hypertension    Hyperlipidemia    Old myocardial infarction    S/P CABG x 3    Renal failure    Hyperglycemia    Hyperkalemia    Sinus arrhythmia    Type 2 diabetes mellitus with hyperglycemia, without long-term current use of insulin ([facility] )    Encounter for general adult medical examination with abnormal findings      Past Medical and Surgical History:     Past Medical History:   Diagnosis Date    CAD (coronary artery disease)     Diabetes mellitus ([facility] )     Diverticulosis     Hyperglycemia     Hyperlipidemia     Hypertension     Obesity      Past Surgical History:   Procedure Laterality Date    CARDIOVASCULAR STRESS TEST  03/20/2013    COLONOSCOPY      onset: 9/21/9    COLONOSCOPY W/ POLYPECTOMY      2/6/17:colon,polyp      CORONARY ARTERY BYPASS GRAFT  01/21/2016    x3      Family History:     Family History   Problem Relation Age of Onset    Hypertension Mother       Social History:        Social History     Socioeconomic History    Marital status: /Civil Union     Spouse name: None    Number of children: None    Years of education: None    Highest education level: None   Occupational History  None   Social Needs    Financial resource strain: None    Food insecurity:     Worry: None     Inability: None    Transportation needs:     Medical: None     Non-medical: None   Tobacco Use    Smoking status: Former Smoker     Types: Cigarettes     Last attempt to quit: 2012     Years since quittin 5    Smokeless tobacco: Never Used   Substance and Sexual Activity    Alcohol use: Yes     Frequency: 2-4 times a month     Drinks per session: 1 or 2     Binge frequency: Never    Drug use: No    Sexual activity: Yes     Partners: Female   Lifestyle    Physical activity:     Days per week: None     Minutes per session: None    Stress: None   Relationships    Social connections:     Talks on phone: None     Gets together: None     Attends Gnosticism service: None     Active member of club or organization: None     Attends meetings of clubs or organizations: None     Relationship status: None    Intimate partner violence:     Fear of current or ex partner: None     Emotionally abused: None     Physically abused: None     Forced sexual activity: None   Other Topics Concern    None   Social History Narrative    None      Medications and Allergies:     Current Outpatient Medications   Medication Sig Dispense Refill    aspirin 81 mg chewable tablet Chew 1 tablet (81 mg total) every 24 hours 90 tablet 3    atorvastatin (LIPITOR) 80 mg tablet Take 1 tablet (80 mg total) by mouth every 24 hours 90 tablet 3    clopidogrel (Plavix) 75 mg tablet Take 1 tablet (75 mg total) by mouth every 24 hours 90 tablet 3    ezetimibe (ZETIA) 10 mg tablet Take 1 tablet (10 mg total) by mouth daily 90 tablet 3    metoprolol succinate (TOPROL-XL) 100 mg 24 hr tablet Take 1 tablet (100 mg total) by mouth every 24 hours 90 tablet 3    olmesartan-hydrochlorothiazide (BENICAR HCT) 40-12 5 MG per tablet Take 1 tablet by mouth daily 90 tablet 3    tamsulosin (Flomax) 0 4 mg Take 1 capsule (0 4 mg total) by mouth every 24 hours 90 capsule 3     No current facility-administered medications for this visit  Allergies   Allergen Reactions    No Active Allergies       Immunizations:     Immunization History   Administered Date(s) Administered    H1N1, All Formulations 10/22/2009    INFLUENZA 10/14/2015, 10/14/2015, 11/09/2018    Influenza Split 10/30/2013    Influenza TIV (IM) 09/21/2011, 09/25/2012, 10/23/2014    Influenza, high dose seasonal 0 5 mL 11/09/2018, 11/04/2019    Pneumococcal Conjugate 13-Valent 07/29/2019    Pneumococcal Polysaccharide PPV23 02/02/2012, 04/19/2016    Tdap 04/19/2016    Zoster 05/14/2012      Health Maintenance:         Topic Date Due    CRC Screening: Colonoscopy  04/08/2023    Hepatitis C Screening  Completed     There are no preventive care reminders to display for this patient  Medicare Health Risk Assessment:     /80 (BP Location: Left arm, Patient Position: Sitting, Cuff Size: Standard)   Pulse 64   Temp 97 8 °F (36 6 °C) (Temporal)   Resp 16   Ht 5' 3" (1 6 m)   Wt 89 8 kg (198 lb)   SpO2 96%   BMI 35 07 kg/m²      Neel is here for his Subsequent Wellness visit  Health Risk Assessment:   Patient rates overall health as good  Patient feels that their physical health rating is same  Eyesight was rated as same  Hearing was rated as slightly worse  Patient feels that their emotional and mental health rating is same  Pain experienced in the last 7 days has been none  Patient states that he has experienced no weight loss or gain in last 6 months  Fall Risk Screening: In the past year, patient has experienced: no history of falling in past year      Home Safety:  Patient does not have trouble with stairs inside or outside of their home  Patient has working smoke alarms and has working carbon monoxide detector  Home safety hazards include: none  Nutrition:   Current diet is Regular  Medications:   Patient is currently taking over-the-counter supplements   OTC medications include: see medication list  Patient is able to manage medications  Activities of Daily Living (ADLs)/Instrumental Activities of Daily Living (IADLs):   Walk and transfer into and out of bed and chair?: Yes  Dress and groom yourself?: Yes    Bathe or shower yourself?: Yes    Feed yourself?  Yes  Do your laundry/housekeeping?: Yes  Manage your money, pay your bills and track your expenses?: Yes  Make your own meals?: No    Do your own shopping?: Yes    Previous Hospitalizations:   Any hospitalizations or ED visits within the last 12 months?: No      Advance Care Planning:   Living will: Yes    Durable POA for healthcare: No    Advanced directive: Yes    Advanced directive counseling given: Yes    Five wishes given: No    Patient declined ACP directive: No    End of Life Decisions reviewed with patient: Yes    Provider agrees with end of life decisions: Yes      Cognitive Screening:   Provider or family/friend/caregiver concerned regarding cognition?: No    PREVENTIVE SCREENINGS      Cardiovascular Screening:    General: Screening Not Indicated    Due for: Lipid Panel, Cholesterol, Triglycerides and Lipoprotein      Diabetes Screening:     General: Screening Not Indicated and History Diabetes      Colorectal Cancer Screening:     General: Screening Current      Prostate Cancer Screening:    General: Risks and Benefits Discussed    Due for: PSA      Osteoporosis Screening:    General: Screening Not Indicated      Abdominal Aortic Aneurysm (AAA) Screening:    Risk factors include: age between 73-67 yo and tobacco use        General: Screening Current      Lung Cancer Screening:     General: Screening Not Indicated      Hepatitis C Screening:    General: Screening Current      Preventive Screening Comments: Recommended to exercise 30 minutes every day and his treadmill at three km per hour    Other Counseling Topics:   Alcohol use counseling, car/seat belt/driving safety, skin self-exam, sunscreen and calcium and vitamin D intake and regular weightbearing exercise         Selwyn Booth MD

## 2020-07-13 NOTE — ASSESSMENT & PLAN NOTE
Patient has history of CABG  His blood pressure today is elevated  He is not exercising as recommended  Today I am recommended him to exercise 30 minutes every day to decrease the option to get an extra medication or increase dose of current medications  I will follow him up in three month if blood pressure still remains elevated we will reassess his current antihypertensive agents  He is an ex-smoker quit more than 20 years ago  He has no signs of lung cancer  After explaining the risk and benefits of PSA patient decide to get a blood work done  I explained to him this is the last test a 75 years all  The risk of cancer of the certify is higher but the risk of death from cancer is very low  The most important point in this visit is to ensure patient is compliance with the therapy:  Statin therapy and anti hypertensive therapy

## 2020-07-13 NOTE — PATIENT INSTRUCTIONS

## 2020-07-13 NOTE — ASSESSMENT & PLAN NOTE
Lab Results   Component Value Date    HGBA1C 6 6 (H) 02/11/2020    Rechecking diabetes, patient states better compliant with diet  He is exercise is not the best goals  We discussed about pains of current labs we will change current therapy

## 2020-07-13 NOTE — ASSESSMENT & PLAN NOTE
Is stable, continue same treatment  The goal is to keep LDL lower than 70 and blood pressure below 130/80

## 2020-07-16 ENCOUNTER — LAB (OUTPATIENT)
Dept: LAB | Facility: HOSPITAL | Age: 75
End: 2020-07-16
Payer: COMMERCIAL

## 2020-07-16 DIAGNOSIS — E11.65 TYPE 2 DIABETES MELLITUS WITH HYPERGLYCEMIA, WITHOUT LONG-TERM CURRENT USE OF INSULIN (HCC): ICD-10-CM

## 2020-07-16 DIAGNOSIS — I10 ESSENTIAL HYPERTENSION: ICD-10-CM

## 2020-07-16 DIAGNOSIS — E78.00 PURE HYPERCHOLESTEROLEMIA: ICD-10-CM

## 2020-07-16 DIAGNOSIS — Z12.5 PROSTATE CANCER SCREENING: ICD-10-CM

## 2020-07-16 LAB
ALBUMIN SERPL BCP-MCNC: 3.9 G/DL (ref 3–5.2)
ALP SERPL-CCNC: 137 U/L (ref 43–122)
ALT SERPL W P-5'-P-CCNC: 70 U/L (ref 9–52)
ANION GAP SERPL CALCULATED.3IONS-SCNC: 6 MMOL/L (ref 5–14)
AST SERPL W P-5'-P-CCNC: 38 U/L (ref 17–59)
BASOPHILS # BLD AUTO: 0 THOUSANDS/ΜL (ref 0–0.1)
BASOPHILS NFR BLD AUTO: 1 % (ref 0–1)
BILIRUB SERPL-MCNC: 0.4 MG/DL
BUN SERPL-MCNC: 22 MG/DL (ref 5–25)
CALCIUM SERPL-MCNC: 8.5 MG/DL (ref 8.4–10.2)
CHLORIDE SERPL-SCNC: 108 MMOL/L (ref 97–108)
CHOLEST SERPL-MCNC: 104 MG/DL
CO2 SERPL-SCNC: 23 MMOL/L (ref 22–30)
CREAT SERPL-MCNC: 1.27 MG/DL (ref 0.7–1.5)
EOSINOPHIL # BLD AUTO: 0.2 THOUSAND/ΜL (ref 0–0.4)
EOSINOPHIL NFR BLD AUTO: 3 % (ref 0–6)
ERYTHROCYTE [DISTWIDTH] IN BLOOD BY AUTOMATED COUNT: 13.9 %
EST. AVERAGE GLUCOSE BLD GHB EST-MCNC: 137 MG/DL
GFR SERPL CREATININE-BSD FRML MDRD: 55 ML/MIN/1.73SQ M
GLUCOSE P FAST SERPL-MCNC: 109 MG/DL (ref 70–99)
HBA1C MFR BLD: 6.4 %
HCT VFR BLD AUTO: 43.7 % (ref 41–53)
HDLC SERPL-MCNC: 31 MG/DL
HGB BLD-MCNC: 14.9 G/DL (ref 13.5–17.5)
LDLC SERPL CALC-MCNC: 45 MG/DL
LYMPHOCYTES # BLD AUTO: 1.2 THOUSANDS/ΜL (ref 0.5–4)
LYMPHOCYTES NFR BLD AUTO: 19 % (ref 25–45)
MCH RBC QN AUTO: 32.1 PG (ref 26–34)
MCHC RBC AUTO-ENTMCNC: 34.2 G/DL (ref 31–36)
MCV RBC AUTO: 94 FL (ref 80–100)
MONOCYTES # BLD AUTO: 0.7 THOUSAND/ΜL (ref 0.2–0.9)
MONOCYTES NFR BLD AUTO: 11 % (ref 1–10)
NEUTROPHILS # BLD AUTO: 4.2 THOUSANDS/ΜL (ref 1.8–7.8)
NEUTS SEG NFR BLD AUTO: 67 % (ref 45–65)
NONHDLC SERPL-MCNC: 73 MG/DL
PLATELET # BLD AUTO: 140 THOUSANDS/UL (ref 150–450)
PMV BLD AUTO: 10.1 FL (ref 8.9–12.7)
POTASSIUM SERPL-SCNC: 4 MMOL/L (ref 3.6–5)
PROT SERPL-MCNC: 6.7 G/DL (ref 5.9–8.4)
PSA SERPL-MCNC: 2.6 NG/ML (ref 0–4)
RBC # BLD AUTO: 4.65 MILLION/UL (ref 4.5–5.9)
SODIUM SERPL-SCNC: 137 MMOL/L (ref 137–147)
TRIGL SERPL-MCNC: 141 MG/DL
WBC # BLD AUTO: 6.3 THOUSAND/UL (ref 4.5–11)

## 2020-07-16 PROCEDURE — 80053 COMPREHEN METABOLIC PANEL: CPT

## 2020-07-16 PROCEDURE — 80061 LIPID PANEL: CPT

## 2020-07-16 PROCEDURE — 85025 COMPLETE CBC W/AUTO DIFF WBC: CPT

## 2020-07-16 PROCEDURE — 83036 HEMOGLOBIN GLYCOSYLATED A1C: CPT

## 2020-07-16 PROCEDURE — 84153 ASSAY OF PSA TOTAL: CPT

## 2020-07-16 PROCEDURE — 36415 COLL VENOUS BLD VENIPUNCTURE: CPT

## 2020-08-04 ENCOUNTER — TELEPHONE (OUTPATIENT)
Dept: FAMILY MEDICINE CLINIC | Facility: CLINIC | Age: 75
End: 2020-08-04

## 2020-08-04 NOTE — TELEPHONE ENCOUNTER
----- Message from Dawna Lewis MD sent at 8/4/2020  4:01 AM EDT -----  Please call patient Labs are in expected limits keep same treatment

## 2020-09-25 ENCOUNTER — OFFICE VISIT (OUTPATIENT)
Dept: FAMILY MEDICINE CLINIC | Facility: CLINIC | Age: 75
End: 2020-09-25
Payer: COMMERCIAL

## 2020-09-25 VITALS — TEMPERATURE: 97.9 F

## 2020-09-25 DIAGNOSIS — Z23 NEEDS FLU SHOT: Primary | ICD-10-CM

## 2020-09-25 PROCEDURE — 90662 IIV NO PRSV INCREASED AG IM: CPT

## 2020-09-25 PROCEDURE — G0008 ADMIN INFLUENZA VIRUS VAC: HCPCS

## 2020-10-19 ENCOUNTER — OFFICE VISIT (OUTPATIENT)
Dept: FAMILY MEDICINE CLINIC | Facility: CLINIC | Age: 75
End: 2020-10-19
Payer: COMMERCIAL

## 2020-10-19 VITALS
DIASTOLIC BLOOD PRESSURE: 70 MMHG | BODY MASS INDEX: 35.26 KG/M2 | TEMPERATURE: 97.8 F | WEIGHT: 199 LBS | OXYGEN SATURATION: 97 % | SYSTOLIC BLOOD PRESSURE: 130 MMHG | RESPIRATION RATE: 16 BRPM | HEIGHT: 63 IN | HEART RATE: 70 BPM

## 2020-10-19 DIAGNOSIS — R73.03 PREDIABETES: Primary | ICD-10-CM

## 2020-10-19 DIAGNOSIS — I10 ESSENTIAL HYPERTENSION: ICD-10-CM

## 2020-10-19 DIAGNOSIS — I25.10 CORONARY ARTERY DISEASE INVOLVING NATIVE CORONARY ARTERY OF NATIVE HEART WITHOUT ANGINA PECTORIS: ICD-10-CM

## 2020-10-19 PROBLEM — E11.65 TYPE 2 DIABETES MELLITUS WITH HYPERGLYCEMIA, WITHOUT LONG-TERM CURRENT USE OF INSULIN (HCC): Status: RESOLVED | Noted: 2020-02-18 | Resolved: 2020-10-19

## 2020-10-19 PROCEDURE — 99214 OFFICE O/P EST MOD 30 MIN: CPT | Performed by: FAMILY MEDICINE

## 2020-11-02 ENCOUNTER — OFFICE VISIT (OUTPATIENT)
Dept: CARDIOLOGY CLINIC | Facility: CLINIC | Age: 75
End: 2020-11-02
Payer: COMMERCIAL

## 2020-11-02 VITALS
HEIGHT: 63 IN | DIASTOLIC BLOOD PRESSURE: 80 MMHG | WEIGHT: 195.8 LBS | SYSTOLIC BLOOD PRESSURE: 130 MMHG | BODY MASS INDEX: 34.69 KG/M2 | TEMPERATURE: 97 F | HEART RATE: 51 BPM

## 2020-11-02 DIAGNOSIS — I25.2 OLD MYOCARDIAL INFARCTION: ICD-10-CM

## 2020-11-02 DIAGNOSIS — R73.03 PREDIABETES: ICD-10-CM

## 2020-11-02 DIAGNOSIS — Z95.1 S/P CABG X 3: ICD-10-CM

## 2020-11-02 DIAGNOSIS — E78.00 PURE HYPERCHOLESTEROLEMIA: ICD-10-CM

## 2020-11-02 DIAGNOSIS — I25.10 CORONARY ARTERY DISEASE INVOLVING NATIVE CORONARY ARTERY OF NATIVE HEART WITHOUT ANGINA PECTORIS: Primary | ICD-10-CM

## 2020-11-02 DIAGNOSIS — I10 ESSENTIAL HYPERTENSION: ICD-10-CM

## 2020-11-02 PROCEDURE — 93000 ELECTROCARDIOGRAM COMPLETE: CPT | Performed by: INTERNAL MEDICINE

## 2020-11-02 PROCEDURE — 99214 OFFICE O/P EST MOD 30 MIN: CPT | Performed by: INTERNAL MEDICINE

## 2020-11-02 RX ORDER — NITROGLYCERIN 0.4 MG/1
0.4 TABLET SUBLINGUAL
Qty: 25 TABLET | Refills: 2 | Status: SHIPPED | OUTPATIENT
Start: 2020-11-02 | End: 2021-09-30 | Stop reason: SDUPTHER

## 2021-01-19 ENCOUNTER — OFFICE VISIT (OUTPATIENT)
Dept: FAMILY MEDICINE CLINIC | Facility: CLINIC | Age: 76
End: 2021-01-19
Payer: COMMERCIAL

## 2021-01-19 VITALS
DIASTOLIC BLOOD PRESSURE: 70 MMHG | HEIGHT: 63 IN | HEART RATE: 64 BPM | RESPIRATION RATE: 16 BRPM | SYSTOLIC BLOOD PRESSURE: 130 MMHG | OXYGEN SATURATION: 97 % | WEIGHT: 198 LBS | TEMPERATURE: 98 F | BODY MASS INDEX: 35.08 KG/M2

## 2021-01-19 DIAGNOSIS — E11.65 TYPE 2 DIABETES MELLITUS WITH HYPERGLYCEMIA, WITHOUT LONG-TERM CURRENT USE OF INSULIN (HCC): ICD-10-CM

## 2021-01-19 DIAGNOSIS — E78.00 PURE HYPERCHOLESTEROLEMIA: Primary | ICD-10-CM

## 2021-01-19 DIAGNOSIS — I10 ESSENTIAL HYPERTENSION: ICD-10-CM

## 2021-01-19 LAB — SL AMB POCT HEMOGLOBIN AIC: 6.8 (ref ?–6.5)

## 2021-01-19 PROCEDURE — 83036 HEMOGLOBIN GLYCOSYLATED A1C: CPT | Performed by: FAMILY MEDICINE

## 2021-01-19 PROCEDURE — 99214 OFFICE O/P EST MOD 30 MIN: CPT | Performed by: FAMILY MEDICINE

## 2021-01-19 RX ORDER — METFORMIN HYDROCHLORIDE 750 MG/1
750 TABLET, EXTENDED RELEASE ORAL
Qty: 90 TABLET | Refills: 3 | Status: SHIPPED | OUTPATIENT
Start: 2021-01-19 | End: 2021-09-07 | Stop reason: SDUPTHER

## 2021-01-19 NOTE — PROGRESS NOTES
Assessment/Plan:  Again poor control of modifying factors  Blood pressure is well control, patient will continue same treatment  Patient understands the risks associated with HTN and the need for adequate control and adherence to therapy  Explained to patient that therapeutic measure is lifelong lifestyle modification including:  Sodium reduction (<2 g/day)  Dietary Approaches to Stop Hypertension (DASH) diet (3 servings of fruit and vegetables daily, whole grains, low sodium, low-fat proteins)   Weight loss to BMI under 30 kg/m^2  Physical activity: 3 to 5 times/week of daily aerobic exercise for 30- to 50-minute sessions as tolerated   Avoid alcohol consumption  I explained to Neel the goals of blood sugar levels , with fasting  of 130 or less and  2 hrs after meals of 150 or less  Education given verbally and with written materials  Change His life style modification again stressed  The vascular complications secondary to diabetes poor control were explained with details  The renal function protection and the prevention of major vascular disease with the use of  ARB's and statins respectively and exercise/diet was also discussed  No problem-specific Assessment & Plan notes found for this encounter  Diagnoses and all orders for this visit:    Pure hypercholesterolemia  -     Lipid panel; Future    Essential hypertension  -     Comprehensive metabolic panel; Future    BMI 35 0-35 9,adult    Type 2 diabetes mellitus with hyperglycemia, without long-term current use of insulin (HCC)  -     POCT hemoglobin A1c    Other orders  -     Cancel: POCT hemoglobin A1c          Subjective:      Patient ID: Catrina Frances is a 77 y o  male  Patient is here to follow Diabetes and HTN, patient states good compliance with treatment  Denies chest pain, shortness of breath, angina, urinary problems  No exercise but follows low salt diet      He has a history of CABG 5 years ago, He does not exercise, but "has a machiene" at home  Past 5 years he promises to control weight and exercise but never did either  "Pandemia is to blame"    Patient is here to follow Diabetes and HTN, patient states good compliance with treatment  Denies chest pain, shortness of breath, angina, urinary problems  No exercise but follows low salt diet  History of CAD in 2016,   The following portions of the patient's history were reviewed and updated as appropriate: allergies, current medications, past family history, past medical history, past social history, past surgical history and problem list     Review of Systems   Constitutional: Negative for diaphoresis, fatigue, fever and unexpected weight change  Respiratory: Negative for apnea, cough, choking, chest tightness and shortness of breath  Cardiovascular: Negative for chest pain, palpitations and leg swelling  Gastrointestinal: Negative for abdominal distention, abdominal pain, anal bleeding, blood in stool and constipation  Musculoskeletal: Negative for arthralgias, back pain, gait problem and joint swelling  Neurological: Negative for dizziness, facial asymmetry, light-headedness and headaches  Psychiatric/Behavioral: Negative for behavioral problems, dysphoric mood and self-injury  The patient is not nervous/anxious  Objective:      /70 (BP Location: Left arm, Patient Position: Sitting, Cuff Size: Standard)   Pulse 64   Temp 98 °F (36 7 °C) (Temporal)   Resp 16   Ht 5' 3" (1 6 m)   Wt 89 8 kg (198 lb)   SpO2 97%   BMI 35 07 kg/m²          Physical Exam  Vitals signs and nursing note reviewed  Neck:      Musculoskeletal: No edema or neck rigidity  Thyroid: No thyroid mass or thyromegaly  Vascular: No carotid bruit or JVD  Trachea: No tracheal tenderness  Cardiovascular:      Rate and Rhythm: Normal rate and regular rhythm  No extrasystoles are present  Pulses: Normal pulses  Heart sounds: Normal heart sounds   Heart sounds not distant  No friction rub  Pulmonary:      Effort: Pulmonary effort is normal  No tachypnea or bradypnea  Breath sounds: Normal breath sounds  No stridor  Abdominal:      General: Bowel sounds are normal  There is no abdominal bruit  Palpations: Abdomen is soft  There is no hepatomegaly or splenomegaly  Hernia: No hernia is present  Comments: Abdomen circunference is 60 inches   Musculoskeletal: Normal range of motion  Skin:     General: Skin is warm and dry  Neurological:      Mental Status: He is oriented to person, place, and time  Deep Tendon Reflexes: Reflexes are normal and symmetric  Psychiatric:         Behavior: Behavior normal          Thought Content: Thought content normal          Judgment: Judgment normal          BMI Counseling: Body mass index is 35 07 kg/m²  The BMI is above normal  Exercise recommendations include exercising 3-5 times per week

## 2021-02-04 ENCOUNTER — LAB (OUTPATIENT)
Dept: LAB | Facility: HOSPITAL | Age: 76
End: 2021-02-04
Payer: COMMERCIAL

## 2021-02-04 DIAGNOSIS — I10 ESSENTIAL HYPERTENSION: ICD-10-CM

## 2021-02-04 DIAGNOSIS — E78.00 PURE HYPERCHOLESTEROLEMIA: ICD-10-CM

## 2021-02-04 LAB
ALBUMIN SERPL BCP-MCNC: 4 G/DL (ref 3–5.2)
ALP SERPL-CCNC: 106 U/L (ref 43–122)
ALT SERPL W P-5'-P-CCNC: 79 U/L (ref 9–52)
ANION GAP SERPL CALCULATED.3IONS-SCNC: 6 MMOL/L (ref 5–14)
AST SERPL W P-5'-P-CCNC: 47 U/L (ref 17–59)
BILIRUB SERPL-MCNC: 0.8 MG/DL
BUN SERPL-MCNC: 23 MG/DL (ref 5–25)
CALCIUM SERPL-MCNC: 9 MG/DL (ref 8.4–10.2)
CHLORIDE SERPL-SCNC: 108 MMOL/L (ref 97–108)
CHOLEST SERPL-MCNC: 90 MG/DL
CO2 SERPL-SCNC: 29 MMOL/L (ref 22–30)
CREAT SERPL-MCNC: 1.5 MG/DL (ref 0.7–1.5)
GFR SERPL CREATININE-BSD FRML MDRD: 45 ML/MIN/1.73SQ M
GLUCOSE P FAST SERPL-MCNC: 103 MG/DL (ref 70–99)
HDLC SERPL-MCNC: 25 MG/DL
LDLC SERPL CALC-MCNC: 51 MG/DL
NONHDLC SERPL-MCNC: 65 MG/DL
POTASSIUM SERPL-SCNC: 4.2 MMOL/L (ref 3.6–5)
PROT SERPL-MCNC: 6.8 G/DL (ref 5.9–8.4)
SODIUM SERPL-SCNC: 143 MMOL/L (ref 137–147)
TRIGL SERPL-MCNC: 72 MG/DL

## 2021-02-04 PROCEDURE — 36415 COLL VENOUS BLD VENIPUNCTURE: CPT

## 2021-02-04 PROCEDURE — 80061 LIPID PANEL: CPT

## 2021-02-04 PROCEDURE — 80053 COMPREHEN METABOLIC PANEL: CPT

## 2021-02-11 NOTE — RESULT ENCOUNTER NOTE
His kidney function is decreasing, rest labs are in target, please call patient needs to follow up with Nephrologist

## 2021-05-03 ENCOUNTER — OFFICE VISIT (OUTPATIENT)
Dept: FAMILY MEDICINE CLINIC | Facility: CLINIC | Age: 76
End: 2021-05-03
Payer: COMMERCIAL

## 2021-05-03 VITALS
BODY MASS INDEX: 35.61 KG/M2 | DIASTOLIC BLOOD PRESSURE: 70 MMHG | HEART RATE: 60 BPM | OXYGEN SATURATION: 94 % | WEIGHT: 201 LBS | HEIGHT: 63 IN | SYSTOLIC BLOOD PRESSURE: 110 MMHG | TEMPERATURE: 97.9 F | RESPIRATION RATE: 16 BRPM

## 2021-05-03 DIAGNOSIS — N18.31 CKD STAGE G3A/A2, GFR 45-59 AND ALBUMIN CREATININE RATIO 30-299 MG/G (HCC): ICD-10-CM

## 2021-05-03 DIAGNOSIS — E11.65 TYPE 2 DIABETES MELLITUS WITH HYPERGLYCEMIA, WITHOUT LONG-TERM CURRENT USE OF INSULIN (HCC): Primary | ICD-10-CM

## 2021-05-03 DIAGNOSIS — D69.6 THROMBOCYTOPENIA (HCC): ICD-10-CM

## 2021-05-03 DIAGNOSIS — R05.9 COUGH: ICD-10-CM

## 2021-05-03 DIAGNOSIS — B34.9 VIRAL INFECTION, UNSPECIFIED: ICD-10-CM

## 2021-05-03 DIAGNOSIS — I10 ESSENTIAL HYPERTENSION: ICD-10-CM

## 2021-05-03 DIAGNOSIS — E66.01 OBESITY, MORBID (HCC): ICD-10-CM

## 2021-05-03 PROBLEM — I49.8 SINUS ARRHYTHMIA: Status: RESOLVED | Noted: 2019-11-10 | Resolved: 2021-05-03

## 2021-05-03 PROBLEM — Z00.01 ENCOUNTER FOR GENERAL ADULT MEDICAL EXAMINATION WITH ABNORMAL FINDINGS: Status: RESOLVED | Noted: 2020-07-13 | Resolved: 2021-05-03

## 2021-05-03 PROBLEM — N19 RENAL FAILURE: Status: RESOLVED | Noted: 2019-07-29 | Resolved: 2021-05-03

## 2021-05-03 PROBLEM — R73.9 HYPERGLYCEMIA: Status: RESOLVED | Noted: 2019-07-29 | Resolved: 2021-05-03

## 2021-05-03 PROBLEM — R73.03 PREDIABETES: Status: RESOLVED | Noted: 2020-10-19 | Resolved: 2021-05-03

## 2021-05-03 LAB
CREAT UR-MCNC: 103 MG/DL
MICROALBUMIN UR-MCNC: 12.3 MG/L (ref 0–20)
MICROALBUMIN/CREAT 24H UR: 12 MG/G CREATININE (ref 0–30)
SL AMB POCT HEMOGLOBIN AIC: 6.8 (ref ?–6.5)

## 2021-05-03 PROCEDURE — 99215 OFFICE O/P EST HI 40 MIN: CPT | Performed by: FAMILY MEDICINE

## 2021-05-03 PROCEDURE — 82570 ASSAY OF URINE CREATININE: CPT | Performed by: FAMILY MEDICINE

## 2021-05-03 PROCEDURE — 83036 HEMOGLOBIN GLYCOSYLATED A1C: CPT | Performed by: FAMILY MEDICINE

## 2021-05-03 PROCEDURE — 82043 UR ALBUMIN QUANTITATIVE: CPT | Performed by: FAMILY MEDICINE

## 2021-05-03 PROCEDURE — U0003 INFECTIOUS AGENT DETECTION BY NUCLEIC ACID (DNA OR RNA); SEVERE ACUTE RESPIRATORY SYNDROME CORONAVIRUS 2 (SARS-COV-2) (CORONAVIRUS DISEASE [COVID-19]), AMPLIFIED PROBE TECHNIQUE, MAKING USE OF HIGH THROUGHPUT TECHNOLOGIES AS DESCRIBED BY CMS-2020-01-R: HCPCS | Performed by: FAMILY MEDICINE

## 2021-05-03 PROCEDURE — U0005 INFEC AGEN DETEC AMPLI PROBE: HCPCS | Performed by: FAMILY MEDICINE

## 2021-05-03 NOTE — PROGRESS NOTES
Assessment/Plan:    CKD stage G3a/A2, GFR 45-59 and albumin creatinine ratio  mg/g ([facility])  Lab Results   Component Value Date    EGFR 45 (L) 02/04/2021    EGFR 55 (L) 07/16/2020    EGFR 49 (L) 02/11/2020    CREATININE 1 50 02/04/2021    CREATININE 1 27 07/16/2020    CREATININE 1 42 02/11/2020    I had long discussion with the patient regarding the goal to prevent more damage is to control diabetes it and control hypertension  We discussed about decrease the carbs in diet to improve glycemia control  Type 2 diabetes mellitus with hyperglycemia, without long-term current use of insulin ([facility])    Lab Results   Component Value Date    HGBA1C 6 8 (A) 01/19/2021     His hemoglobin A1c is on target, however patient is not following more strict diet  To reduce the amount of carbs in diet mainly from rise  To exercise more at least 3 times per week  He does not do any day  Hypertension    Hypertension well controlled continue the same, did stress the importance of weight control, exercise and following low-salt diet  Thrombocytopenia ([facility])    Repeat CBC  Diagnoses and all orders for this visit:    Type 2 diabetes mellitus with hyperglycemia, without long-term current use of insulin ([facility])  -     POCT hemoglobin A1c  -     Comprehensive metabolic panel; Future  -     Microalbumin / creatinine urine ratio    Thrombocytopenia ([facility])  -     CBC and differential; Future    Obesity, morbid (HCC)    CKD stage G3a/A2, GFR 45-59 and albumin creatinine ratio  mg/g ([facility])    Essential hypertension    Cough    Viral infection, unspecified  -     Novel Coron[facility]Covid-19),[facility] - Collected in Office          Subjective:      Patient ID: Marcial Vidal is a 77 y o  male  Patient is here to follow-up diabetes and chronic kidney disease  He is status post CABG  He is coughing frequently today and is blaming this to the weather  He had complete to series of COVID-19 vaccine    He has history also thrombocytopenia  The following portions of the patient's history were reviewed and updated as appropriate: allergies, current medications, past family history, past medical history, past social history, past surgical history and problem list     Review of Systems   Constitutional: Negative for diaphoresis, fatigue, fever and unexpected weight change  Respiratory: Positive for cough and chest tightness  Negative for apnea, choking and shortness of breath  Cardiovascular: Negative for chest pain, palpitations and leg swelling  Gastrointestinal: Negative for abdominal distention, abdominal pain, anal bleeding, blood in stool and constipation  Musculoskeletal: Negative for arthralgias, back pain, gait problem and joint swelling  Neurological: Negative for dizziness, facial asymmetry, light-headedness and headaches  Psychiatric/Behavioral: Negative for behavioral problems, dysphoric mood and self-injury  The patient is not nervous/anxious  Objective:      /70 (BP Location: Left arm, Patient Position: Sitting, Cuff Size: Standard)   Pulse 60   Temp 97 9 °F (36 6 °C) (Temporal)   Resp 16   Ht 5' 3" (1 6 m)   Wt 91 2 kg (201 lb)   SpO2 94%   BMI 35 61 kg/m²          Physical Exam  Vitals signs and nursing note reviewed  Constitutional:       Appearance: Normal appearance  Neck:      Musculoskeletal: No edema or neck rigidity  Thyroid: No thyroid mass or thyromegaly  Vascular: No carotid bruit or JVD  Trachea: No tracheal tenderness  Cardiovascular:      Rate and Rhythm: Normal rate and regular rhythm  No extrasystoles are present  Pulses: Normal pulses  no weak pulses          Dorsalis pedis pulses are 2+ on the right side and 2+ on the left side  Posterior tibial pulses are 2+ on the right side and 2+ on the left side  Heart sounds: Normal heart sounds  Heart sounds not distant  No friction rub     Pulmonary:      Effort: Pulmonary effort is normal  No tachypnea or bradypnea  Breath sounds: No stridor  Decreased breath sounds (constant coughing) present  Abdominal:      General: Bowel sounds are normal  There is no abdominal bruit  Palpations: Abdomen is soft  There is no hepatomegaly or splenomegaly  Hernia: No hernia is present  Musculoskeletal: Normal range of motion  Feet:    Feet:      Right foot:      Skin integrity: No ulcer, skin breakdown, erythema, warmth, callus or dry skin  Left foot:      Skin integrity: No ulcer, skin breakdown, erythema, warmth, callus or dry skin  Skin:     General: Skin is warm and dry  Neurological:      General: No focal deficit present  Mental Status: He is alert and oriented to person, place, and time  Deep Tendon Reflexes: Reflexes are normal and symmetric  Psychiatric:         Behavior: Behavior normal          Thought Content: Thought content normal          Judgment: Judgment normal        Patient's shoes and socks removed  Right Foot/Ankle   Right Foot Inspection  Skin Exam: skin normal and skin intact no dry skin, no warmth, no callus, no erythema, no maceration, no abnormal color, no pre-ulcer, no ulcer and no callus                          Toe Exam: ROM and strength within normal limitsno swelling, no tenderness, erythema and  no right toe deformity  Sensory   Vibration: intact  Proprioception: intact   Monofilament testing: intact  Vascular  Capillary refills: < 3 seconds  The right DP pulse is 2+  The right PT pulse is 2+       Left Foot/Ankle  Left Foot Inspection  Skin Exam: skin normal and skin intactno dry skin, no warmth, no erythema, no maceration, normal color, no pre-ulcer, no ulcer and no callus                         Toe Exam: ROM and strength within normal limitsno swelling, no tenderness, no erythema and no left toe deformity                   Sensory   Vibration: intact  Proprioception: intact  Monofilament: intact  Vascular  Capillary refills: < 3 seconds  The left DP pulse is 2+  The left PT pulse is 2+  Assign Risk Category:  No deformity present; No loss of protective sensation;  No weak pulses       Risk: 1

## 2021-05-03 NOTE — ASSESSMENT & PLAN NOTE
Lab Results   Component Value Date    HGBA1C 6 8 (A) 01/19/2021     His hemoglobin A1c is on target, however patient is not following more strict diet  To reduce the amount of carbs in diet mainly from rise  To exercise more at least 3 times per week  He does not do any day

## 2021-05-03 NOTE — ASSESSMENT & PLAN NOTE
Hypertension well controlled continue the same, did stress the importance of weight control, exercise and following low-salt diet

## 2021-05-03 NOTE — ASSESSMENT & PLAN NOTE
Lab Results   Component Value Date    EGFR 45 (L) 02/04/2021    EGFR 55 (L) 07/16/2020    EGFR 49 (L) 02/11/2020    CREATININE 1 50 02/04/2021    CREATININE 1 27 07/16/2020    CREATININE 1 42 02/11/2020    I had long discussion with the patient regarding the goal to prevent more damage is to control diabetes it and control hypertension  We discussed about decrease the carbs in diet to improve glycemia control

## 2021-05-04 ENCOUNTER — OFFICE VISIT (OUTPATIENT)
Dept: CARDIOLOGY CLINIC | Facility: CLINIC | Age: 76
End: 2021-05-04
Payer: COMMERCIAL

## 2021-05-04 VITALS
BODY MASS INDEX: 36 KG/M2 | SYSTOLIC BLOOD PRESSURE: 126 MMHG | HEART RATE: 63 BPM | WEIGHT: 203.2 LBS | DIASTOLIC BLOOD PRESSURE: 76 MMHG

## 2021-05-04 DIAGNOSIS — I10 ESSENTIAL HYPERTENSION: ICD-10-CM

## 2021-05-04 DIAGNOSIS — I25.10 CORONARY ARTERY DISEASE INVOLVING NATIVE CORONARY ARTERY OF NATIVE HEART WITHOUT ANGINA PECTORIS: Primary | ICD-10-CM

## 2021-05-04 DIAGNOSIS — Z95.1 S/P CABG X 3: ICD-10-CM

## 2021-05-04 DIAGNOSIS — E78.00 PURE HYPERCHOLESTEROLEMIA: ICD-10-CM

## 2021-05-04 DIAGNOSIS — I25.2 OLD MYOCARDIAL INFARCTION: ICD-10-CM

## 2021-05-04 DIAGNOSIS — E11.65 TYPE 2 DIABETES MELLITUS WITH HYPERGLYCEMIA, WITHOUT LONG-TERM CURRENT USE OF INSULIN (HCC): ICD-10-CM

## 2021-05-04 DIAGNOSIS — D69.6 THROMBOCYTOPENIA (HCC): ICD-10-CM

## 2021-05-04 DIAGNOSIS — N18.31 CKD STAGE G3A/A2, GFR 45-59 AND ALBUMIN CREATININE RATIO 30-299 MG/G (HCC): ICD-10-CM

## 2021-05-04 LAB — SARS-COV-2 RNA RESP QL NAA+PROBE: NEGATIVE

## 2021-05-04 PROCEDURE — 99214 OFFICE O/P EST MOD 30 MIN: CPT | Performed by: INTERNAL MEDICINE

## 2021-05-04 NOTE — PROGRESS NOTES
Cardiology Follow Up    [facility]  1945  [id number]  [address] [facility] [place] [address] [phone]    1  Coronary artery disease involving native coronary artery of native heart without angina pectoris     2  Pure hypercholesterolemia     3  Old myocardial infarction     4  BMI 35 0-35 9,adult     5  CKD stage G3a/A2, GFR 45-59 and albumin creatinine ratio  mg/g ([facility])     6  Essential hypertension     7  S/P CABG x 3  2016     8  Thrombocytopenia ([address] )     9  Type 2 diabetes mellitus with hyperglycemia, without long-term current use of insulin [facility])         Patient was 1st seen in our office on March 5, 2013 for midsternal chest discomfort and dyspnea on exertion  His electrocardiogram demonstrated old inferior wall myocardial infarction with possible lateral extension  Other diagnoses include hypertension, metabolic syndrome, hyperlipidemia  Cardiac catheterization demonstrated a 70 80% left anterior after the 2nd diagonal branch  Circumflex artery minor luminal irregularities  Right coronary artery dominant in distribution with a proximal 90% lesion  Patient underwent coronary artery bypass surgery with a LIMA to the LAD, SVG to the right coronary artery, SVG to the OM branch of the circumflex  03/19/2013 echocardiogram: Normal left ventricular systolic and diastolic function  Mildly elevated left ventricular filling pressures  Aortic sclerosis with mild aortic regurgitation  Moderately severe concentric left ventricular hypertrophy  07/15/2019 lipid profile: Total cholesterol 105, triglycerides 62, HDL 34, LDL calculated 59, non HDL cholesterol 71    7/16/2020 Lipid Profile: Total Cholesterol 104, Triglycerides 141, HDL 31, LDL Calculated 45, Non HDL Cholesterol 73    02/04/2021 lipid profile: Cholesterol 90, triglycerides 72, HDL 25, LDL calculated 51, non HDL cholesterol 65      Interval History: Patient's blood pressure is excellent 126/76  His cholesterol is excellent with an LDL of 51 and a non HDL cholesterol 65  He has no cardiac complaints  He is trying to lose weight but finding it very difficult  Patient denies chest discomfort or shortness of breath  Patient has no palpitations  Patient denies symptoms of dizziness, lightheadedness or near-syncope/syncope  Patient denies leg edema  Patient denies symptoms of orthopnea or paroxysmal nocturnal dyspnea        Discussion/Summary:  Return in 6 months    Patient Active Problem List   Diagnosis    CAD (coronary artery disease)    Diverticulosis    Hypertension    Hyperlipidemia    Old myocardial infarction    S/P CABG x 3  2016    Thrombocytopenia ([facility])    Type 2 diabetes mellitus with hyperglycemia, without long-term current use of insulin ([facility])    BMI 35 0-35 9,adult    CKD stage G3a/A2, GFR 45-59 and albumin creatinine ratio  mg/g ([facility])     Past Medical History:   Diagnosis Date    CAD (coronary artery disease)     Coronary artery disease     [facility]     Diverticulosis     Hyperglycemia     Hyperlipidemia     Hypertension     Obesity      Social History     Socioeconomic History    Marital status: /Civil Union     Spouse name: Not on file    Number of children: Not on file    Years of education: Not on file    Highest education level: Not on file   Occupational History    Not on file   Social Needs    Financial resource strain: Not on file    Food insecurity     Worry: Not on file     Inability: Not on file   [employer] needs     Medical: Not on file     Non-medical: Not on file   Tobacco Use    Smoking status: Former Smoker     Types: Cigarettes     Quit date: 2012     Years since quittin 3    Smokeless tobacco: Never Used   Substance and Sexual Activity    Alcohol use: Yes     Frequency: 2-4 times a month     Drinks per session: 1 or 2     Binge frequency: Never    Drug use: No    Sexual activity: Yes     Partners: Female   Lifestyle    Physical activity     Days per week: Not on file     Minutes per session: Not on file    Stress: Not on file   Relationships    Social connections     Talks on phone: Not on file     Gets together: Not on file     Attends Christianity service: Not on file     Active member of club or organization: Not on file     Attends meetings of clubs or organizations: Not on file     Relationship status: Not on file    Intimate partner violence     Fear of current or ex partner: Not on file     Emotionally abused: Not on file     Physically abused: Not on file     Forced sexual activity: Not on file   Other Topics Concern    Not on file   Social History Narrative    Not on file      Family History   Problem Relation Age of Onset    Hypertension Mother      Past Surgical History:   Procedure Laterality Date    CARDIOVASCULAR STRESS TEST  03/20/2013    COLONOSCOPY      4/8/2020:diverticulosis    COLONOSCOPY W/ POLYPECTOMY      2/6/17:colon,polyp      CORONARY ARTERY BYPASS GRAFT  01/21/2016    x3       Current Outpatient Medications:     aspirin 81 mg chewable tablet, Chew 1 tablet (81 mg total) every 24 hours, Disp: 90 tablet, Rfl: 3    atorvastatin (LIPITOR) 80 mg tablet, Take 1 tablet (80 mg total) by mouth every 24 hours, Disp: 90 tablet, Rfl: 3    clopidogrel (Plavix) 75 mg tablet, Take 1 tablet (75 mg total) by mouth every 24 hours, Disp: 90 tablet, Rfl: 3    ezetimibe (ZETIA) 10 mg tablet, Take 1 tablet (10 mg total) by mouth daily, Disp: 90 tablet, Rfl: 3    metFORMIN (GLUCOPHAGE-XR) 750 mg 24 hr tablet, Take 1 tablet (750 mg total) by mouth daily with breakfast, Disp: 90 tablet, Rfl: 3    metoprolol succinate (TOPROL-XL) 100 mg 24 hr tablet, Take 1 tablet (100 mg total) by mouth every 24 hours, Disp: 90 tablet, Rfl: 3    nitroglycerin (NITROSTAT) 0 4 mg SL tablet, Place 1 tablet (0 4 mg total) under the tongue every 5 (five) minutes as needed for chest pain, Disp: 25 tablet, Rfl: 2    olmesartan-hydrochlorothiazide (BENICAR HCT) 40-12 5 MG per tablet, Take 1 tablet by mouth daily, Disp: 90 tablet, Rfl: 3    tamsulosin (Flomax) 0 4 mg, Take 1 capsule (0 4 mg total) by mouth every 24 hours, Disp: 90 capsule, Rfl: 3  Allergies   Allergen Reactions    No Active Allergies        No results found for this visit on 05/04/21  Review of Systems:  Review of Systems   Constitutional: Negative for activity change  Respiratory: Negative for cough, chest tightness, shortness of breath and wheezing  Cardiovascular: Negative for chest pain, palpitations and leg swelling  Musculoskeletal: Negative for gait problem  Skin: Negative for color change  Neurological: Negative for dizziness, tremors, syncope, weakness, light-headedness and headaches  Psychiatric/Behavioral: Negative for agitation and confusion  Physical Exam:  /76 (BP Location: Right arm, Patient Position: Sitting, Cuff Size: Large)   Pulse 63   Wt 92 2 kg (203 lb 3 2 oz)   BMI 36 00 kg/m²   Physical Exam  Vitals signs reviewed  Constitutional:       General: He is not in acute distress  Appearance: He is well-developed  HENT:      Head: Normocephalic and atraumatic  Neck:      Vascular: No carotid bruit or JVD  Cardiovascular:      Rate and Rhythm: Normal rate and regular rhythm  Heart sounds: Normal heart sounds  No murmur  No friction rub  No gallop  Pulmonary:      Effort: Pulmonary effort is normal  No respiratory distress  Breath sounds: Normal breath sounds  No wheezing, rhonchi or rales  Chest:      Chest wall: No tenderness  Abdominal:      General: Bowel sounds are normal  There is no distension  Palpations: Abdomen is soft  Musculoskeletal:      Right lower leg: No edema  Left lower leg: No edema  Skin:     General: Skin is warm and dry  Neurological:      General: No focal deficit present        Mental Status: He is alert and oriented to person, place, and time  Mental status is at baseline  Psychiatric:         Mood and Affect: Mood normal          Behavior: Behavior normal          Thought Content: Thought content normal          Judgment: Judgment normal          --------------------------------------------------------------------------------  ECHO:   Results for orders placed during the hospital encounter of 10/04/18   Echo complete with contrast if indicated    Narrative [employer]  [facility], [address] [place] [address]    Transthoracic Echocardiogram  2D, M-mode, Doppler, and Color Doppler    Study date:  04-Oct-2018    Patient: Jens Wallace  MR number: [id number]  Account number: [de-identified]  : 1945  Age: 68 years  Gender: Male  Status: Outpatient  Location: [facility]  Height: 63 in  Weight: 189 6 lb  BP: 124/ 72 mmHg    Indications: Hypertension CAD Shortness of breath  Diagnoses: I10  - Essential (primary) hypertension    Sonographer:  Shruthi Macias RDCS  Primary Physician:  Dee Harvey MD  Referring Physician:  Dayna Camarena MD  Group:  [facility]  Interpreting Physician:  Dayna Camarena MD    SUMMARY    SUMMARY:  1  Sinus rhythm with premature ventricular contractions  2  Good technical quality  3  Normal left ventricular systolic function, EF 01%  4  Grade 3 diastolic left ventricular dysfunction  5  Moderate concentric left ventricular hypertrophy  6  Mild biatrial enlargement  7  Aortic sclerosis without stenosis  LEFT VENTRICLE:  Normal left ventricular systolic function, EF 35%  Normal left ventricular cavity size  Moderate concentric left ventricular hypertrophy  Normal left ventricular wall motion without regional wall motion abnormalities  Type 3 left  ventricular diastolic dysfunction with mildly elevated left ventricular filling pressures      RIGHT VENTRICLE:  Normal right ventricular systolic function and cavitary size     LEFT ATRIUM:  Mild left atrial enlargement  RIGHT ATRIUM:  Mild right atrial enlargement  MITRAL VALVE:  Trivial mitral regurgitation  AORTIC VALVE:  Tricuspid aortic valve with aortic sclerosis  Mild aortic regurgitation  PULMONIC VALVE:  Trace pulmonic regurgitation  AORTA:  Borderline aortic root enlargement at 2 8 cm  PULMONARY ARTERIES:  Normal pulmonary artery systolic pressures  PERICARDIUM:  No pericardial effusion  HISTORY: PRIOR HISTORY: Risk factors: hypertension and morbid obesity  PRIOR PROCEDURES: Coronary bypass  PROCEDURE: The study was performed in the [address] [place] [address]  This was a routine study  The transthoracic approach was used  The study included complete 2D imaging, M-mode, complete spectral Doppler, and color Doppler  The heart rate was 62  bpm, at the start of the study  This was a technically difficult study  LEFT VENTRICLE: Normal left ventricular systolic function, EF 19%  Normal left ventricular cavity size  Moderate concentric left ventricular hypertrophy  Normal left ventricular wall motion without regional wall motion abnormalities  Type  3 left ventricular diastolic dysfunction with mildly elevated left ventricular filling pressures  RIGHT VENTRICLE: Normal right ventricular systolic function and cavitary size  LEFT ATRIUM: Mild left atrial enlargement  RIGHT ATRIUM: Mild right atrial enlargement  MITRAL VALVE: Trivial mitral regurgitation  AORTIC VALVE: Tricuspid aortic valve with aortic sclerosis  Mild aortic regurgitation  PULMONIC VALVE: Trace pulmonic regurgitation  PERICARDIUM: No pericardial effusion  There was no pericardial effusion  The pericardium was normal in appearance  AORTA: Borderline aortic root enlargement at 2 8 cm  PULMONARY ARTERY: Normal pulmonary artery systolic pressures      SYSTEM MEASUREMENT TABLES    2D  %FS: 32 19 %  Ao Diam: 3 77 cm  EDV(Teich): 98 58 ml  EF(Teich): 60 43 %  ESV(Teich): 39 01 ml  IVSd: 1 37 cm  LA Area: 16 92 cm2  LA Diam: 4 02 cm  LVEDV MOD A4C: 66 77 ml  LVEF MOD A4C: 61 53 %  LVESV MOD A4C: 25 69 ml  LVIDd: 4 62 cm  LVIDs: 3 14 cm  LVLd A4C: 7 71 cm  LVLs A4C: 6 55 cm  LVPWd: 1 39 cm  RA Area: 16 6 cm2  SV MOD A4C: 41 08 ml  SV(Teich): 59 57 ml    CW  TR Vmax: 1 4 m/s  TR maxP 82 mmHg    MM  TAPSE: 1 48 cm    PW  E': 0 06 m/s  E/E': 19 16  MV A Eloy: 0 48 m/s  MV Dec [place]: 5 33 m/s2  MV DecT: 223 53 ms  MV E [place]: 1 19 m/s  MV E/A Ratio: 2 51  MV PHT: 64 82 ms  MVA By PHT: 3 39 cm2    [employer] Accredited Echocardiography [facility]    Prepared and electronically signed by    Gilmar Ryan MD  Signed 04-Oct-2018 14:19:38         ======================================================    Lab Results   Component Value Date    WBC 6 30 2020    HGB 14 9 2020    HCT 43 7 2020    MCV 94 2020     (L) 2020      Lab Results   Component Value Date    SODIUM 143 2021    K 4 2 2021     2021    CO2 29 2021    BUN 23 2021    CREATININE 1 50 2021    CALCIUM 9 0 2021      Lab Results   Component Value Date    HGBA1C 6 8 (A) 2021      Lab Results   Component Value Date    CHOL 122 2018     Lab Results   Component Value Date    HDL 25 (L) 2021    HDL 31 (L) 2020    HDL 28 (L) 2020     Lab Results   Component Value Date    LDLCALC 51 2021    LDLCALC 45 2020    LDLCALC 61 2020     Lab Results   Component Value Date    TRIG 72 2021    TRIG 141 2020    TRIG 79 2020     No results found for: CHOLHDL   No results found for: INR, PROTIME     Imaging:   I have personally reviewed pertinent reports  Portions of the record may have been created with voice recognition software  Occasional wrong word or "sound a like" substitutions may have occurred due to the inherent limitations of voice recognition software   Read the chart carefully and recognize, using context, where substitutions have occurred

## 2021-05-04 NOTE — LETTER
May 4, 2021     Melba Lyons MD  [address]  [address] [address],[address]  [address]    Patient: Belkys Ledesma   YOB: 1945   Date of Visit: 5/4/2021       Dear Dr Mary Ann Stevenson: Thank you for referring Belkys Ledesma to me for evaluation  Below are my notes for this consultation  If you have questions, please do not hesitate to call me  I look forward to following your patient along with you  Sincerely,        Shaune Frankel, MD        CC: No Recipients  Shaune Frankel, MD  5/4/2021 10:19 AM  Sign when Signing Visit                                             Cardiology Follow Up    Belkys Ledesma  1945  [id number]  [address]  [address] [facility] [address] [phone]    1  Coronary artery disease involving native coronary artery of native heart without angina pectoris     2  Pure hypercholesterolemia     3  Old myocardial infarction     4  BMI 35 0-35 9,adult     5  CKD stage 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/A2, GFR 45-59 and albumin creatinine ratio  mg/g ([facility])     6  Essential hypertension     7  S/P CABG x 3  2016     8  Thrombocytopenia ([address] )     9  Type 2 diabetes mellitus with hyperglycemia, without long-term current use of insulin [facility])         Patient was 1st seen in our office on March 5, 2013 for midsternal chest discomfort and dyspnea on exertion  His electrocardiogram demonstrated old inferior wall myocardial infarction with possible lateral extension  Other diagnoses include hypertension, metabolic syndrome, hyperlipidemia  Cardiac catheterization demonstrated a 70 80% left anterior after the 2nd diagonal branch  Circumflex artery minor luminal irregularities  Right coronary artery dominant in distribution with a proximal 90% lesion  Patient underwent coronary artery bypass surgery with a LIMA to the LAD, SVG to the right coronary artery, SVG to the OM branch of the circumflex      03/19/2013 echocardiogram: Normal left ventricular systolic and diastolic function  Mildly elevated left ventricular filling pressures  Aortic sclerosis with mild aortic regurgitation  Moderately severe concentric left ventricular hypertrophy  07/15/2019 lipid profile: Total cholesterol 105, triglycerides 62, HDL 34, LDL calculated 59, non HDL cholesterol 71    7/16/2020 Lipid Profile: Total Cholesterol 104, Triglycerides 141, HDL 31, LDL Calculated 45, Non HDL Cholesterol 73    02/04/2021 lipid profile: Cholesterol 90, triglycerides 72, HDL 25, LDL calculated 51, non HDL cholesterol 65  Interval History: Patient's blood pressure is excellent 126/76  His cholesterol is excellent with an LDL of 51 and a non HDL cholesterol 65  He has no cardiac complaints  He is trying to lose weight but finding it very difficult  Patient denies chest discomfort or shortness of breath  Patient has no palpitations  Patient denies symptoms of dizziness, lightheadedness or near-syncope/syncope  Patient denies leg edema  Patient denies symptoms of orthopnea or paroxysmal nocturnal dyspnea        Discussion/Summary:  Return in 6 months    Patient Active Problem List   Diagnosis    CAD (coronary artery disease)    Diverticulosis    Hypertension    Hyperlipidemia    Old myocardial infarction    S/P CABG x 3  2016    Thrombocytopenia ([facility])    Type 2 diabetes mellitus with hyperglycemia, without long-term current use of insulin ([facility])    BMI 35 0-35 9,adult    CKD stage G3a/A2, GFR 45-59 and albumin creatinine ratio  mg/g ([facility])     Past Medical History:   Diagnosis Date    CAD (coronary artery disease)     Coronary artery disease     [facility]     Diverticulosis     Hyperglycemia     Hyperlipidemia     Hypertension     Obesity      Social History     Socioeconomic History    Marital status: /Civil Union     Spouse name: Not on file    Number of children: Not on file    Years of education: Not on file    Highest education level: Not on file   Occupational History    Not on file   Social Needs    Financial resource strain: Not on file    Food insecurity     Worry: Not on file     Inability: Not on file    Transportation needs     Medical: Not on file     Non-medical: Not on file   Tobacco Use    Smoking status: Former Smoker     Types: Cigarettes     Quit date: 2012     Years since quittin 3    Smokeless tobacco: Never Used   Substance and Sexual Activity    Alcohol use: Yes     Frequency: 2-4 times a month     Drinks per session: 1 or 2     Binge frequency: Never    Drug use: No    Sexual activity: Yes     Partners: Female   Lifestyle    Physical activity     Days per week: Not on file     Minutes per session: Not on file    Stress: Not on file   Relationships    Social connections     Talks on phone: Not on file     Gets together: Not on file     Attends Baptism service: Not on file     Active member of club or organization: Not on file     Attends meetings of clubs or organizations: Not on file     Relationship status: Not on file    Intimate partner violence     Fear of current or ex partner: Not on file     Emotionally abused: Not on file     Physically abused: Not on file     Forced sexual activity: Not on file   Other Topics Concern    Not on file   Social History Narrative    Not on file      Family History   Problem Relation Age of Onset    Hypertension Mother      Past Surgical History:   Procedure Laterality Date    CARDIOVASCULAR STRESS TEST  2013    COLONOSCOPY      2020:diverticulosis    COLONOSCOPY W/ POLYPECTOMY      17:colon,polyp      CORONARY ARTERY BYPASS GRAFT  01/21/2016    x3       Current Outpatient Medications:     aspirin 81 mg chewable tablet, Chew 1 tablet (81 mg total) every 24 hours, Disp: 90 tablet, Rfl: 3    atorvastatin (LIPITOR) 80 mg tablet, Take 1 tablet (80 mg total) by mouth every 24 hours, Disp: 90 tablet, Rfl: 3    clopidogrel (Plavix) 75 mg tablet, Take 1 tablet (75 mg total) by mouth every 24 hours, Disp: 90 tablet, Rfl: 3    ezetimibe (ZETIA) 10 mg tablet, Take 1 tablet (10 mg total) by mouth daily, Disp: 90 tablet, Rfl: 3    metFORMIN (GLUCOPHAGE-XR) 750 mg 24 hr tablet, Take 1 tablet (750 mg total) by mouth daily with breakfast, Disp: 90 tablet, Rfl: 3    metoprolol succinate (TOPROL-XL) 100 mg 24 hr tablet, Take 1 tablet (100 mg total) by mouth every 24 hours, Disp: 90 tablet, Rfl: 3    nitroglycerin (NITROSTAT) 0 4 mg SL tablet, Place 1 tablet (0 4 mg total) under the tongue every 5 (five) minutes as needed for chest pain, Disp: 25 tablet, Rfl: 2    olmesartan-hydrochlorothiazide (BENICAR HCT) 40-12 5 MG per tablet, Take 1 tablet by mouth daily, Disp: 90 tablet, Rfl: 3    tamsulosin (Flomax) 0 4 mg, Take 1 capsule (0 4 mg total) by mouth every 24 hours, Disp: 90 capsule, Rfl: 3  Allergies   Allergen Reactions    No Active Allergies        No results found for this visit on 05/04/21  Review of Systems:  Review of Systems   Constitutional: Negative for activity change  Respiratory: Negative for cough, chest tightness, shortness of breath and wheezing  Cardiovascular: Negative for chest pain, palpitations and leg swelling  Musculoskeletal: Negative for gait problem  Skin: Negative for color change  Neurological: Negative for dizziness, tremors, syncope, weakness, light-headedness and headaches  Psychiatric/Behavioral: Negative for agitation and confusion  Physical Exam:  /76 (BP Location: Right arm, Patient Position: Sitting, Cuff Size: Large)   Pulse 63   Wt 92 2 kg (203 lb 3 2 oz)   BMI 36 00 kg/m²   Physical Exam  Vitals signs reviewed  Constitutional:       General: He is not in acute distress  Appearance: He is well-developed  HENT:      Head: Normocephalic and atraumatic  Neck:      Vascular: No carotid bruit or JVD  Cardiovascular:      Rate and Rhythm: Normal rate and regular rhythm  Heart sounds: Normal heart sounds   No murmur  No friction rub  No gallop  Pulmonary:      Effort: Pulmonary effort is normal  No respiratory distress  Breath sounds: Normal breath sounds  No wheezing, rhonchi or rales  Chest:      Chest wall: No tenderness  Abdominal:      General: Bowel sounds are normal  There is no distension  Palpations: Abdomen is soft  Musculoskeletal:      Right lower leg: No edema  Left lower leg: No edema  Skin:     General: Skin is warm and dry  Neurological:      General: No focal deficit present  Mental Status: He is alert and oriented to person, place, and time  Mental status is at baseline  Psychiatric:         Mood and Affect: Mood normal          Behavior: Behavior normal          Thought Content: Thought content normal          Judgment: Judgment normal          --------------------------------------------------------------------------------  ECHO:   Results for orders placed during the hospital encounter of 10/04/18   Echo complete with contrast if indicated    [facility] [employer]  [address] [place] [address]    Transthoracic Echocardiogram  2D, M-mode, Doppler, and Color Doppler    Study date:  04-Oct-2018    Patient: Vern Patel  MR number: [id number]  Account number: [de-identified]  : 1945  Age: 68 years  Gender: Male  Status: Outpatient  [facility]: [facility]  Height: 63 in  Weight: 189 6 lb  BP: 124/ 72 mmHg    Indications: Hypertension CAD Shortness of breath  Diagnoses: I10  - Essential (primary) hypertension    Sonographer:  Scar Ryan RDCS  Primary Physician:  Samra Waters MD  Referring Physician:  Adriana Cedeño MD  Group:  [facility]  Interpreting Physician:  Adriana Cedeño MD    SUMMARY    SUMMARY:  1  Sinus rhythm with premature ventricular contractions  2  Good technical quality  3  Normal left ventricular systolic function, EF 91%  4  Grade 3 diastolic left ventricular dysfunction  5  Moderate concentric left ventricular hypertrophy  6  Mild biatrial enlargement  7  Aortic sclerosis without stenosis  LEFT VENTRICLE:  Normal left ventricular systolic function, EF 95%  Normal left ventricular cavity size  Moderate concentric left ventricular hypertrophy  Normal left ventricular wall motion without regional wall motion abnormalities  Type 3 left  ventricular diastolic dysfunction with mildly elevated left ventricular filling pressures  RIGHT VENTRICLE:  Normal right ventricular systolic function and cavitary size  LEFT ATRIUM:  Mild left atrial enlargement  RIGHT ATRIUM:  Mild right atrial enlargement  MITRAL VALVE:  Trivial mitral regurgitation  AORTIC VALVE:  Tricuspid aortic valve with aortic sclerosis  Mild aortic regurgitation  PULMONIC VALVE:  Trace pulmonic regurgitation  AORTA:  Borderline aortic root enlargement at 2 8 cm  PULMONARY ARTERIES:  Normal pulmonary artery systolic pressures  PERICARDIUM:  No pericardial effusion  HISTORY: PRIOR HISTORY: Risk factors: hypertension and morbid obesity  PRIOR PROCEDURES: Coronary bypass  PROCEDURE: The study was performed in the [address] [place] [address]  This was a routine study  The transthoracic approach was used  The study included complete 2D imaging, M-mode, complete spectral Doppler, and color Doppler  The heart rate was 62  bpm, at the start of the study  This was a technically difficult study  LEFT VENTRICLE: Normal left ventricular systolic function, EF 21%  Normal left ventricular cavity size  Moderate concentric left ventricular hypertrophy  Normal left ventricular wall motion without regional wall motion abnormalities  Type  3 left ventricular diastolic dysfunction with mildly elevated left ventricular filling pressures  RIGHT VENTRICLE: Normal right ventricular systolic function and cavitary size  LEFT ATRIUM: Mild left atrial enlargement  RIGHT ATRIUM: Mild right atrial enlargement      MITRAL VALVE: Trivial mitral regurgitation  AORTIC VALVE: Tricuspid aortic valve with aortic sclerosis  Mild aortic regurgitation  PULMONIC VALVE: Trace pulmonic regurgitation  PERICARDIUM: No pericardial effusion  There was no pericardial effusion  The pericardium was normal in appearance  AORTA: Borderline aortic root enlargement at 2 8 cm  PULMONARY ARTERY: Normal pulmonary artery systolic pressures      SYSTEM MEASUREMENT TABLES    2D  %FS: 32 19 %  Ao Diam: 3 77 cm  EDV(Teich): 98 58 ml  EF(Teich): 60 43 %  ESV(Teich): 39 01 ml  IVSd: 1 37 cm  LA Area: 16 92 cm2  LA Diam: 4 02 cm  LVEDV MOD A4C: 66 77 ml  LVEF MOD A4C: 61 53 %  LVESV MOD A4C: 25 69 ml  LVIDd: 4 62 cm  LVIDs: 3 14 cm  LVLd A4C: 7 71 cm  LVLs A4C: 6 55 cm  LVPWd: 1 39 cm  RA Area: 16 6 cm2  SV MOD A4C: 41 08 ml  SV(Teich): 59 57 ml    CW  TR Vmax: 1 4 m/s  TR maxP 82 mmHg    MM  TAPSE: 1 48 cm    PW  E': 0 06 m/s  E/E': 19 16  MV A [place]: 0 48 m/s  MV Dec [place]: 5 33 m/s2  MV DecT: 223 53 ms  MV E [place]: 1 19 m/s  MV E/A Ratio: 2 51  MV PHT: 64 82 ms  MVA By PHT: 3 39 cm2    [facility] Accredited Echocardiography Laboratory    Prepared and electronically signed by    Olga Sibley MD  Signed 04-Oct-2018 14:19:38         ======================================================    Lab Results   Component Value Date    WBC 6 30 2020    HGB 14 9 2020    HCT 43 7 2020    MCV 94 2020     (L) 2020      Lab Results   Component Value Date    SODIUM 143 2021    K 4 2 2021     2021    CO2 29 2021    BUN 23 2021    CREATININE 1 50 2021    CALCIUM 9 0 2021      Lab Results   Component Value Date    HGBA1C 6 8 (A) 2021      Lab Results   Component Value Date    CHOL 122 2018     Lab Results   Component Value Date    HDL 25 (L) 2021    HDL 31 (L) 2020    HDL 28 (L) 2020     Lab Results   Component Value Date    LDLCALC 51 02/04/2021    LDLCALC 45 07/16/2020    LDLCALC 61 02/11/2020     Lab Results   Component Value Date    TRIG 72 02/04/2021    TRIG 141 07/16/2020    TRIG 79 02/11/2020     No results found for: CHOLHDL   No results found for: INR, PROTIME     Imaging:   I have personally reviewed pertinent reports  Portions of the record may have been created with voice recognition software  Occasional wrong word or "sound a like" substitutions may have occurred due to the inherent limitations of voice recognition software  Read the chart carefully and recognize, using context, where substitutions have occurred

## 2021-05-05 ENCOUNTER — LAB (OUTPATIENT)
Dept: LAB | Facility: HOSPITAL | Age: 76
End: 2021-05-05
Payer: COMMERCIAL

## 2021-05-05 DIAGNOSIS — E11.65 TYPE 2 DIABETES MELLITUS WITH HYPERGLYCEMIA, WITHOUT LONG-TERM CURRENT USE OF INSULIN (HCC): ICD-10-CM

## 2021-05-05 DIAGNOSIS — D69.6 THROMBOCYTOPENIA (HCC): ICD-10-CM

## 2021-05-05 LAB
ALBUMIN SERPL BCP-MCNC: 3.8 G/DL (ref 3–5.2)
ALP SERPL-CCNC: 117 U/L (ref 43–122)
ALT SERPL W P-5'-P-CCNC: 54 U/L
ANION GAP SERPL CALCULATED.3IONS-SCNC: 8 MMOL/L (ref 5–14)
AST SERPL W P-5'-P-CCNC: 34 U/L (ref 17–59)
BASOPHILS # BLD AUTO: 0 THOUSANDS/ΜL (ref 0–0.1)
BASOPHILS NFR BLD AUTO: 1 % (ref 0–1)
BILIRUB SERPL-MCNC: 0.62 MG/DL
BUN SERPL-MCNC: 21 MG/DL (ref 5–25)
CALCIUM SERPL-MCNC: 9 MG/DL (ref 8.4–10.2)
CHLORIDE SERPL-SCNC: 108 MMOL/L (ref 97–108)
CO2 SERPL-SCNC: 24 MMOL/L (ref 22–30)
CREAT SERPL-MCNC: 1.31 MG/DL (ref 0.7–1.5)
EOSINOPHIL # BLD AUTO: 0.1 THOUSAND/ΜL (ref 0–0.4)
EOSINOPHIL NFR BLD AUTO: 3 % (ref 0–6)
ERYTHROCYTE [DISTWIDTH] IN BLOOD BY AUTOMATED COUNT: 13.9 %
GFR SERPL CREATININE-BSD FRML MDRD: 53 ML/MIN/1.73SQ M
GLUCOSE P FAST SERPL-MCNC: 95 MG/DL (ref 70–99)
HCT VFR BLD AUTO: 43 % (ref 41–53)
HGB BLD-MCNC: 14.6 G/DL (ref 13.5–17.5)
LYMPHOCYTES # BLD AUTO: 1 THOUSANDS/ΜL (ref 0.5–4)
LYMPHOCYTES NFR BLD AUTO: 18 % (ref 25–45)
MCH RBC QN AUTO: 32 PG (ref 26–34)
MCHC RBC AUTO-ENTMCNC: 34 G/DL (ref 31–36)
MCV RBC AUTO: 94 FL (ref 80–100)
MONOCYTES # BLD AUTO: 0.6 THOUSAND/ΜL (ref 0.2–0.9)
MONOCYTES NFR BLD AUTO: 11 % (ref 1–10)
NEUTROPHILS # BLD AUTO: 3.9 THOUSANDS/ΜL (ref 1.8–7.8)
NEUTS SEG NFR BLD AUTO: 68 % (ref 45–65)
PLATELET # BLD AUTO: 147 THOUSANDS/UL (ref 150–450)
PMV BLD AUTO: 10.1 FL (ref 8.9–12.7)
POTASSIUM SERPL-SCNC: 4.1 MMOL/L (ref 3.6–5)
PROT SERPL-MCNC: 6.7 G/DL (ref 5.9–8.4)
RBC # BLD AUTO: 4.57 MILLION/UL (ref 4.5–5.9)
SODIUM SERPL-SCNC: 140 MMOL/L (ref 137–147)
WBC # BLD AUTO: 5.7 THOUSAND/UL (ref 4.5–11)

## 2021-05-05 PROCEDURE — 36415 COLL VENOUS BLD VENIPUNCTURE: CPT

## 2021-05-05 PROCEDURE — 80053 COMPREHEN METABOLIC PANEL: CPT

## 2021-05-05 PROCEDURE — 85025 COMPLETE CBC W/AUTO DIFF WBC: CPT

## 2021-06-29 DIAGNOSIS — E78.00 PURE HYPERCHOLESTEROLEMIA: ICD-10-CM

## 2021-07-01 DIAGNOSIS — E78.00 PURE HYPERCHOLESTEROLEMIA: ICD-10-CM

## 2021-07-01 RX ORDER — EZETIMIBE 10 MG/1
10 TABLET ORAL DAILY
Qty: 90 TABLET | Refills: 3 | Status: SHIPPED | OUTPATIENT
Start: 2021-07-01 | End: 2021-09-27 | Stop reason: SDUPTHER

## 2021-07-01 RX ORDER — EZETIMIBE 10 MG/1
TABLET ORAL
Qty: 90 TABLET | Refills: 0 | Status: SHIPPED | OUTPATIENT
Start: 2021-07-01 | End: 2021-07-01 | Stop reason: SDUPTHER

## 2021-07-26 DIAGNOSIS — I10 ESSENTIAL HYPERTENSION: ICD-10-CM

## 2021-07-27 DIAGNOSIS — I10 ESSENTIAL HYPERTENSION: ICD-10-CM

## 2021-07-27 RX ORDER — METOPROLOL SUCCINATE 100 MG/1
TABLET, EXTENDED RELEASE ORAL
Qty: 90 TABLET | Refills: 0 | Status: SHIPPED | OUTPATIENT
Start: 2021-07-27 | End: 2021-07-27 | Stop reason: SDUPTHER

## 2021-07-27 RX ORDER — ATORVASTATIN CALCIUM 80 MG/1
TABLET, FILM COATED ORAL
Qty: 90 TABLET | Refills: 0 | Status: SHIPPED | OUTPATIENT
Start: 2021-07-27 | End: 2021-07-27 | Stop reason: SDUPTHER

## 2021-07-28 RX ORDER — ATORVASTATIN CALCIUM 80 MG/1
80 TABLET, FILM COATED ORAL DAILY
Qty: 90 TABLET | Refills: 3 | Status: SHIPPED | OUTPATIENT
Start: 2021-07-28 | End: 2021-09-30 | Stop reason: SDUPTHER

## 2021-07-28 RX ORDER — METOPROLOL SUCCINATE 100 MG/1
100 TABLET, EXTENDED RELEASE ORAL DAILY
Qty: 90 TABLET | Refills: 3 | Status: SHIPPED | OUTPATIENT
Start: 2021-07-28 | End: 2021-09-30 | Stop reason: SDUPTHER

## 2021-08-19 DIAGNOSIS — N40.0 BENIGN PROSTATIC HYPERPLASIA WITHOUT LOWER URINARY TRACT SYMPTOMS: ICD-10-CM

## 2021-08-19 RX ORDER — TAMSULOSIN HYDROCHLORIDE 0.4 MG/1
0.4 CAPSULE ORAL EVERY 24 HOURS
Qty: 90 CAPSULE | Refills: 3 | Status: SHIPPED | OUTPATIENT
Start: 2021-08-19 | End: 2021-09-30 | Stop reason: SDUPTHER

## 2021-09-07 ENCOUNTER — OFFICE VISIT (OUTPATIENT)
Dept: FAMILY MEDICINE CLINIC | Facility: CLINIC | Age: 76
End: 2021-09-07
Payer: COMMERCIAL

## 2021-09-07 VITALS
HEART RATE: 64 BPM | SYSTOLIC BLOOD PRESSURE: 136 MMHG | OXYGEN SATURATION: 98 % | WEIGHT: 204 LBS | HEIGHT: 63 IN | DIASTOLIC BLOOD PRESSURE: 70 MMHG | TEMPERATURE: 97.9 F | RESPIRATION RATE: 16 BRPM | BODY MASS INDEX: 36.14 KG/M2

## 2021-09-07 DIAGNOSIS — E11.65 TYPE 2 DIABETES MELLITUS WITH HYPERGLYCEMIA, WITHOUT LONG-TERM CURRENT USE OF INSULIN (HCC): Primary | ICD-10-CM

## 2021-09-07 DIAGNOSIS — Z23 NEED FOR SHINGLES VACCINE: ICD-10-CM

## 2021-09-07 DIAGNOSIS — Z00.00 MEDICARE ANNUAL WELLNESS VISIT, SUBSEQUENT: ICD-10-CM

## 2021-09-07 DIAGNOSIS — I10 ESSENTIAL HYPERTENSION: ICD-10-CM

## 2021-09-07 LAB — SL AMB POCT HEMOGLOBIN AIC: 6.7 (ref ?–6.5)

## 2021-09-07 PROCEDURE — G0439 PPPS, SUBSEQ VISIT: HCPCS | Performed by: FAMILY MEDICINE

## 2021-09-07 PROCEDURE — 83036 HEMOGLOBIN GLYCOSYLATED A1C: CPT | Performed by: FAMILY MEDICINE

## 2021-09-07 PROCEDURE — 99214 OFFICE O/P EST MOD 30 MIN: CPT | Performed by: FAMILY MEDICINE

## 2021-09-07 RX ORDER — ZOSTER VACCINE RECOMBINANT, ADJUVANTED 50 MCG/0.5
0.5 KIT INTRAMUSCULAR ONCE
Qty: 1 EACH | Refills: 1 | Status: SHIPPED | OUTPATIENT
Start: 2021-09-07 | End: 2021-09-07

## 2021-09-07 RX ORDER — METFORMIN HYDROCHLORIDE 750 MG/1
750 TABLET, EXTENDED RELEASE ORAL
Qty: 90 TABLET | Refills: 3 | Status: SHIPPED | OUTPATIENT
Start: 2021-09-07 | End: 2021-09-07 | Stop reason: ALTCHOICE

## 2021-09-07 RX ORDER — SEMAGLUTIDE 1.34 MG/ML
0.25 INJECTION, SOLUTION SUBCUTANEOUS WEEKLY
Qty: 1.5 ML | Refills: 0 | Status: SHIPPED | OUTPATIENT
Start: 2021-09-07 | End: 2021-09-30 | Stop reason: SDUPTHER

## 2021-09-07 NOTE — PROGRESS NOTES
Assessment/Plan: hemoglobin A1c 6 9%, patient has poor exercise and follow off an on a low carb diet  I explained to Neel the goals of blood sugar levels , with fasting  of 130 or less and  2 hrs after meals of 150 or less  Education given verbally and with written materials  Change His life style modification again stressed  The vascular complications secondary to diabetes poor control were explained with details  The renal function protection and the prevention of major vascular disease with the use of  angiotensin II receptor antagonist and statins respectively and exercise/diet was also discussed  Current treatment for diabetes was explained to patient Metformin which decreases hepatic glucose production and intestinal glucose absorption; increases insulin sensitivity  and I also believe he may be benefit from a GLP1 Receptor Agonist: activates glucagon-like-peptide-1 (GLP-1) receptor, increasing insulin secretion, decreasing glucagon secretion, and delaying gastric emptying (incretin mimetic)  Explained to patient that Shingles, also known as herpes zoster, is a painful skin rash caused by reactivation of the varicella zoster virus (the same virus that causes chickenpox)  Facts: 99% of people over 48years of age are living with the virus that causes shingles  1 in 3 people will get shingles in their lifetime  Merla Maffucci is the only vaccine proven to be up to 90% effective at preventing shingles in clinical trials  The BMI is above normal  Nutrition recommendations include reducing portion sizes, decreasing overall calorie intake, 3-5 servings of fruits/vegetables daily, reducing fast food intake, consuming healthier snacks, decreasing soda and/or juice intake, moderation in carbohydrate intake, increasing intake of lean protein, reducing intake of saturated fat and trans fat, and reducing intake of cholesterol   Exercise recommendations include moderate aerobic physical activity for 150 minutes/week, vigorous aerobic physical activity for 75 minutes/week if possible, the most usual recommendation is exercising 3-5 times per week, joining a gym is a reasonable option with the precaution of the current situation, and strength training exercises are always important as we the as well  No problem-specific Assessment & Plan notes found for this encounter  Diagnoses and all orders for this visit:    Type 2 diabetes mellitus with hyperglycemia, without long-term current use of insulin (HCC)  -     POCT hemoglobin A1c  -     Lipid panel; Future  -     Discontinue: metFORMIN (GLUCOPHAGE-XR) 750 mg 24 hr tablet; Take 1 tablet (750 mg total) by mouth daily with breakfast  -     Semaglutide,0 25 or 0 5MG/DOS, (Ozempic, 0 25 or 0 5 MG/DOSE,) 2 MG/1 5ML SOPN; Inject 0 25 mg under the skin once a week  -     metFORMIN (GLUCOPHAGE) 500 mg tablet; Take 1 tablet (500 mg total) by mouth 2 (two) times a day with meals   Essential hypertension    Medicare annual wellness visit, subsequent    BMI 36 0-36 9,adult    Need for shingles vaccine  -     Zoster Vac Recomb Adjuvanted (Shingrix) 50 MCG/0 5ML SUSR; Inject 0 5 mL into a muscle once for 1 dose Repeat dose in 2 to 6 months          Subjective:      Patient ID: Epifanio Lugo is a 77 y o  male  HPI  Patient is here to follow Diabetes and HTN, patient states good compliance with treatment  Denies chest pain, shortness of breath, angina, urinary problems  No exercise but follows low salt and low carbohydrates diet         Lab Results   Component Value Date/Time    HGBA1C 6 7 (A) 09/07/2021 09:30 AM    HGBA1C 6 4 (H) 07/16/2020 08:01 AM    MICROALBCRE 12 05/03/2021 10:19 AM    CHOLESTEROL 90 02/04/2021 10:43 AM    LDLCALC 51 02/04/2021 10:43 AM    LDLCALC 65 04/27/2018 07:03 PM    TRIG 72 02/04/2021 10:43 AM    TRIG 142 04/27/2018 07:03 PM    CREATININE 1 31 05/05/2021 09:09 AM    EGFR 53 (L) 05/05/2021 09:09 AM aspirin  atorvastatin  clopidogrel  ezetimibe  metFORMIN  metoprolol succinate  nitroglycerin  olmesartan-hydrochlorothiazide  Ozempic (0 25 or 0 5 MG/DOSE) Sopn  Shingrix Susr  tamsulosin the    The following portions of the patient's history were reviewed and updated as appropriate: allergies, current medications, past family history, past medical history, past social history, past surgical history and problem list     Review of Systems   Constitutional: Negative for diaphoresis, fatigue, fever and unexpected weight change  Respiratory: Negative for apnea, cough, choking, chest tightness and shortness of breath  Cardiovascular: Negative for chest pain, palpitations and leg swelling  Gastrointestinal: Negative for abdominal distention, abdominal pain, anal bleeding, blood in stool and constipation  Musculoskeletal: Negative for arthralgias, back pain, gait problem and joint swelling  Neurological: Negative for dizziness, facial asymmetry, light-headedness and headaches  Psychiatric/Behavioral: Negative for behavioral problems, dysphoric mood and self-injury  The patient is not nervous/anxious  Objective:      /70 (BP Location: Left arm, Patient Position: Sitting, Cuff Size: Standard)   Pulse 64   Temp 97 9 °F (36 6 °C) (Temporal)   Resp 16   Ht 5' 3" (1 6 m)   Wt 92 5 kg (204 lb)   SpO2 98%   BMI 36 14 kg/m²          Physical Exam  Vitals and nursing note reviewed  Neck:      Thyroid: No thyroid mass or thyromegaly  Vascular: No carotid bruit or JVD  Trachea: No tracheal tenderness  Cardiovascular:      Rate and Rhythm: Normal rate and regular rhythm  No extrasystoles are present  Pulses: Normal pulses  Heart sounds: Normal heart sounds  Heart sounds not distant  No friction rub  Pulmonary:      Effort: Pulmonary effort is normal  No tachypnea or bradypnea  Breath sounds: Normal breath sounds  No stridor     Abdominal:      General: Bowel sounds are normal  There is no abdominal bruit  Palpations: Abdomen is soft  There is no hepatomegaly or splenomegaly  Hernia: No hernia is present  Musculoskeletal:         General: Normal range of motion  Cervical back: No edema or rigidity  Skin:     General: Skin is warm and dry  Neurological:      Mental Status: He is oriented to person, place, and time  Deep Tendon Reflexes: Reflexes are normal and symmetric  Psychiatric:         Behavior: Behavior normal          Thought Content:  Thought content normal          Judgment: Judgment normal

## 2021-09-07 NOTE — PATIENT INSTRUCTIONS

## 2021-09-07 NOTE — PROGRESS NOTES
Assessment and Plan:     Problem List Items Addressed This Visit        Endocrine    Type 2 diabetes mellitus with hyperglycemia, without long-term current use of insulin (HCC) - Primary    Relevant Medications    Semaglutide,0 25 or 0 5MG/DOS, (Ozempic, 0 25 or 0 5 MG/DOSE,) 2 MG/1 5ML SOPN    metFORMIN (GLUCOPHAGE) 500 mg tablet    Other Relevant Orders    POCT hemoglobin A1c (Completed)    Lipid panel       Cardiovascular and Mediastinum    Hypertension       Other    Medicare annual wellness visit, subsequent    BMI 36 0-36 9,adult    Need for shingles vaccine    Relevant Medications    Zoster Vac Recomb Adjuvanted (Shingrix) 50 MCG/0 5ML SUSR           Preventive health issues were discussed with patient, and age appropriate screening tests were ordered as noted in patient's After Visit Summary  Personalized health advice and appropriate referrals for health education or preventive services given if needed, as noted in patient's After Visit Summary       History of Present Illness:     Patient presents for Medicare Annual Wellness visit    Patient Care Team:  Dayna Schwartz MD as PCP - General (Family Medicine)     Problem List:     Patient Active Problem List   Diagnosis    CAD (coronary artery disease)    Diverticulosis    Hypertension    Hyperlipidemia    Old myocardial infarction    S/P CABG x 3  2016    Thrombocytopenia ([address] )    Type 2 diabetes mellitus with hyperglycemia, without long-term current use of insulin ([facility] [address]    Medicare annual wellness visit, subsequent    BMI 36 0-36 9,adult    CKD stage G3a/A2, GFR 45-59 and albumin creatinine ratio  mg/g (HCC)    Need for shingles vaccine      Past Medical and Surgical History:     Past Medical History:   Diagnosis Date    CAD (coronary artery disease)     Coronary artery disease     Diabetes mellitus ([facility] [address] )     Diverticulosis     Hyperglycemia     Hyperlipidemia     Hypertension     Obesity      Past Surgical History:   Procedure Laterality Date    CARDIOVASCULAR STRESS TEST  2013    COLONOSCOPY      2020:diverticulosis    COLONOSCOPY W/ POLYPECTOMY      17:colon,polyp      CORONARY ARTERY BYPASS GRAFT  01/21/2016    x3      Family History:     Family History   Problem Relation Age of Onset    Hypertension Mother       Social History:     Social History     Socioeconomic History    Marital status: /Civil Union     Spouse name: None    Number of children: None    Years of education: None    Highest education level: None   Occupational History    None   Tobacco Use    Smoking status: Former Smoker     Types: Cigarettes     Quit date: 2012     Years since quittin 6    Smokeless tobacco: Never Used   Substance and Sexual Activity    Alcohol use: Yes    Drug use: No    Sexual activity: Yes     Partners: Female   Other Topics Concern    None   Social History Narrative    None     Social Determinants of Health     Financial Resource Strain:     Difficulty of Paying Living Expenses:    Food Insecurity:     Worried About Running Out of Food in the Last Year:     [address] in the Last Year:    Transportation Needs:     Lack of Transportation (Medical):      Lack of Transportation (Non-Medical):    Physical Activity:     Days of Exercise per Week:     Minutes of Exercise per Session:    Stress:     Feeling of Stress :    Social Connections:     Frequency of Communication with Friends and Family:     Frequency of Social Gatherings with Friends and Family:     Attends [employer] Services:     Active Member of Clubs or Organizations:     Attends Club or Organization Meetings:     Marital Status:    Intimate Partner Violence:     Fear of Current or Ex-Partner:     Emotionally Abused:     Physically Abused:     Sexually Abused:       Medications and Allergies:     Current Outpatient Medications   Medication Sig Dispense Refill    aspirin 81 mg chewable tablet Chew 1 tablet (81 mg total) every 24 hours 90 tablet 3    atorvastatin (LIPITOR) 80 mg tablet Take 1 tablet (80 mg total) by mouth daily 90 tablet 3    clopidogrel (Plavix) 75 mg tablet Take 1 tablet (75 mg total) by mouth every 24 hours 90 tablet 3    ezetimibe (ZETIA) 10 mg tablet Take 1 tablet (10 mg total) by mouth daily 90 tablet 3    metFORMIN (GLUCOPHAGE) 500 mg tablet Take 1 tablet (500 mg total) by mouth 2 (two) times a day with meals   60 tablet 5    metoprolol succinate (TOPROL-XL) 100 mg 24 hr tablet Take 1 tablet (100 mg total) by mouth daily 90 tablet 3    nitroglycerin (NITROSTAT) 0 4 mg SL tablet Place 1 tablet (0 4 mg total) under the tongue every 5 (five) minutes as needed for chest pain 25 tablet 2    olmesartan-hydrochlorothiazide (BENICAR HCT) 40-12 5 MG per tablet Take 1 tablet by mouth daily 90 tablet 3    Semaglutide,0 25 or 0 5MG/DOS, (Ozempic, 0 25 or 0 5 MG/DOSE,) 2 MG/1 5ML SOPN Inject 0 25 mg under the skin once a week 1 5 mL 0    tamsulosin (Flomax) 0 4 mg Take 1 capsule (0 4 mg total) by mouth every 24 hours 90 capsule 3    Zoster Vac Recomb Adjuvanted (Shingrix) 50 MCG/0 5ML SUSR Inject 0 5 mL into a muscle once for 1 dose Repeat dose in 2 to 6 months 1 each 1     No current facility-administered medications for this visit       Allergies   Allergen Reactions    No Active Allergies       Immunizations:     Immunization History   Administered Date(s) Administered    H1N1, All Formulations 10/22/2009    INFLUENZA 10/14/2015, 10/14/2015, 11/09/2018    Influenza Split 10/30/2013    Influenza, high dose seasonal 0 7 mL 11/09/2018, 11/04/2019, 09/25/2020    Influenza, seasonal, injectable 09/21/2011, 09/25/2012, 10/23/2014    Pneumococcal Conjugate 13-Valent 07/29/2019    Pneumococcal Polysaccharide PPV23 02/02/2012, 04/19/2016    SARS-CoV-2 / COVID-19 mRNA IM (Pfizer-BioNTech) 03/11/2021, 04/08/2021    Tdap 04/19/2016    Zoster 05/14/2012      Health Maintenance:         Topic Date Due    Colorectal Cancer Screening  04/08/2023    Hepatitis C Screening  Completed         Topic Date Due    Influenza Vaccine (1) 09/01/2021      Medicare Health Risk Assessment:     /70 (BP Location: Left arm, Patient Position: Sitting, Cuff Size: Standard)   Pulse 64   Temp 97 9 °F (36 6 °C) (Temporal)   Resp 16   Ht 5' 3" (1 6 m)   Wt 92 5 kg (204 lb)   SpO2 98%   BMI 36 14 kg/m²      Neel is here for his Subsequent Wellness visit  Last Medicare Wellness visit information reviewed, patient interviewed and updates made to the record today  Health Risk Assessment:   Patient rates overall health as fair  Patient feels that their physical health rating is same  Patient is satisfied with their life  Eyesight was rated as same  Hearing was rated as slightly worse  Patient feels that their emotional and mental health rating is same  Patients states they are never, rarely angry  Patient states they are sometimes unusually tired/fatigued  Pain experienced in the last 7 days has been some  Patient's pain rating has been 2/10  Patient states that he has experienced no weight loss or gain in last 6 months  Fall Risk Screening: In the past year, patient has experienced: no history of falling in past year      Home Safety:  Patient does not have trouble with stairs inside or outside of their home  Patient has working smoke alarms and has working carbon monoxide detector  Home safety hazards include: none  Nutrition:   Current diet is Diabetic  Medications:   Patient is currently taking over-the-counter supplements  OTC medications include: see medication list  Patient is able to manage medications  Activities of Daily Living (ADLs)/Instrumental Activities of Daily Living (IADLs):   Walk and transfer into and out of bed and chair?: Yes  Dress and groom yourself?: Yes    Bathe or shower yourself?: Yes    Feed yourself?  Yes  Do your laundry/housekeeping?: Yes  Manage your money, pay your bills and track your expenses?: Yes  Make your own meals?: Yes    Do your own shopping?: Yes    Previous Hospitalizations:   Any hospitalizations or ED visits within the last 12 months?: Yes    How many hospitalizations have you had in the last year?: 1-2    Advance Care Planning:   Living will: No    Durable POA for healthcare: No    Advanced directive: No    Advanced directive counseling given: Yes    Five wishes given: Yes    Patient declined ACP directive: No    End of Life Decisions reviewed with patient: Yes    Provider agrees with end of life decisions: Yes      Cognitive Screening:   Provider or family/friend/caregiver concerned regarding cognition?: No    PREVENTIVE SCREENINGS      Cardiovascular Screening:    General: Screening Not Indicated and History Lipid Disorder      Diabetes Screening:     General: Screening Not Indicated and History Diabetes      Colorectal Cancer Screening:     General: Screening Current      Prostate Cancer Screening:    General: Screening Not Indicated      Osteoporosis Screening:    General: Screening Current      Abdominal Aortic Aneurysm (AAA) Screening:    Risk factors include: age between 73-69 yo and tobacco use        General: Screening Not Indicated      Lung Cancer Screening:     General: Screening Not Indicated      Hepatitis C Screening:    General: Screening Current    Other Counseling Topics:   Alcohol use counseling, car/seat belt/driving safety, skin self-exam, sunscreen and calcium and vitamin D intake and regular weightbearing exercise         Jaret Mancini MD

## 2021-09-09 ENCOUNTER — LAB (OUTPATIENT)
Dept: LAB | Facility: HOSPITAL | Age: 76
End: 2021-09-09
Payer: COMMERCIAL

## 2021-09-09 DIAGNOSIS — E11.65 TYPE 2 DIABETES MELLITUS WITH HYPERGLYCEMIA, WITHOUT LONG-TERM CURRENT USE OF INSULIN (HCC): ICD-10-CM

## 2021-09-09 LAB
CHOLEST SERPL-MCNC: 104 MG/DL
HDLC SERPL-MCNC: 30 MG/DL
LDLC SERPL CALC-MCNC: 51 MG/DL
NONHDLC SERPL-MCNC: 74 MG/DL
TRIGL SERPL-MCNC: 115 MG/DL

## 2021-09-09 PROCEDURE — 36415 COLL VENOUS BLD VENIPUNCTURE: CPT

## 2021-09-09 PROCEDURE — 80061 LIPID PANEL: CPT

## 2021-09-27 DIAGNOSIS — E78.00 PURE HYPERCHOLESTEROLEMIA: ICD-10-CM

## 2021-09-27 DIAGNOSIS — I25.10 CORONARY ARTERY DISEASE INVOLVING NATIVE CORONARY ARTERY OF NATIVE HEART WITHOUT ANGINA PECTORIS: ICD-10-CM

## 2021-09-27 DIAGNOSIS — I10 ESSENTIAL HYPERTENSION: ICD-10-CM

## 2021-09-27 RX ORDER — OLMESARTAN MEDOXOMIL AND HYDROCHLOROTHIAZIDE 40/12.5 40; 12.5 MG/1; MG/1
1 TABLET ORAL DAILY
Qty: 90 TABLET | Refills: 3 | Status: SHIPPED | OUTPATIENT
Start: 2021-09-27 | End: 2021-09-30 | Stop reason: SDUPTHER

## 2021-09-27 RX ORDER — EZETIMIBE 10 MG/1
10 TABLET ORAL DAILY
Qty: 90 TABLET | Refills: 3
Start: 2021-09-27 | End: 2021-09-30 | Stop reason: SDUPTHER

## 2021-09-27 RX ORDER — CLOPIDOGREL BISULFATE 75 MG/1
75 TABLET ORAL EVERY 24 HOURS
Qty: 90 TABLET | Refills: 3 | Status: SHIPPED | OUTPATIENT
Start: 2021-09-27 | End: 2021-09-30 | Stop reason: SDUPTHER

## 2021-09-30 ENCOUNTER — CLINICAL SUPPORT (OUTPATIENT)
Dept: FAMILY MEDICINE CLINIC | Facility: CLINIC | Age: 76
End: 2021-09-30
Payer: COMMERCIAL

## 2021-09-30 ENCOUNTER — TELEPHONE (OUTPATIENT)
Dept: FAMILY MEDICINE CLINIC | Facility: CLINIC | Age: 76
End: 2021-09-30

## 2021-09-30 DIAGNOSIS — I25.10 CORONARY ARTERY DISEASE INVOLVING NATIVE CORONARY ARTERY OF NATIVE HEART WITHOUT ANGINA PECTORIS: ICD-10-CM

## 2021-09-30 DIAGNOSIS — N40.0 BENIGN PROSTATIC HYPERPLASIA WITHOUT LOWER URINARY TRACT SYMPTOMS: ICD-10-CM

## 2021-09-30 DIAGNOSIS — Z95.1 S/P CABG X 3: ICD-10-CM

## 2021-09-30 DIAGNOSIS — E11.65 TYPE 2 DIABETES MELLITUS WITH HYPERGLYCEMIA, WITHOUT LONG-TERM CURRENT USE OF INSULIN (HCC): ICD-10-CM

## 2021-09-30 DIAGNOSIS — E78.00 PURE HYPERCHOLESTEROLEMIA: ICD-10-CM

## 2021-09-30 DIAGNOSIS — Z23 NEEDS FLU SHOT: Primary | ICD-10-CM

## 2021-09-30 DIAGNOSIS — I10 ESSENTIAL HYPERTENSION: ICD-10-CM

## 2021-09-30 PROCEDURE — 90662 IIV NO PRSV INCREASED AG IM: CPT

## 2021-09-30 PROCEDURE — G0008 ADMIN INFLUENZA VIRUS VAC: HCPCS

## 2021-09-30 RX ORDER — OLMESARTAN MEDOXOMIL AND HYDROCHLOROTHIAZIDE 40/12.5 40; 12.5 MG/1; MG/1
1 TABLET ORAL DAILY
Qty: 90 TABLET | Refills: 3 | Status: SHIPPED | OUTPATIENT
Start: 2021-09-30 | End: 2021-09-30 | Stop reason: SDUPTHER

## 2021-09-30 RX ORDER — ATORVASTATIN CALCIUM 80 MG/1
80 TABLET, FILM COATED ORAL DAILY
Qty: 90 TABLET | Refills: 3 | Status: SHIPPED | OUTPATIENT
Start: 2021-09-30 | End: 2021-10-22 | Stop reason: SDUPTHER

## 2021-09-30 RX ORDER — EZETIMIBE 10 MG/1
10 TABLET ORAL DAILY
Qty: 90 TABLET | Refills: 3 | Status: SHIPPED | OUTPATIENT
Start: 2021-09-30 | End: 2022-01-11 | Stop reason: SDUPTHER

## 2021-09-30 RX ORDER — SEMAGLUTIDE 1.34 MG/ML
0.5 INJECTION, SOLUTION SUBCUTANEOUS WEEKLY
Qty: 1.5 ML | Refills: 3 | Status: SHIPPED | OUTPATIENT
Start: 2021-09-30 | End: 2022-01-11 | Stop reason: ALTCHOICE

## 2021-09-30 RX ORDER — EZETIMIBE 10 MG/1
10 TABLET ORAL DAILY
Qty: 90 TABLET | Refills: 3
Start: 2021-09-30 | End: 2021-09-30 | Stop reason: SDUPTHER

## 2021-09-30 RX ORDER — TAMSULOSIN HYDROCHLORIDE 0.4 MG/1
0.4 CAPSULE ORAL EVERY 24 HOURS
Qty: 90 CAPSULE | Refills: 3 | Status: SHIPPED | OUTPATIENT
Start: 2021-09-30 | End: 2022-01-11 | Stop reason: SDUPTHER

## 2021-09-30 RX ORDER — NITROGLYCERIN 0.4 MG/1
0.4 TABLET SUBLINGUAL
Qty: 25 TABLET | Refills: 2 | Status: SHIPPED | OUTPATIENT
Start: 2021-09-30 | End: 2022-01-11 | Stop reason: ALTCHOICE

## 2021-09-30 RX ORDER — NITROGLYCERIN 0.4 MG/1
0.4 TABLET SUBLINGUAL
Qty: 25 TABLET | Refills: 2 | Status: SHIPPED | OUTPATIENT
Start: 2021-09-30 | End: 2021-09-30 | Stop reason: SDUPTHER

## 2021-09-30 RX ORDER — ATORVASTATIN CALCIUM 80 MG/1
80 TABLET, FILM COATED ORAL DAILY
Qty: 90 TABLET | Refills: 3 | Status: SHIPPED | OUTPATIENT
Start: 2021-09-30 | End: 2021-09-30 | Stop reason: SDUPTHER

## 2021-09-30 RX ORDER — TAMSULOSIN HYDROCHLORIDE 0.4 MG/1
0.4 CAPSULE ORAL EVERY 24 HOURS
Qty: 90 CAPSULE | Refills: 3 | Status: SHIPPED | OUTPATIENT
Start: 2021-09-30 | End: 2021-09-30 | Stop reason: SDUPTHER

## 2021-09-30 RX ORDER — CLOPIDOGREL BISULFATE 75 MG/1
75 TABLET ORAL EVERY 24 HOURS
Qty: 90 TABLET | Refills: 3 | Status: SHIPPED | OUTPATIENT
Start: 2021-09-30 | End: 2022-01-11 | Stop reason: SDUPTHER

## 2021-09-30 RX ORDER — SEMAGLUTIDE 1.34 MG/ML
0.5 INJECTION, SOLUTION SUBCUTANEOUS WEEKLY
Qty: 1.5 ML | Refills: 3 | Status: SHIPPED | OUTPATIENT
Start: 2021-09-30 | End: 2021-09-30 | Stop reason: SDUPTHER

## 2021-09-30 RX ORDER — ASPIRIN 81 MG/1
81 TABLET, CHEWABLE ORAL EVERY 24 HOURS
Qty: 90 TABLET | Refills: 3 | Status: SHIPPED | OUTPATIENT
Start: 2021-09-30 | End: 2021-09-30 | Stop reason: SDUPTHER

## 2021-09-30 RX ORDER — ASPIRIN 81 MG/1
81 TABLET, CHEWABLE ORAL EVERY 24 HOURS
Qty: 90 TABLET | Refills: 3 | Status: SHIPPED | OUTPATIENT
Start: 2021-09-30 | End: 2022-01-11 | Stop reason: SDUPTHER

## 2021-09-30 RX ORDER — CLOPIDOGREL BISULFATE 75 MG/1
75 TABLET ORAL EVERY 24 HOURS
Qty: 90 TABLET | Refills: 3 | Status: SHIPPED | OUTPATIENT
Start: 2021-09-30 | End: 2021-09-30 | Stop reason: SDUPTHER

## 2021-09-30 RX ORDER — METOPROLOL SUCCINATE 100 MG/1
100 TABLET, EXTENDED RELEASE ORAL DAILY
Qty: 90 TABLET | Refills: 3 | Status: SHIPPED | OUTPATIENT
Start: 2021-09-30 | End: 2021-09-30 | Stop reason: SDUPTHER

## 2021-09-30 RX ORDER — OLMESARTAN MEDOXOMIL AND HYDROCHLOROTHIAZIDE 40/12.5 40; 12.5 MG/1; MG/1
1 TABLET ORAL DAILY
Qty: 90 TABLET | Refills: 3 | Status: SHIPPED | OUTPATIENT
Start: 2021-09-30 | End: 2022-01-11 | Stop reason: SDUPTHER

## 2021-09-30 RX ORDER — METOPROLOL SUCCINATE 100 MG/1
100 TABLET, EXTENDED RELEASE ORAL DAILY
Qty: 90 TABLET | Refills: 3 | Status: SHIPPED | OUTPATIENT
Start: 2021-09-30 | End: 2022-01-11 | Stop reason: SDUPTHER

## 2021-10-22 DIAGNOSIS — I10 ESSENTIAL HYPERTENSION: ICD-10-CM

## 2021-10-22 RX ORDER — ATORVASTATIN CALCIUM 80 MG/1
80 TABLET, FILM COATED ORAL DAILY
Qty: 90 TABLET | Refills: 3 | Status: SHIPPED | OUTPATIENT
Start: 2021-10-22 | End: 2022-01-11 | Stop reason: SDUPTHER

## 2021-11-19 ENCOUNTER — VBI (OUTPATIENT)
Dept: ADMINISTRATIVE | Facility: OTHER | Age: 76
End: 2021-11-19

## 2021-11-22 ENCOUNTER — OFFICE VISIT (OUTPATIENT)
Dept: CARDIOLOGY CLINIC | Facility: CLINIC | Age: 76
End: 2021-11-22
Payer: COMMERCIAL

## 2021-11-22 VITALS
RESPIRATION RATE: 16 BRPM | SYSTOLIC BLOOD PRESSURE: 122 MMHG | BODY MASS INDEX: 34.59 KG/M2 | DIASTOLIC BLOOD PRESSURE: 78 MMHG | WEIGHT: 195.2 LBS | HEIGHT: 63 IN | HEART RATE: 80 BPM

## 2021-11-22 DIAGNOSIS — E78.00 PURE HYPERCHOLESTEROLEMIA: ICD-10-CM

## 2021-11-22 DIAGNOSIS — I25.2 OLD MYOCARDIAL INFARCTION: ICD-10-CM

## 2021-11-22 DIAGNOSIS — I10 PRIMARY HYPERTENSION: ICD-10-CM

## 2021-11-22 DIAGNOSIS — Z95.1 S/P CABG X 3: ICD-10-CM

## 2021-11-22 DIAGNOSIS — N18.31 CKD STAGE G3A/A2, GFR 45-59 AND ALBUMIN CREATININE RATIO 30-299 MG/G (HCC): ICD-10-CM

## 2021-11-22 DIAGNOSIS — I25.10 CORONARY ARTERY DISEASE INVOLVING NATIVE CORONARY ARTERY OF NATIVE HEART WITHOUT ANGINA PECTORIS: Primary | ICD-10-CM

## 2021-11-22 PROCEDURE — 99214 OFFICE O/P EST MOD 30 MIN: CPT | Performed by: INTERNAL MEDICINE

## 2021-11-22 PROCEDURE — 93000 ELECTROCARDIOGRAM COMPLETE: CPT | Performed by: INTERNAL MEDICINE

## 2021-11-24 ENCOUNTER — VBI (OUTPATIENT)
Dept: ADMINISTRATIVE | Facility: OTHER | Age: 76
End: 2021-11-24

## 2021-12-18 ENCOUNTER — IMMUNIZATIONS (OUTPATIENT)
Dept: FAMILY MEDICINE CLINIC | Facility: HOSPITAL | Age: 76
End: 2021-12-18

## 2021-12-18 DIAGNOSIS — Z23 ENCOUNTER FOR IMMUNIZATION: Primary | ICD-10-CM

## 2021-12-18 PROCEDURE — 0001A COVID-19 PFIZER VACC 0.3 ML: CPT

## 2021-12-18 PROCEDURE — 91300 COVID-19 PFIZER VACC 0.3 ML: CPT

## 2022-01-11 ENCOUNTER — OFFICE VISIT (OUTPATIENT)
Dept: FAMILY MEDICINE CLINIC | Facility: CLINIC | Age: 77
End: 2022-01-11
Payer: COMMERCIAL

## 2022-01-11 VITALS
WEIGHT: 198 LBS | OXYGEN SATURATION: 96 % | RESPIRATION RATE: 16 BRPM | SYSTOLIC BLOOD PRESSURE: 136 MMHG | BODY MASS INDEX: 35.08 KG/M2 | DIASTOLIC BLOOD PRESSURE: 80 MMHG | TEMPERATURE: 98 F | HEIGHT: 63 IN | HEART RATE: 78 BPM

## 2022-01-11 DIAGNOSIS — E78.00 PURE HYPERCHOLESTEROLEMIA: ICD-10-CM

## 2022-01-11 DIAGNOSIS — N18.31 CKD STAGE G3A/A2, GFR 45-59 AND ALBUMIN CREATININE RATIO 30-299 MG/G (HCC): ICD-10-CM

## 2022-01-11 DIAGNOSIS — I10 ESSENTIAL HYPERTENSION: ICD-10-CM

## 2022-01-11 DIAGNOSIS — Z95.1 S/P CABG X 3: ICD-10-CM

## 2022-01-11 DIAGNOSIS — D69.6 THROMBOCYTOPENIA (HCC): Primary | ICD-10-CM

## 2022-01-11 DIAGNOSIS — N40.0 BENIGN PROSTATIC HYPERPLASIA WITHOUT LOWER URINARY TRACT SYMPTOMS: ICD-10-CM

## 2022-01-11 DIAGNOSIS — I25.10 CORONARY ARTERY DISEASE INVOLVING NATIVE CORONARY ARTERY OF NATIVE HEART WITHOUT ANGINA PECTORIS: ICD-10-CM

## 2022-01-11 DIAGNOSIS — E11.65 TYPE 2 DIABETES MELLITUS WITH HYPERGLYCEMIA, WITHOUT LONG-TERM CURRENT USE OF INSULIN (HCC): ICD-10-CM

## 2022-01-11 LAB — SL AMB POCT HEMOGLOBIN AIC: 6 (ref ?–6.5)

## 2022-01-11 PROCEDURE — 83036 HEMOGLOBIN GLYCOSYLATED A1C: CPT | Performed by: FAMILY MEDICINE

## 2022-01-11 PROCEDURE — 99214 OFFICE O/P EST MOD 30 MIN: CPT | Performed by: FAMILY MEDICINE

## 2022-01-11 RX ORDER — CLOPIDOGREL BISULFATE 75 MG/1
75 TABLET ORAL EVERY 24 HOURS
Qty: 90 TABLET | Refills: 3 | Status: SHIPPED | OUTPATIENT
Start: 2022-01-11 | End: 2022-03-30 | Stop reason: SDUPTHER

## 2022-01-11 RX ORDER — EZETIMIBE 10 MG/1
10 TABLET ORAL DAILY
Qty: 90 TABLET | Refills: 3 | Status: SHIPPED | OUTPATIENT
Start: 2022-01-11 | End: 2022-04-27 | Stop reason: SDUPTHER

## 2022-01-11 RX ORDER — ATORVASTATIN CALCIUM 80 MG/1
80 TABLET, FILM COATED ORAL DAILY
Qty: 90 TABLET | Refills: 3 | Status: SHIPPED | OUTPATIENT
Start: 2022-01-11 | End: 2022-04-27 | Stop reason: SDUPTHER

## 2022-01-11 RX ORDER — TAMSULOSIN HYDROCHLORIDE 0.4 MG/1
0.4 CAPSULE ORAL EVERY 24 HOURS
Qty: 90 CAPSULE | Refills: 3 | Status: SHIPPED | OUTPATIENT
Start: 2022-01-11

## 2022-01-11 RX ORDER — METOPROLOL SUCCINATE 100 MG/1
100 TABLET, EXTENDED RELEASE ORAL DAILY
Qty: 90 TABLET | Refills: 3 | Status: SHIPPED | OUTPATIENT
Start: 2022-01-11 | End: 2022-04-27 | Stop reason: SDUPTHER

## 2022-01-11 RX ORDER — OLMESARTAN MEDOXOMIL AND HYDROCHLOROTHIAZIDE 40/12.5 40; 12.5 MG/1; MG/1
1 TABLET ORAL DAILY
Qty: 90 TABLET | Refills: 3 | Status: SHIPPED | OUTPATIENT
Start: 2022-01-11 | End: 2022-03-30 | Stop reason: SDUPTHER

## 2022-01-11 RX ORDER — SEMAGLUTIDE 1.34 MG/ML
0.5 INJECTION, SOLUTION SUBCUTANEOUS WEEKLY
Qty: 1.5 ML | Refills: 3 | Status: SHIPPED | OUTPATIENT
Start: 2022-01-11 | End: 2022-04-14

## 2022-01-11 RX ORDER — ASPIRIN 81 MG/1
81 TABLET, CHEWABLE ORAL EVERY 24 HOURS
Qty: 90 TABLET | Refills: 3 | Status: SHIPPED | OUTPATIENT
Start: 2022-01-11 | End: 2022-04-27 | Stop reason: SDUPTHER

## 2022-01-11 NOTE — PROGRESS NOTES
Assessment/Plan:    No problem-specific Assessment & Plan notes found for this encounter  Diagnoses and all orders for this visit:    Thrombocytopenia ([address] )  -     CBC and differential; Future    BMI 35 0-35 9,adult    Type 2 diabetes mellitus with hyperglycemia, without long-term current use of insulin ([facility])  -     POCT hemoglobin A1c  -     Comprehensive metabolic panel; Future  -     Semaglutide,0 25 or 0 5MG/DOS, (Ozempic, 0 25 or 0 5 MG/DOSE,) 2 MG/1 5ML SOPN; Inject 0 5 mg under the skin once a week  -     Ambulatory Referral to Ophthalmology; Future  -     metFORMIN (GLUCOPHAGE) 500 mg tablet; Take 1 tablet (500 mg total) by mouth 2 (two) times a day with meals   CKD stage G3a/A2, GFR 45-59 and albumin creatinine ratio  mg/g ([facility])    Essential hypertension  -     atorvastatin (LIPITOR) 80 mg tablet; Take 1 tablet (80 mg total) by mouth daily  -     metoprolol succinate (TOPROL-XL) 100 mg 24 hr tablet; Take 1 tablet (100 mg total) by mouth daily  -     olmesartan-hydrochlorothiazide (BENICAR HCT) 40-12 5 MG per tablet; Take 1 tablet by mouth daily    Coronary artery disease involving native coronary artery of native heart without angina pectoris  -     aspirin 81 mg chewable tablet; Chew 1 tablet (81 mg total) every 24 hours  -     clopidogrel (Plavix) 75 mg tablet; Take 1 tablet (75 mg total) by mouth every 24 hours    Pure hypercholesterolemia  -     ezetimibe (ZETIA) 10 mg tablet; Take 1 tablet (10 mg total) by mouth daily    S/P CABG x 3    Benign prostatic hyperplasia without lower urinary tract symptoms  -     tamsulosin (Flomax) 0 4 mg; Take 1 capsule (0 4 mg total) by mouth every 24 hours          Subjective:      Patient ID: David Brown is a 77 y o  male      HPI    The following portions of the patient's history were reviewed and updated as appropriate: allergies, current medications, past family history, past medical history, past social history, past surgical history and problem list     Review of Systems   Constitutional: Negative for diaphoresis, fatigue, fever and unexpected weight change  Respiratory: Negative for apnea, cough, choking, chest tightness and shortness of breath  Cardiovascular: Negative for chest pain, palpitations and leg swelling  Gastrointestinal: Negative for abdominal distention, abdominal pain, anal bleeding, blood in stool and constipation  Musculoskeletal: Negative for arthralgias, back pain, gait problem and joint swelling  Neurological: Negative for dizziness, facial asymmetry, light-headedness and headaches  Psychiatric/Behavioral: Negative for behavioral problems, dysphoric mood and self-injury  The patient is not nervous/anxious  Objective:      /80 (BP Location: Left arm, Patient Position: Sitting, Cuff Size: Standard)   Pulse 78   Temp 98 °F (36 7 °C) (Temporal)   Resp 16   Ht 5' 3" (1 6 m)   Wt 89 8 kg (198 lb)   SpO2 96%   BMI 35 07 kg/m²          Physical Exam  Vitals and nursing note reviewed  Neck:      Thyroid: No thyroid mass or thyromegaly  Vascular: No carotid bruit or JVD  Trachea: No tracheal tenderness  Cardiovascular:      Rate and Rhythm: Normal rate and regular rhythm  No extrasystoles are present  Pulses: Normal pulses  Heart sounds: Normal heart sounds  Heart sounds not distant  No friction rub  Pulmonary:      Effort: Pulmonary effort is normal  No tachypnea or bradypnea  Breath sounds: Normal breath sounds  No stridor  Abdominal:      General: Bowel sounds are normal  There is no abdominal bruit  Palpations: Abdomen is soft  There is no hepatomegaly or splenomegaly  Hernia: No hernia is present  Musculoskeletal:         General: Normal range of motion  Cervical back: No edema or rigidity  Skin:     General: Skin is warm and dry  Neurological:      Mental Status: He is oriented to person, place, and time        Deep Tendon Reflexes: Reflexes are normal and symmetric  Psychiatric:         Behavior: Behavior normal          Thought Content: Thought content normal          Judgment: Judgment normal          BMI Counseling: Body mass index is 35 07 kg/m²  The BMI is above normal  Nutrition recommendations include consuming healthier snacks, decreasing soda and/or juice intake, moderation in carbohydrate intake and increasing intake of lean protein  Exercise recommendations include vigorous aerobic physical activity for 75 minutes/week and exercising 3-5 times per week

## 2022-01-12 ENCOUNTER — LAB (OUTPATIENT)
Dept: LAB | Facility: HOSPITAL | Age: 77
End: 2022-01-12
Payer: COMMERCIAL

## 2022-01-12 DIAGNOSIS — E11.65 TYPE 2 DIABETES MELLITUS WITH HYPERGLYCEMIA, WITHOUT LONG-TERM CURRENT USE OF INSULIN (HCC): ICD-10-CM

## 2022-01-12 DIAGNOSIS — D69.6 THROMBOCYTOPENIA (HCC): ICD-10-CM

## 2022-01-12 LAB
ALBUMIN SERPL BCP-MCNC: 3.7 G/DL (ref 3–5.2)
ALP SERPL-CCNC: 110 U/L (ref 43–122)
ALT SERPL W P-5'-P-CCNC: 61 U/L
ANION GAP SERPL CALCULATED.3IONS-SCNC: 3 MMOL/L (ref 5–14)
AST SERPL W P-5'-P-CCNC: 36 U/L (ref 17–59)
BASOPHILS # BLD AUTO: 0 THOUSANDS/ΜL (ref 0–0.1)
BASOPHILS NFR BLD AUTO: 1 % (ref 0–1)
BILIRUB SERPL-MCNC: 0.54 MG/DL
BUN SERPL-MCNC: 15 MG/DL (ref 5–25)
CALCIUM SERPL-MCNC: 8.7 MG/DL (ref 8.4–10.2)
CHLORIDE SERPL-SCNC: 106 MMOL/L (ref 97–108)
CO2 SERPL-SCNC: 31 MMOL/L (ref 22–30)
CREAT SERPL-MCNC: 1.2 MG/DL (ref 0.7–1.5)
EOSINOPHIL # BLD AUTO: 0.2 THOUSAND/ΜL (ref 0–0.4)
EOSINOPHIL NFR BLD AUTO: 4 % (ref 0–6)
ERYTHROCYTE [DISTWIDTH] IN BLOOD BY AUTOMATED COUNT: 14.1 %
GFR SERPL CREATININE-BSD FRML MDRD: 58 ML/MIN/1.73SQ M
GLUCOSE P FAST SERPL-MCNC: 105 MG/DL (ref 70–99)
HCT VFR BLD AUTO: 40.4 % (ref 41–53)
HGB BLD-MCNC: 14.2 G/DL (ref 13.5–17.5)
LYMPHOCYTES # BLD AUTO: 0.7 THOUSANDS/ΜL (ref 0.5–4)
LYMPHOCYTES NFR BLD AUTO: 13 % (ref 25–45)
MCH RBC QN AUTO: 32 PG (ref 26–34)
MCHC RBC AUTO-ENTMCNC: 35.1 G/DL (ref 31–36)
MCV RBC AUTO: 91 FL (ref 80–100)
MONOCYTES # BLD AUTO: 0.6 THOUSAND/ΜL (ref 0.2–0.9)
MONOCYTES NFR BLD AUTO: 11 % (ref 1–10)
NEUTROPHILS # BLD AUTO: 3.7 THOUSANDS/ΜL (ref 1.8–7.8)
NEUTS SEG NFR BLD AUTO: 71 % (ref 45–65)
PLATELET # BLD AUTO: 163 THOUSANDS/UL (ref 150–450)
PMV BLD AUTO: 9.7 FL (ref 8.9–12.7)
POTASSIUM SERPL-SCNC: 4.8 MMOL/L (ref 3.6–5)
PROT SERPL-MCNC: 6.7 G/DL (ref 5.9–8.4)
RBC # BLD AUTO: 4.42 MILLION/UL (ref 4.5–5.9)
SODIUM SERPL-SCNC: 140 MMOL/L (ref 137–147)
WBC # BLD AUTO: 5.2 THOUSAND/UL (ref 4.5–11)

## 2022-01-12 PROCEDURE — 85025 COMPLETE CBC W/AUTO DIFF WBC: CPT

## 2022-01-12 PROCEDURE — 36415 COLL VENOUS BLD VENIPUNCTURE: CPT

## 2022-01-12 PROCEDURE — 80053 COMPREHEN METABOLIC PANEL: CPT

## 2022-01-26 ENCOUNTER — VBI (OUTPATIENT)
Dept: ADMINISTRATIVE | Facility: OTHER | Age: 77
End: 2022-01-26

## 2022-01-26 NOTE — TELEPHONE ENCOUNTER
01/26/22 10:31 AM     See documentation in the [employer] Smart[employer]      Already closed in [place], [address] [place] [address] [place]

## 2022-02-02 ENCOUNTER — VBI (OUTPATIENT)
Dept: ADMINISTRATIVE | Facility: OTHER | Age: 77
End: 2022-02-02

## 2022-02-02 NOTE — TELEPHONE ENCOUNTER
02/02/22 11:50 AM     See documentation in the [employer] Smart[employer]       already closed in [facility]

## 2022-03-30 DIAGNOSIS — I25.10 CORONARY ARTERY DISEASE INVOLVING NATIVE CORONARY ARTERY OF NATIVE HEART WITHOUT ANGINA PECTORIS: ICD-10-CM

## 2022-03-30 DIAGNOSIS — I10 ESSENTIAL HYPERTENSION: ICD-10-CM

## 2022-04-02 RX ORDER — CLOPIDOGREL BISULFATE 75 MG/1
75 TABLET ORAL EVERY 24 HOURS
Qty: 90 TABLET | Refills: 3 | Status: SHIPPED | OUTPATIENT
Start: 2022-04-02 | End: 2022-04-27 | Stop reason: SDUPTHER

## 2022-04-02 RX ORDER — OLMESARTAN MEDOXOMIL AND HYDROCHLOROTHIAZIDE 40/12.5 40; 12.5 MG/1; MG/1
1 TABLET ORAL DAILY
Qty: 90 TABLET | Refills: 3 | Status: SHIPPED | OUTPATIENT
Start: 2022-04-02 | End: 2022-04-27 | Stop reason: SDUPTHER

## 2022-04-14 ENCOUNTER — OFFICE VISIT (OUTPATIENT)
Dept: FAMILY MEDICINE CLINIC | Facility: CLINIC | Age: 77
End: 2022-04-14
Payer: COMMERCIAL

## 2022-04-14 ENCOUNTER — LAB (OUTPATIENT)
Dept: LAB | Facility: HOSPITAL | Age: 77
End: 2022-04-14
Payer: COMMERCIAL

## 2022-04-14 VITALS
DIASTOLIC BLOOD PRESSURE: 70 MMHG | OXYGEN SATURATION: 98 % | WEIGHT: 192 LBS | HEIGHT: 63 IN | TEMPERATURE: 97.8 F | RESPIRATION RATE: 16 BRPM | BODY MASS INDEX: 34.02 KG/M2 | HEART RATE: 76 BPM | SYSTOLIC BLOOD PRESSURE: 130 MMHG

## 2022-04-14 DIAGNOSIS — R79.89 ELEVATED LIVER FUNCTION TESTS: ICD-10-CM

## 2022-04-14 DIAGNOSIS — E11.65 TYPE 2 DIABETES MELLITUS WITH HYPERGLYCEMIA, WITHOUT LONG-TERM CURRENT USE OF INSULIN (HCC): Primary | ICD-10-CM

## 2022-04-14 DIAGNOSIS — E78.00 PURE HYPERCHOLESTEROLEMIA: ICD-10-CM

## 2022-04-14 LAB
CREAT UR-MCNC: 173 MG/DL
LEFT EYE DIABETIC RETINOPATHY: NORMAL
LEFT EYE IMAGE QUALITY: NORMAL
LEFT EYE MACULAR EDEMA: NORMAL
LEFT EYE OTHER RETINOPATHY: NORMAL
MICROALBUMIN UR-MCNC: 21.2 MG/L (ref 0–20)
MICROALBUMIN/CREAT 24H UR: 12 MG/G CREATININE (ref 0–30)
RIGHT EYE DIABETIC RETINOPATHY: NORMAL
RIGHT EYE IMAGE QUALITY: NORMAL
RIGHT EYE MACULAR EDEMA: NORMAL
RIGHT EYE OTHER RETINOPATHY: NORMAL
SEVERITY (EYE EXAM): NORMAL
SL AMB POCT HEMOGLOBIN AIC: 6 (ref ?–6.5)

## 2022-04-14 PROCEDURE — 92250 FUNDUS PHOTOGRAPHY W/I&R: CPT | Performed by: FAMILY MEDICINE

## 2022-04-14 PROCEDURE — 82570 ASSAY OF URINE CREATININE: CPT | Performed by: FAMILY MEDICINE

## 2022-04-14 PROCEDURE — 80061 LIPID PANEL: CPT

## 2022-04-14 PROCEDURE — 83036 HEMOGLOBIN GLYCOSYLATED A1C: CPT | Performed by: FAMILY MEDICINE

## 2022-04-14 PROCEDURE — 82043 UR ALBUMIN QUANTITATIVE: CPT | Performed by: FAMILY MEDICINE

## 2022-04-14 PROCEDURE — 99214 OFFICE O/P EST MOD 30 MIN: CPT | Performed by: FAMILY MEDICINE

## 2022-04-14 PROCEDURE — 80053 COMPREHEN METABOLIC PANEL: CPT

## 2022-04-14 RX ORDER — SEMAGLUTIDE 1.34 MG/ML
1 INJECTION, SOLUTION SUBCUTANEOUS WEEKLY
Qty: 3 ML | Refills: 5 | Status: SHIPPED | OUTPATIENT
Start: 2022-04-14 | End: 2022-04-18 | Stop reason: SDUPTHER

## 2022-04-14 NOTE — PROGRESS NOTES
Assessment/Plan:  1  Type 2 diabetes mellitus with hyperglycemia, without long-term current use of insulin ([facility] [address] )   improved control on diabetes, patient losing weight  Currently on aseptic 0 5 mg weekly  Increasing Ozempic to 1 mg weekly  Encouraged patient exercise  - Microalbumin / creatinine urine ratio  - POCT hemoglobin A1c  - IRIS Diabetic eye exam  - Semaglutide, 1 MG/DOSE, (Ozempic, 1 MG/DOSE,) 2 MG/1 5ML SOPN; Inject 1 mg under the skin once a week for 24 doses  Dispense: 3 mL; Refill: 5    2  Pure hypercholesterolemia    Continue statin therapy  The importance of statin treatment and patient with CAD and diabetes was explained to patient  - Lipid panel; Future    3  Elevated liver function tests    Likely secondary to fatty liver disease, repeat hepatitis panel and CMP  - Comprehensive metabolic panel; Future  - Hepatitis panel, acute; Future    No problem-specific Assessment & Plan notes found for this encounter  Diagnoses and all orders for this visit:    Type 2 diabetes mellitus with hyperglycemia, without long-term current use of insulin ([facility])  -     Microalbumin / creatinine urine ratio  -     POCT hemoglobin A1c  -     IRIS Diabetic eye exam  -     Semaglutide, 1 MG/DOSE, (Ozempic, 1 MG/DOSE,) 2 MG/1 5ML SOPN; Inject 1 mg under the skin once a week for 24 doses    Pure hypercholesterolemia  -     Lipid panel; Future    Elevated liver function tests  -     Comprehensive metabolic panel; Future  -     Hepatitis panel, acute; Future          Subjective:      Patient ID: Clary Lacy is a 69 y o  male  HPI  Patient is here to follow Diabetes and HTN, patient states good compliance with treatment  Denies chest pain, shortness of breath, angina, urinary problems  No exercise but follows low salt and low carbohydrates diet        aspirin  atorvastatin  clopidogrel  ezetimibe  metoprolol succinate  olmesartan-hydrochlorothiazide  Ozempic (1 MG/DOSE) Sopn  tamsulosin         The following portions of the patient's history were reviewed and updated as appropriate: allergies, current medications, past family history, past medical history, past social history, past surgical history and problem list     Review of Systems   Constitutional: Negative for diaphoresis, fatigue, fever and unexpected weight change  Respiratory: Negative for apnea, cough, choking, chest tightness and shortness of breath  Cardiovascular: Negative for chest pain, palpitations and leg swelling  Gastrointestinal: Negative for abdominal distention, abdominal pain, anal bleeding, blood in stool and constipation  Musculoskeletal: Negative for arthralgias, back pain, gait problem and joint swelling  Neurological: Negative for dizziness, facial asymmetry, light-headedness and headaches  Psychiatric/Behavioral: Negative for behavioral problems, dysphoric mood and self-injury  The patient is not nervous/anxious  Objective:      /70 (BP Location: Left arm, Patient Position: Sitting, Cuff Size: Standard)   Pulse 76   Temp 97 8 °F (36 6 °C) (Temporal)   Resp 16   Ht 5' 3" (1 6 m)   Wt 87 1 kg (192 lb)   SpO2 98%   BMI 34 01 kg/m²          Physical Exam  Vitals and nursing note reviewed  Neck:      Thyroid: No thyroid mass or thyromegaly  Vascular: No carotid bruit or JVD  Trachea: No tracheal tenderness  Cardiovascular:      Rate and Rhythm: Normal rate and regular rhythm  No extrasystoles are present  Pulses: Normal pulses  no weak pulses          Dorsalis pedis pulses are 2+ on the right side and 2+ on the left side  Posterior tibial pulses are 2+ on the right side and 2+ on the left side  Heart sounds: Normal heart sounds  Heart sounds not distant  No friction rub  Pulmonary:      Effort: Pulmonary effort is normal  No tachypnea or bradypnea  Breath sounds: Normal breath sounds  No stridor     Abdominal:      General: Bowel sounds are normal  There is no abdominal bruit  Palpations: Abdomen is soft  There is no hepatomegaly or splenomegaly  Hernia: No hernia is present  Musculoskeletal:         General: Normal range of motion  Cervical back: No edema or rigidity  Feet:      Right foot:      Skin integrity: No ulcer, skin breakdown, erythema, warmth, callus or dry skin  Left foot:      Skin integrity: No ulcer, skin breakdown, erythema, warmth, callus or dry skin  Skin:     General: Skin is warm and dry  Neurological:      Mental Status: He is alert and oriented to person, place, and time  Deep Tendon Reflexes: Reflexes are normal and symmetric  Psychiatric:         Behavior: Behavior normal          Thought Content: Thought content normal          Judgment: Judgment normal        Patient's shoes and socks removed  Right Foot/Ankle   Right Foot Inspection  Skin Exam: skin normal and skin intact  No dry skin, no warmth, no callus, no erythema, no maceration, no abnormal color, no pre-ulcer, no ulcer and no callus  Toe Exam: ROM and strength within normal limits  No swelling, no tenderness, erythema and  no right toe deformity    Sensory   Vibration: intact  Proprioception: intact  Monofilament testing: intact    Vascular  Capillary refills: < 3 seconds  The right DP pulse is 2+  The right PT pulse is 2+  Left Foot/Ankle  Left Foot Inspection  Skin Exam: skin normal and skin intact  No dry skin, no warmth, no erythema, no maceration, normal color, no pre-ulcer, no ulcer and no callus  Toe Exam: ROM and strength within normal limits  No swelling, no tenderness, no erythema and no left toe deformity  Sensory   Vibration: intact  Proprioception: intact  Monofilament testing: intact    Vascular  Capillary refills: < 3 seconds  The left DP pulse is 2+  The left PT pulse is 2+       Assign Risk Category  No deformity present  No loss of protective sensation  No weak pulses  Risk: 0

## 2022-04-15 ENCOUNTER — TELEPHONE (OUTPATIENT)
Dept: FAMILY MEDICINE CLINIC | Facility: CLINIC | Age: 77
End: 2022-04-15

## 2022-04-15 ENCOUNTER — APPOINTMENT (OUTPATIENT)
Dept: LAB | Facility: HOSPITAL | Age: 77
End: 2022-04-15
Payer: COMMERCIAL

## 2022-04-15 LAB
ALBUMIN SERPL BCP-MCNC: 4 G/DL (ref 3–5.2)
ALP SERPL-CCNC: 104 U/L (ref 43–122)
ALT SERPL W P-5'-P-CCNC: 34 U/L
ANION GAP SERPL CALCULATED.3IONS-SCNC: 9 MMOL/L (ref 5–14)
AST SERPL W P-5'-P-CCNC: 28 U/L (ref 17–59)
BILIRUB SERPL-MCNC: 0.55 MG/DL
BUN SERPL-MCNC: 23 MG/DL (ref 5–25)
CALCIUM SERPL-MCNC: 8.4 MG/DL (ref 8.4–10.2)
CHLORIDE SERPL-SCNC: 109 MMOL/L (ref 97–108)
CHOLEST SERPL-MCNC: 82 MG/DL
CO2 SERPL-SCNC: 25 MMOL/L (ref 22–30)
CREAT SERPL-MCNC: 1.42 MG/DL (ref 0.7–1.5)
GFR SERPL CREATININE-BSD FRML MDRD: 47 ML/MIN/1.73SQ M
GLUCOSE P FAST SERPL-MCNC: 93 MG/DL (ref 70–99)
HDLC SERPL-MCNC: 26 MG/DL
LDLC SERPL CALC-MCNC: 40 MG/DL
NONHDLC SERPL-MCNC: 56 MG/DL
POTASSIUM SERPL-SCNC: 4.4 MMOL/L (ref 3.6–5)
PROT SERPL-MCNC: 7.1 G/DL (ref 5.9–8.4)
SODIUM SERPL-SCNC: 143 MMOL/L (ref 137–147)
TRIGL SERPL-MCNC: 81 MG/DL

## 2022-04-15 PROCEDURE — 36415 COLL VENOUS BLD VENIPUNCTURE: CPT

## 2022-04-15 PROCEDURE — 80074 ACUTE HEPATITIS PANEL: CPT

## 2022-04-18 DIAGNOSIS — E11.65 TYPE 2 DIABETES MELLITUS WITH HYPERGLYCEMIA, WITHOUT LONG-TERM CURRENT USE OF INSULIN (HCC): ICD-10-CM

## 2022-04-18 LAB
HAV IGM SER QL: NORMAL
HBV CORE IGM SER QL: NORMAL
HBV SURFACE AG SER QL: NORMAL
HCV AB SER QL: NORMAL

## 2022-04-18 RX ORDER — SEMAGLUTIDE 1.34 MG/ML
1 INJECTION, SOLUTION SUBCUTANEOUS WEEKLY
Qty: 3 ML | Refills: 5 | Status: SHIPPED | OUTPATIENT
Start: 2022-04-18 | End: 2022-04-27 | Stop reason: SDUPTHER

## 2022-04-27 DIAGNOSIS — E78.00 PURE HYPERCHOLESTEROLEMIA: ICD-10-CM

## 2022-04-27 DIAGNOSIS — I25.10 CORONARY ARTERY DISEASE INVOLVING NATIVE CORONARY ARTERY OF NATIVE HEART WITHOUT ANGINA PECTORIS: ICD-10-CM

## 2022-04-27 DIAGNOSIS — E11.65 TYPE 2 DIABETES MELLITUS WITH HYPERGLYCEMIA, WITHOUT LONG-TERM CURRENT USE OF INSULIN (HCC): ICD-10-CM

## 2022-04-27 DIAGNOSIS — I10 ESSENTIAL HYPERTENSION: ICD-10-CM

## 2022-04-27 RX ORDER — SEMAGLUTIDE 1.34 MG/ML
1 INJECTION, SOLUTION SUBCUTANEOUS WEEKLY
Qty: 3 ML | Refills: 5 | Status: SHIPPED | OUTPATIENT
Start: 2022-04-27 | End: 2022-10-06

## 2022-04-27 RX ORDER — BLOOD SUGAR DIAGNOSTIC
STRIP MISCELLANEOUS
COMMUNITY
Start: 2022-04-18

## 2022-04-27 RX ORDER — ATORVASTATIN CALCIUM 80 MG/1
80 TABLET, FILM COATED ORAL DAILY
Qty: 90 TABLET | Refills: 3 | Status: SHIPPED | OUTPATIENT
Start: 2022-04-27

## 2022-04-27 RX ORDER — OLMESARTAN MEDOXOMIL AND HYDROCHLOROTHIAZIDE 40/12.5 40; 12.5 MG/1; MG/1
1 TABLET ORAL DAILY
Qty: 90 TABLET | Refills: 3 | Status: SHIPPED | OUTPATIENT
Start: 2022-04-27

## 2022-04-27 RX ORDER — CLOPIDOGREL BISULFATE 75 MG/1
75 TABLET ORAL EVERY 24 HOURS
Qty: 90 TABLET | Refills: 3 | Status: SHIPPED | OUTPATIENT
Start: 2022-04-27

## 2022-04-27 RX ORDER — METOPROLOL SUCCINATE 100 MG/1
100 TABLET, EXTENDED RELEASE ORAL DAILY
Qty: 90 TABLET | Refills: 3 | Status: SHIPPED | OUTPATIENT
Start: 2022-04-27

## 2022-04-27 RX ORDER — EZETIMIBE 10 MG/1
10 TABLET ORAL DAILY
Qty: 90 TABLET | Refills: 3 | Status: SHIPPED | OUTPATIENT
Start: 2022-04-27

## 2022-04-27 RX ORDER — LANCETS 33 GAUGE
EACH MISCELLANEOUS
COMMUNITY
Start: 2022-04-18

## 2022-04-27 RX ORDER — BLOOD-GLUCOSE CONTROL, NORMAL
EACH MISCELLANEOUS
COMMUNITY
Start: 2022-04-18

## 2022-04-27 RX ORDER — SEMAGLUTIDE 1.34 MG/ML
INJECTION, SOLUTION SUBCUTANEOUS
COMMUNITY
Start: 2022-04-14 | End: 2022-04-27 | Stop reason: SDUPTHER

## 2022-04-27 RX ORDER — ASPIRIN 81 MG/1
81 TABLET, CHEWABLE ORAL EVERY 24 HOURS
Qty: 90 TABLET | Refills: 3 | Status: SHIPPED | OUTPATIENT
Start: 2022-04-27

## 2022-06-05 PROBLEM — E66.811 CLASS 1 OBESITY: Status: ACTIVE | Noted: 2022-06-05

## 2022-06-05 PROBLEM — E66.9 CLASS 1 OBESITY: Status: ACTIVE | Noted: 2022-06-05

## 2022-06-10 ENCOUNTER — OFFICE VISIT (OUTPATIENT)
Dept: CARDIOLOGY CLINIC | Facility: CLINIC | Age: 77
End: 2022-06-10
Payer: COMMERCIAL

## 2022-06-10 VITALS
BODY MASS INDEX: 33.13 KG/M2 | SYSTOLIC BLOOD PRESSURE: 124 MMHG | HEART RATE: 80 BPM | WEIGHT: 187 LBS | DIASTOLIC BLOOD PRESSURE: 80 MMHG

## 2022-06-10 DIAGNOSIS — E66.9 CLASS 1 OBESITY: ICD-10-CM

## 2022-06-10 DIAGNOSIS — E78.00 PURE HYPERCHOLESTEROLEMIA: ICD-10-CM

## 2022-06-10 DIAGNOSIS — I25.2 OLD MYOCARDIAL INFARCTION: ICD-10-CM

## 2022-06-10 DIAGNOSIS — Z95.1 S/P CABG X 3: ICD-10-CM

## 2022-06-10 DIAGNOSIS — I25.10 CORONARY ARTERY DISEASE INVOLVING NATIVE CORONARY ARTERY OF NATIVE HEART WITHOUT ANGINA PECTORIS: Primary | ICD-10-CM

## 2022-06-10 DIAGNOSIS — E11.65 TYPE 2 DIABETES MELLITUS WITH HYPERGLYCEMIA, WITHOUT LONG-TERM CURRENT USE OF INSULIN (HCC): ICD-10-CM

## 2022-06-10 DIAGNOSIS — N18.31 CKD STAGE G3A/A2, GFR 45-59 AND ALBUMIN CREATININE RATIO 30-299 MG/G (HCC): ICD-10-CM

## 2022-06-10 DIAGNOSIS — I10 PRIMARY HYPERTENSION: ICD-10-CM

## 2022-06-10 PROCEDURE — 99214 OFFICE O/P EST MOD 30 MIN: CPT | Performed by: INTERNAL MEDICINE

## 2022-06-10 NOTE — PROGRESS NOTES
[facility]  [address] [place] [address]  Phone#  [phone]   Fax#  [phone]  *-*-*-*-*-*-*-*-*-*-*-*-*-*-*-*-*-*-*-*-*-*-*-*-*-*-*-*-*-*-*-*-*-*-*-*-*-*-*-*-*-*-*-*-*-*-*-*-*-*-*-*-*-*                                   Cardiology Follow Up      ENCOUNTER DATE: 06/10/22 9:41 AM  PATIENT NAME: Agueda Rao   : 1945    MRN: [id number]  AGE:77 y o  SEX: male  [address] Tarsha Renae MD     PRIMARY CARE PHYSICIAN: Lidia Morrell MD    ACTIVE DIAGNOSIS THIS VISIT  1  Coronary artery disease involving native coronary artery of native heart without angina pectoris     2  Old myocardial infarction     3  Pure hypercholesterolemia     4  Primary hypertension     5  Type 2 diabetes mellitus with hyperglycemia, without long-term current use of insulin ([facility])     6  S/P CABG x 3       7  CKD stage G3a/A2, GFR 45-59 and albumin creatinine ratio  mg/g ([facility])     8  Class 1 obesity       ACTIVE PROBLEM LIST  Patient Active Problem List   Diagnosis    CAD (coronary artery disease)    Diverticulosis    Hypertension    Hyperlipidemia    Old myocardial infarction    S/P CABG x 3      Thrombocytopenia (HCC)    Type 2 diabetes mellitus with hyperglycemia, without long-term current use of insulin ([facility] [address] )    Medicare annual wellness visit, subsequent    CKD stage G3a/A2, GFR 45-59 and albumin creatinine ratio  mg/g ([facility])    Need for shingles vaccine    Class 1 obesity       CARDIOLOGY SPECIALTY COMMENTS  Patient was 1st seen in our office on 2013 for midsternal chest discomfort and dyspnea on exertion  His electrocardiogram demonstrated old inferior wall myocardial infarction with possible lateral extension  Other diagnoses include hypertension, metabolic syndrome, hyperlipidemia  Cardiac catheterization demonstrated a 70-80% left anterior after the 2nd diagonal branch  Circumflex artery minor luminal irregularities   Right coronary artery dominant in distribution with a proximal 90% lesion  Patient underwent coronary artery bypass surgery with a LIMA to the LAD, SVG to the right coronary artery, SVG to the OM branch of the circumflex  03/19/2013 echocardiogram: Normal left ventricular systolic and diastolic function  Mildly elevated left ventricular filling pressures  Aortic sclerosis with mild aortic regurgitation  Moderately severe concentric left ventricular hypertrophy  10/04/2018 echocardiogram:  Normal left ventricular systolic function EF 72%  Grade 3 diastolic left ventricular dysfunction  Moderate concentric left ventricular hypertrophy  Biatrial enlargement  Aortic sclerosis  INTERVAL HISTORY:        Patient has no cardiac complaints  Patient denies chest discomfort or shortness of breath  Patient has no palpitations  Patient denies symptoms of dizziness, lightheadedness or near-syncope/syncope  Patient denies leg edema  Patient denies symptoms of orthopnea or paroxysmal nocturnal dyspnea  His blood pressure is excellent  He had a lipid profile from April 15, 2022 which was also excellent except for low HDL  He has lost 17 lb since last September  With increased weight loss and hopefully an exercise program, I would expect his HDL to improved  DISCUSSION/PLAN:          1  Recommend walking briskly a 1/2 hour a day 5 days a week  2   Return in 6 months    Lab Studies:    Lab Results   Component Value Date    CHOLESTEROL 82 04/15/2022    CHOLESTEROL 104 09/09/2021    CHOLESTEROL 90 02/04/2021     Lab Results   Component Value Date    TRIG 81 04/15/2022    TRIG 115 09/09/2021    TRIG 72 02/04/2021     Lab Results   Component Value Date    HDL 26 (L) 04/15/2022    HDL 30 (L) 09/09/2021    HDL 25 (L) 02/04/2021     Lab Results   Component Value Date    LDLCALC 40 04/15/2022    LDLCALC 51 09/09/2021    LDLCALC 51 02/04/2021     Lab Results   Component Value Date    HGBA1C 6 0 04/14/2022      Lab Results   Component Value Date    EGFR 47 04/15/2022    EGFR 58 01/12/2022    EGFR 53 (L) 05/05/2021    SODIUM 143 04/15/2022    SODIUM 140 01/12/2022    SODIUM 140 05/05/2021    K 4 4 04/15/2022    K 4 8 01/12/2022    K 4 1 05/05/2021     (H) 04/15/2022     01/12/2022     05/05/2021    CO2 25 04/15/2022    CO2 31 (H) 01/12/2022    CO2 24 05/05/2021    ANIONGAP 11 04/27/2018    BUN 23 04/15/2022    BUN 15 01/12/2022    BUN 21 05/05/2021    CREATININE 1 42 04/15/2022    CREATININE 1 20 01/12/2022    CREATININE 1 31 05/05/2021     Lab Results   Component Value Date    WBC 5 20 01/12/2022    WBC 5 70 05/05/2021    WBC 6 30 07/16/2020    HGB 14 2 01/12/2022    HGB 14 6 05/05/2021    HGB 14 9 07/16/2020    HCT 40 4 (L) 01/12/2022    HCT 43 0 05/05/2021    HCT 43 7 07/16/2020    MCV 91 01/12/2022    MCV 94 05/05/2021    MCV 94 07/16/2020    MCH 32 0 01/12/2022    MCH 32 0 05/05/2021    MCH 32 1 07/16/2020    MCHC 35 1 01/12/2022    MCHC 34 0 05/05/2021    MCHC 34 2 07/16/2020     01/12/2022     (L) 05/05/2021     (L) 07/16/2020      Lab Results   Component Value Date    GLUCOSE 90 04/27/2018    CALCIUM 8 4 04/15/2022    CALCIUM 8 7 01/12/2022    CALCIUM 9 0 05/05/2021    AST 28 04/15/2022    AST 36 01/12/2022    AST 34 05/05/2021    ALT 34 04/15/2022    ALT 61 (H) 01/12/2022    ALT 54 (H) 05/05/2021    ALKPHOS 104 04/15/2022    ALKPHOS 110 01/12/2022    ALKPHOS 117 05/05/2021    PROT 6 6 04/27/2018    BILITOT 0 8 04/27/2018    MG 2 0 11/12/2018     No results found for this visit on 06/10/22        Current Outpatient Medications:     Alcohol Swabs (Comfort Touch Alcohol Prep) 70 % PADS, TEST 3-4 TIMES A DAY AS DIRECTED, Disp: , Rfl:     aspirin 81 mg chewable tablet, Chew 1 tablet (81 mg total) every 24 hours, Disp: 90 tablet, Rfl: 3    atorvastatin (LIPITOR) 80 mg tablet, Take 1 tablet (80 mg total) by mouth daily, Disp: 90 tablet, Rfl: 3    Blood Glucose Calibration (OT ULTRA/FASTTK CNTRL SOLN) SOLN, Use as directed, Disp: , Rfl:     clopidogrel (Plavix) 75 mg tablet, Take 1 tablet (75 mg total) by mouth every 24 hours, Disp: 90 tablet, Rfl: 3    Comfort EZ Pen Needles 32G X 4 MM MISC, TEST 3-4 TIMES A DAY AS DIRECTED, Disp: , Rfl:     ezetimibe (ZETIA) 10 mg tablet, Take 1 tablet (10 mg total) by mouth daily, Disp: 90 tablet, Rfl: 3    metFORMIN (GLUCOPHAGE) 500 mg tablet, Take 1 tablet (500 mg total) by mouth 2 (two) times a day with meals   , Disp: 180 tablet, Rfl: 3    metoprolol succinate (TOPROL-XL) 100 mg 24 hr tablet, Take 1 tablet (100 mg total) by mouth daily, Disp: 90 tablet, Rfl: 3    olmesartan-hydrochlorothiazide (BENICAR HCT) 40-12 5 MG per tablet, Take 1 tablet by mouth daily, Disp: 90 tablet, Rfl: 3    OneTouch Ultra test strip, TEST 3-4 TIMES A DAY AS DIRECTED, Disp: , Rfl:     Semaglutide, 1 MG/DOSE, (Ozempic, 1 MG/DOSE,) 2 MG/1 5ML SOPN, Inject 1 mg under the skin once a week for 24 doses, Disp: 3 mL, Rfl: 5    tamsulosin (Flomax) 0 4 mg, Take 1 capsule (0 4 mg total) by mouth every 24 hours, Disp: 90 capsule, Rfl: 3  Allergies   Allergen Reactions    No Active Allergies        Past Medical History:   Diagnosis Date    CAD (coronary artery disease)     Coronary artery disease     Diabetes mellitus ([facility] [address] )     Diverticulosis     Hyperglycemia     Hyperlipidemia     Hypertension     Obesity      Social History     Socioeconomic History    Marital status: /Civil Union     Spouse name: Not on file    Number of children: Not on file    Years of education: Not on file    Highest education level: Not on file   Occupational History    Not on file   Tobacco Use    Smoking status: Former Smoker     Types: Cigarettes     Quit date: 1/1/2012     Years since quitting: 10 4    Smokeless tobacco: Never Used   Substance and Sexual Activity    Alcohol use:  Yes    Drug use: No    Sexual activity: Yes     Partners: Female   Other Topics Concern    Not on file   Social History Narrative    Not on file     Social Determinants of Health     Financial Resource Strain: Not on file   Food Insecurity: Not on file   Transportation Needs: Not on file   Physical Activity: Not on file   Stress: Not on file   Social Connections: Not on file   Intimate Partner Violence: Not on file   Housing Stability: Not on file      Family History   Problem Relation Age of Onset    Hypertension Mother      Past Surgical History:   Procedure Laterality Date    CARDIOVASCULAR STRESS TEST  03/20/2013    COLONOSCOPY      4/8/2020:diverticulosis    COLONOSCOPY W/ POLYPECTOMY      2/6/17:colon,polyp      CORONARY ARTERY BYPASS GRAFT  01/21/2016    x3       PREVIOUS WEIGHTS:   Wt Readings from Last 10 Encounters:   06/10/22 84 8 kg (187 lb)   04/14/22 87 1 kg (192 lb)   01/11/22 89 8 kg (198 lb)   11/22/21 88 5 kg (195 lb 3 2 oz)   09/07/21 92 5 kg (204 lb)   05/04/21 92 2 kg (203 lb 3 2 oz)   05/03/21 91 2 kg (201 lb)   01/19/21 89 8 kg (198 lb)   11/02/20 88 8 kg (195 lb 12 8 oz)   10/19/20 90 3 kg (199 lb)        Review of Systems:  Review of Systems   Constitutional: Negative for activity change  Respiratory: Negative for cough, chest tightness, shortness of breath and wheezing  Cardiovascular: Negative for chest pain, palpitations and leg swelling  Musculoskeletal: Negative for gait problem  Skin: Negative for color change  Neurological: Negative for dizziness, tremors, syncope, weakness, light-headedness and headaches  Psychiatric/Behavioral: Negative for agitation and confusion  Physical Exam:  /80 (BP Location: Left arm, Patient Position: Sitting, Cuff Size: Large)   Pulse 80   Wt 84 8 kg (187 lb)   BMI 33 13 kg/m²     Physical Exam  Vitals reviewed  Constitutional:       General: He is not in acute distress  Appearance: He is well-developed  HENT:      Head: Normocephalic and atraumatic  Neck:      Thyroid: No thyromegaly  Vascular: No carotid bruit or JVD  Trachea: No tracheal deviation  Cardiovascular:      Rate and Rhythm: Normal rate and regular rhythm  Pulses: Normal pulses  Heart sounds: Normal heart sounds  No murmur heard  No friction rub  No gallop  Pulmonary:      Effort: Pulmonary effort is normal  No respiratory distress  Breath sounds: Normal breath sounds  No wheezing, rhonchi or rales  Chest:      Chest wall: No tenderness  Musculoskeletal:      Cervical back: Normal range of motion and neck supple  Right lower leg: No edema  Left lower leg: No edema  Skin:     General: Skin is warm and dry  Neurological:      General: No focal deficit present  Mental Status: He is alert and oriented to person, place, and time  Psychiatric:         Mood and Affect: Mood normal          Behavior: Behavior normal          Thought Content: Thought content normal          Judgment: Judgment normal        ======================================================  Imaging:   I have personally reviewed pertinent reports  Portions of the record may have been created with voice recognition software  Occasional wrong word or "sound a like" substitutions may have occurred due to the inherent limitations of voice recognition software  Read the chart carefully and recognize, using context, where substitutions have occurred      SIGNATURES:   Charles Viveros MD

## 2022-08-14 DIAGNOSIS — E11.65 TYPE 2 DIABETES MELLITUS WITH HYPERGLYCEMIA, WITHOUT LONG-TERM CURRENT USE OF INSULIN (HCC): ICD-10-CM

## 2022-08-15 RX ORDER — SEMAGLUTIDE 1.34 MG/ML
1 INJECTION, SOLUTION SUBCUTANEOUS WEEKLY
Qty: 3 ML | Refills: 5 | Status: SHIPPED | OUTPATIENT
Start: 2022-08-15 | End: 2022-08-17

## 2022-09-14 DIAGNOSIS — N40.0 BENIGN PROSTATIC HYPERPLASIA WITHOUT LOWER URINARY TRACT SYMPTOMS: ICD-10-CM

## 2022-09-14 RX ORDER — TAMSULOSIN HYDROCHLORIDE 0.4 MG/1
CAPSULE ORAL
Qty: 90 CAPSULE | Refills: 3 | Status: SHIPPED | OUTPATIENT
Start: 2022-09-14

## 2022-10-03 ENCOUNTER — CLINICAL SUPPORT (OUTPATIENT)
Dept: FAMILY MEDICINE CLINIC | Facility: CLINIC | Age: 77
End: 2022-10-03
Payer: COMMERCIAL

## 2022-10-03 VITALS — TEMPERATURE: 97.9 F

## 2022-10-03 DIAGNOSIS — Z23 NEEDS FLU SHOT: Primary | ICD-10-CM

## 2022-10-03 PROCEDURE — G0008 ADMIN INFLUENZA VIRUS VAC: HCPCS | Performed by: FAMILY MEDICINE

## 2022-10-03 PROCEDURE — 90662 IIV NO PRSV INCREASED AG IM: CPT | Performed by: FAMILY MEDICINE

## 2022-10-12 PROBLEM — Z00.00 MEDICARE ANNUAL WELLNESS VISIT, SUBSEQUENT: Status: RESOLVED | Noted: 2020-07-13 | Resolved: 2022-10-12

## 2022-10-19 ENCOUNTER — OFFICE VISIT (OUTPATIENT)
Dept: FAMILY MEDICINE CLINIC | Facility: CLINIC | Age: 77
End: 2022-10-19
Payer: COMMERCIAL

## 2022-10-19 VITALS
TEMPERATURE: 97.9 F | HEIGHT: 63 IN | WEIGHT: 188 LBS | HEART RATE: 72 BPM | RESPIRATION RATE: 16 BRPM | DIASTOLIC BLOOD PRESSURE: 80 MMHG | BODY MASS INDEX: 33.31 KG/M2 | SYSTOLIC BLOOD PRESSURE: 150 MMHG | OXYGEN SATURATION: 97 %

## 2022-10-19 DIAGNOSIS — E11.65 TYPE 2 DIABETES MELLITUS WITH HYPERGLYCEMIA, WITHOUT LONG-TERM CURRENT USE OF INSULIN (HCC): ICD-10-CM

## 2022-10-19 DIAGNOSIS — Z97.4 USES HEARING AID: ICD-10-CM

## 2022-10-19 DIAGNOSIS — Z00.00 MEDICARE ANNUAL WELLNESS VISIT, SUBSEQUENT: Primary | ICD-10-CM

## 2022-10-19 PROCEDURE — 99214 OFFICE O/P EST MOD 30 MIN: CPT | Performed by: FAMILY MEDICINE

## 2022-10-19 PROCEDURE — G0439 PPPS, SUBSEQ VISIT: HCPCS | Performed by: FAMILY MEDICINE

## 2022-10-19 NOTE — PROGRESS NOTES
Assessment and Plan:     Problem List Items Addressed This Visit     Type 2 diabetes mellitus with hyperglycemia, without long-term current use of insulin ([facility])    Relevant Orders    Comprehensive metabolic panel    Hemoglobin A1C      Other Visit Diagnoses     Medicare annual wellness visit, subsequent    -  Primary    Relevant Orders    Lipid panel    Uses hearing aid bilaterally        BMI 33 0-33 9,adult               Preventive health issues were discussed with patient, and age appropriate screening tests were ordered as noted in patient's After Visit Summary  Personalized health advice and appropriate referrals for health education or preventive services given if needed, as noted in patient's After Visit Summary       History of Present Illness:     Patient presents for a Medicare Wellness Visit    HPI   Patient Care Team:  Lorna Carter MD as PCP - General (Family Medicine)     Review of Systems:     Review of Systems     Problem List:     Patient Active Problem List   Diagnosis   • CAD (coronary artery disease)   • Diverticulosis   • Hypertension   • Hyperlipidemia   • Old myocardial infarction   • S/P CABG x 3  2016   • Thrombocytopenia ([facility])   • Type 2 diabetes mellitus with hyperglycemia, without long-term current use of insulin ([address] )   • CKD stage G3a/A2, GFR 45-59 and albumin creatinine ratio  mg/g ([facility])   • Need for shingles vaccine   • Class 1 obesity      Past Medical and Surgical History:     Past Medical History:   Diagnosis Date   • CAD (coronary artery disease)    • Coronary artery disease    • Diabetes mellitus ([address] )    • Diverticulosis    • Hyperglycemia    • Hyperlipidemia    • Hypertension    • Obesity      Past Surgical History:   Procedure Laterality Date   • CARDIOVASCULAR STRESS TEST  03/20/2013   • COLONOSCOPY      4/8/2020:diverticulosis   • COLONOSCOPY W/ POLYPECTOMY      2/6/17:colon,polyp     • CORONARY ARTERY BYPASS GRAFT  01/21/2016    x3      Family History:     Family History   Problem Relation Age of Onset   • Hypertension Mother       Social History:     Social History     Socioeconomic History   • Marital status: /Civil Union     Spouse name: None   • Number of children: None   • Years of education: None   • Highest education level: None   Occupational History   • None   Tobacco Use   • Smoking status: Former Smoker     Types: Cigarettes     Quit date: 1/1/2012     Years since quitting: 10 8   • Smokeless tobacco: Never Used   Substance and Sexual Activity   • Alcohol use: Yes   • Drug use: No   • Sexual activity: Yes     Partners: Female   Other Topics Concern   • None   Social History Narrative   • None     Social Determinants of Health     Financial Resource Strain: Low Risk    • Difficulty of Paying Living Expenses: Not hard at all   Food Insecurity: Not on file   Transportation Needs: No Transportation Needs   • Lack of Transportation (Medical): No   • Lack of Transportation (Non-Medical):  No   Physical Activity: Not on file   Stress: Not on file   Social Connections: Not on file   Intimate Partner Violence: Not on file   Housing Stability: Not on file      Medications and Allergies:     Current Outpatient Medications   Medication Sig Dispense Refill   • Alcohol Swabs (Comfort Touch Alcohol Prep) 70 % PADS TEST 3-4 TIMES A DAY AS DIRECTED     • aspirin 81 mg chewable tablet Chew 1 tablet (81 mg total) every 24 hours 90 tablet 3   • atorvastatin (LIPITOR) 80 mg tablet Take 1 tablet (80 mg total) by mouth daily 90 tablet 3   • Blood Glucose Calibration (OT ULTRA/FASTTK CNTRL SOLN) SOLN Use as directed     • clopidogrel (Plavix) 75 mg tablet Take 1 tablet (75 mg total) by mouth every 24 hours 90 tablet 3   • Comfort EZ Pen Needles 32G X 4 MM MISC TEST 3-4 TIMES A DAY AS DIRECTED     • ezetimibe (ZETIA) 10 mg tablet Take 1 tablet (10 mg total) by mouth daily 90 tablet 3   • metFORMIN (GLUCOPHAGE) 500 mg tablet Take 1 tablet (500 mg total) by mouth 2 (two) times a day with meals   180 tablet 3   • metoprolol succinate (TOPROL-XL) 100 mg 24 hr tablet Take 1 tablet (100 mg total) by mouth daily 90 tablet 3   • olmesartan-hydrochlorothiazide (BENICAR HCT) 40-12 5 MG per tablet Take 1 tablet by mouth daily 90 tablet 3   • OneTouch Ultra test strip TEST 3-4 TIMES A DAY AS DIRECTED     • Ozempic, 1 MG/DOSE, 4 MG/3ML SOPN injection pen INJECT 1 MG UNDER THE SKIN ONCE A WEEK FOR 24 DOSES 3 mL 5   • tamsulosin (FLOMAX) 0 4 mg TAKE ONE CAPSULE BY MOUTH DAILY 90 capsule 3     No current facility-administered medications for this visit  Allergies   Allergen Reactions   • No Active Allergies       Immunizations:     Immunization History   Administered Date(s) Administered   • COVID-19 PFIZER VACCINE 0 3 ML IM 03/11/2021, 04/08/2021, 12/18/2021   • H1N1, All Formulations 10/22/2009   • INFLUENZA 10/14/2015, 10/14/2015, 11/09/2018, 10/03/2022   • Influenza Split 10/30/2013   • Influenza, high dose seasonal 0 7 mL 11/09/2018, 11/04/2019, 09/25/2020, 09/30/2021, 10/03/2022   • Influenza, seasonal, injectable 09/21/2011, 09/25/2012, 10/23/2014   • Pneumococcal Conjugate 13-Valent 07/29/2019   • Pneumococcal Polysaccharide PPV23 02/02/2012, 04/19/2016   • Tdap 04/19/2016   • Zoster 05/14/2012      Health Maintenance:         Topic Date Due   • Colorectal Cancer Screening  04/08/2023   • Hepatitis C Screening  Completed         Topic Date Due   • COVID-19 Vaccine (4 - Booster for Pfizer series) 04/18/2022      Medicare Screening Tests and Risk Assessments:     Neel is here for his Subsequent Wellness visit  Last Medicare Wellness visit information reviewed, patient interviewed and updates made to the record today  Health Risk Assessment:   Patient rates overall health as good  Patient feels that their physical health rating is same  Patient is satisfied with their life  Eyesight was rated as same  Hearing was rated as same   Patient feels that their emotional and mental health rating is same  Patients states they are never, rarely angry  Patient states they are sometimes unusually tired/fatigued  Pain experienced in the last 7 days has been some  Patient's pain rating has been 2/10  Patient states that he has experienced no weight loss or gain in last 6 months  Fall Risk Screening: In the past year, patient has experienced: no history of falling in past year      Home Safety:  Patient does not have trouble with stairs inside or outside of their home  Patient has working smoke alarms and has working carbon monoxide detector  Home safety hazards include: none  Nutrition:   Current diet is Diabetic  Medications:   Patient is currently taking over-the-counter supplements  OTC medications include: see medication list  Patient is able to manage medications  Activities of Daily Living (ADLs)/Instrumental Activities of Daily Living (IADLs):   Walk and transfer into and out of bed and chair?: Yes  Dress and groom yourself?: Yes    Bathe or shower yourself?: Yes    Feed yourself?  Yes  Do your laundry/housekeeping?: Yes  Manage your money, pay your bills and track your expenses?: Yes  Make your own meals?: Yes    Do your own shopping?: Yes    Previous Hospitalizations:   Any hospitalizations or ED visits within the last 12 months?: Yes    How many hospitalizations have you had in the last year?: 1-2    Advance Care Planning:   Living will: No    Durable POA for healthcare: No    Advanced directive: No    Advanced directive counseling given: Yes    Five wishes given: Yes    Patient declined ACP directive: No    End of Life Decisions reviewed with patient: Yes    Provider agrees with end of life decisions: Yes      Cognitive Screening:   Provider or family/friend/caregiver concerned regarding cognition?: No    PREVENTIVE SCREENINGS      Cardiovascular Screening:    General: Screening Not Indicated and History Lipid Disorder    Due for: Lipid Panel      Diabetes Screening:     General: Screening Not Indicated and History Diabetes    Due for: Blood Glucose      Colorectal Cancer Screening:     General: Screening Current      Prostate Cancer Screening:    General: Screening Not Indicated      Osteoporosis Screening:    General: Risks and Benefits Discussed    Due for: DXA Axial      Abdominal Aortic Aneurysm (AAA) Screening:    Risk factors include: tobacco use        General: Screening Not Indicated      Lung Cancer Screening:     General: Screening Not Indicated      Hepatitis C Screening:    General: Screening Current    Hep C Screening Accepted: Yes      Screening, Brief Intervention, and Referral to Treatment (SBIRT)    Screening  Typical number of drinks in a day: 0  Typical number of drinks in a week: 0  Interpretation: Low risk drinking behavior  Single Item Drug Screening:  How often have you used an illegal drug (including marijuana) or a prescription medication for non-medical reasons in the past year? never    Single Item Drug Screen Score: 0  Interpretation: Negative screen for possible drug use disorder    Brief Intervention  Alcohol & drug use screenings were reviewed  No concerns regarding substance use disorder identified  Drinks 1 glass of wine every 3 months    Other Counseling Topics:   Alcohol use counseling, car/seat belt/driving safety and calcium and vitamin D intake and regular weightbearing exercise       No exam data present     Physical Exam:     /80 (BP Location: Left arm, Patient Position: Sitting, Cuff Size: Standard)   Pulse 72   Temp 97 9 °F (36 6 °C) (Temporal)   Resp 16   Ht 5' 3" (1 6 m)   Wt 85 3 kg (188 lb)   SpO2 97%   BMI 33 30 kg/m²     Physical Exam     Angelito Wing MD

## 2022-10-19 NOTE — PATIENT INSTRUCTIONS
Medicare Preventive Visit Patient Instructions  Thank you for completing your Welcome to Medicare Visit or Medicare Annual Wellness Visit today  Your next wellness visit will be due in one year (10/20/2023)  The screening/preventive services that you may require over the next 5-10 years are detailed below  Some tests may not apply to you based off risk factors and/or age  Screening tests ordered at today's visit but not completed yet may show as past due  Also, please note that scanned in results may not display below  Preventive Screenings:  Service Recommendations Previous Testing/Comments   Colorectal Cancer Screening  · Colonoscopy    · Fecal Occult Blood Test (FOBT)/Fecal Immunochemical Test (FIT)  · Fecal DNA/Cologuard Test  · Flexible Sigmoidoscopy Age: 39-70 years old   Colonoscopy: every 10 years (May be performed more frequently if at higher risk)  OR  FOBT/FIT: every 1 year  OR  Cologuard: every 3 years  OR  Sigmoidoscopy: every 5 years  Screening may be recommended earlier than age 39 if at higher risk for colorectal cancer  Also, an individualized decision between you and your healthcare provider will decide whether screening between the ages of 74-80 would be appropriate   Colonoscopy: 04/08/2020  FOBT/FIT: Not on file  Cologuard: Not on file  Sigmoidoscopy: Not on file    Screening Current     Prostate Cancer Screening Individualized decision between patient and health care provider in men between ages of 53-78   Medicare will cover every 12 months beginning on the day after your 50th birthday PSA: 2 6 ng/mL     Screening Not Indicated     Hepatitis C Screening Once for adults born between 1945 and 1965  More frequently in patients at high risk for Hepatitis C Hep C Antibody: 04/15/2022    Screening Current   Diabetes Screening 1-2 times per year if you're at risk for diabetes or have pre-diabetes Fasting glucose: 93 mg/dL (4/15/2022)  A1C: 6 0 (4/14/2022)  Screening Not Indicated  History Diabetes Cholesterol Screening Once every 5 years if you don't have a lipid disorder  May order more often based on risk factors  Lipid panel: 04/15/2022  Screening Not Indicated  History Lipid Disorder      Other Preventive Screenings Covered by Medicare:  1  Abdominal Aortic Aneurysm (AAA) Screening: covered once if your at risk  You're considered to be at risk if you have a family history of AAA or a male between the age of 73-68 who smoking at least 100 cigarettes in your lifetime  2  Lung Cancer Screening: covers low dose CT scan once per year if you meet all of the following conditions: (1) Age 50-69; (2) No signs or symptoms of lung cancer; (3) Current smoker or have quit smoking within the last 15 years; (4) You have a tobacco smoking history of at least 20 pack years (packs per day x number of years you smoked); (5) You get a written order from a healthcare provider  3  Glaucoma Screening: covered annually if you're considered high risk: (1) You have diabetes OR (2) Family history of glaucoma OR (3)  aged 48 and older OR (3)  American aged 72 and older  3  Osteoporosis Screening: covered every 2 years if you meet one of the following conditions: (1) Have a vertebral abnormality; (2) On glucocorticoid therapy for more than 3 months; (3) Have primary hyperparathyroidism; (4) On osteoporosis medications and need to assess response to drug therapy  5  HIV Screening: covered annually if you're between the age of 12-76  Also covered annually if you are younger than 13 and older than 72 with risk factors for HIV infection  For pregnant patients, it is covered up to 3 times per pregnancy      Immunizations:  Immunization Recommendations   Influenza Vaccine Annual influenza vaccination during flu season is recommended for all persons aged >= 6 months who do not have contraindications   Pneumococcal Vaccine   * Pneumococcal conjugate vaccine = PCV13 (Prevnar 13), PCV15 (Vaxneuvance), PCV20 (Prevnar 20)  * Pneumococcal polysaccharide vaccine = PPSV23 (Pneumovax) Adults 2364 years old: 1-3 doses may be recommended based on certain risk factors  Adults 72 years old: 1-2 doses may be recommended based off what pneumonia vaccine you previously received   Hepatitis B Vaccine 3 dose series if at intermediate or high risk (ex: diabetes, end stage renal disease, liver disease)   Tetanus (Td) Vaccine - COST NOT COVERED BY MEDICARE PART B Following completion of primary series, a booster dose should be given every 10 years to maintain immunity against tetanus  Td may also be given as tetanus wound prophylaxis  Tdap Vaccine - COST NOT COVERED BY MEDICARE PART B Recommended at least once for all adults  For pregnant patients, recommended with each pregnancy  Shingles Vaccine (Shingrix) - COST NOT COVERED BY MEDICARE PART B  2 shot series recommended in those aged 48 and above     Health Maintenance Due:      Topic Date Due   • Colorectal Cancer Screening  04/08/2023   • Hepatitis C Screening  Completed     Immunizations Due:      Topic Date Due   • COVID-19 Vaccine (4 - Booster for Quiros Peter series) 04/18/2022     Advance Directives   What are advance directives? Advance directives are legal documents that state your wishes and plans for medical care  These plans are made ahead of time in case you lose your ability to make decisions for yourself  Advance directives can apply to any medical decision, such as the treatments you want, and if you want to donate organs  What are the types of advance directives? There are many types of advance directives, and each state has rules about how to use them  You may choose a combination of any of the following:  · Living will: This is a written record of the treatment you want  You can also choose which treatments you do not want, which to limit, and which to stop at a certain time  This includes surgery, medicine, IV fluid, and tube feedings     · Durable power of  for [facility]): This is a written record that states who you want to make healthcare choices for you when you are unable to make them for yourself  This person, called a proxy, is usually a family member or a friend  You may choose more than 1 proxy  · Do not resuscitate (DNR) order:  A DNR order is used in case your heart stops beating or you stop breathing  It is a request not to have certain forms of treatment, such as CPR  A DNR order may be included in other types of advance directives  · Medical directive: This covers the care that you want if you are in a coma, near death, or unable to make decisions for yourself  You can list the treatments you want for each condition  Treatment may include pain medicine, surgery, blood transfusions, dialysis, IV or tube feedings, and a ventilator (breathing machine)  · Values history: This document has questions about your views, beliefs, and how you feel and think about life  This information can help others choose the care that you would choose  Why are advance directives important? An advance directive helps you control your care  Although spoken wishes may be used, it is better to have your wishes written down  Spoken wishes can be misunderstood, or not followed  Treatments may be given even if you do not want them  An advance directive may make it easier for your family to make difficult choices about your care  Weight Management   Why it is important to manage your weight:  Being overweight increases your risk of health conditions such as heart disease, high blood pressure, type 2 diabetes, and certain types of cancer  It can also increase your risk for osteoarthritis, sleep apnea, and other respiratory problems  Aim for a slow, steady weight loss  Even a small amount of weight loss can lower your risk of health problems  How to lose weight safely:  A safe and healthy way to lose weight is to eat fewer calories and get regular exercise   You can lose up about 1 pound a week by decreasing the number of calories you eat by 500 calories each day  Healthy meal plan for weight management:  A healthy meal plan includes a variety of foods, contains fewer calories, and helps you stay healthy  A healthy meal plan includes the following:  · Eat whole-grain foods more often  A healthy meal plan should contain fiber  Fiber is the part of grains, fruits, and vegetables that is not broken down by your body  Whole-grain foods are healthy and provide extra fiber in your diet  Some examples of whole-grain foods are whole-wheat breads and pastas, oatmeal, brown rice, and bulgur  · Eat a variety of vegetables every day  Include dark, leafy greens such as spinach, kale, qian greens, and mustard greens  Eat yellow and orange vegetables such as carrots, sweet potatoes, and winter squash  · Eat a variety of fruits every day  Choose fresh or canned fruit (canned in its own juice or light syrup) instead of juice  Fruit juice has very little or no fiber  · Eat low-fat dairy foods  Drink fat-free (skim) milk or 1% milk  Eat fat-free yogurt and low-fat cottage cheese  Try low-fat cheeses such as mozzarella and other reduced-fat cheeses  · Choose meat and other protein foods that are low in fat  Choose beans or other legumes such as split peas or lentils  Choose fish, skinless poultry (chicken or turkey), or lean cuts of red meat (beef or pork)  Before you cook meat or poultry, cut off any visible fat  · Use less fat and oil  Try baking foods instead of frying them  Add less fat, such as margarine, sour cream, regular salad dressing and mayonnaise to foods  Eat fewer high-fat foods  Some examples of high-fat foods include french fries, doughnuts, ice cream, and cakes  · Eat fewer sweets  Limit foods and drinks that are high in sugar  This includes candy, cookies, regular soda, and sweetened drinks  Exercise:  Exercise at least 30 minutes per day on most days of the week  Some examples of exercise include walking, biking, dancing, and swimming  You can also fit in more physical activity by taking the stairs instead of the elevator or parking farther away from stores  Ask your healthcare provider about the best exercise plan for you  © Copyright [employer] 2018 Information is for End User's use only and may not be sold, redistributed or otherwise used for commercial purposes   All illustrations and images included in CareNotes® are the copyrighted property of A D [employer] M , [employer]  or [address] [place] [address]

## 2022-10-27 ENCOUNTER — APPOINTMENT (OUTPATIENT)
Dept: LAB | Facility: HOSPITAL | Age: 77
End: 2022-10-27
Payer: COMMERCIAL

## 2022-10-27 DIAGNOSIS — E11.65 TYPE 2 DIABETES MELLITUS WITH HYPERGLYCEMIA, WITHOUT LONG-TERM CURRENT USE OF INSULIN (HCC): ICD-10-CM

## 2022-10-27 DIAGNOSIS — Z00.00 MEDICARE ANNUAL WELLNESS VISIT, SUBSEQUENT: ICD-10-CM

## 2022-10-27 LAB
ALBUMIN SERPL BCP-MCNC: 4.2 G/DL (ref 3.5–5)
ALP SERPL-CCNC: 109 U/L (ref 43–122)
ALT SERPL W P-5'-P-CCNC: 47 U/L
ANION GAP SERPL CALCULATED.3IONS-SCNC: 9 MMOL/L (ref 5–14)
AST SERPL W P-5'-P-CCNC: 35 U/L (ref 17–59)
BILIRUB SERPL-MCNC: 0.71 MG/DL (ref 0.2–1)
BUN SERPL-MCNC: 28 MG/DL (ref 5–25)
CALCIUM SERPL-MCNC: 9.3 MG/DL (ref 8.4–10.2)
CHLORIDE SERPL-SCNC: 106 MMOL/L (ref 96–108)
CHOLEST SERPL-MCNC: 84 MG/DL
CO2 SERPL-SCNC: 28 MMOL/L (ref 21–32)
CREAT SERPL-MCNC: 1.48 MG/DL (ref 0.7–1.5)
EST. AVERAGE GLUCOSE BLD GHB EST-MCNC: 114 MG/DL
GFR SERPL CREATININE-BSD FRML MDRD: 45 ML/MIN/1.73SQ M
GLUCOSE P FAST SERPL-MCNC: 104 MG/DL (ref 70–99)
HBA1C MFR BLD: 5.6 %
HDLC SERPL-MCNC: 29 MG/DL
LDLC SERPL CALC-MCNC: 36 MG/DL
NONHDLC SERPL-MCNC: 55 MG/DL
POTASSIUM SERPL-SCNC: 4.9 MMOL/L (ref 3.5–5.3)
PROT SERPL-MCNC: 7.2 G/DL (ref 6.4–8.4)
SODIUM SERPL-SCNC: 143 MMOL/L (ref 135–147)
TRIGL SERPL-MCNC: 97 MG/DL

## 2022-10-27 PROCEDURE — 36415 COLL VENOUS BLD VENIPUNCTURE: CPT

## 2022-10-27 PROCEDURE — 83036 HEMOGLOBIN GLYCOSYLATED A1C: CPT

## 2022-10-27 PROCEDURE — 80061 LIPID PANEL: CPT

## 2022-10-27 PROCEDURE — 80053 COMPREHEN METABOLIC PANEL: CPT

## 2022-11-01 ENCOUNTER — CLINICAL SUPPORT (OUTPATIENT)
Dept: FAMILY MEDICINE CLINIC | Facility: CLINIC | Age: 77
End: 2022-11-01

## 2022-11-01 VITALS
DIASTOLIC BLOOD PRESSURE: 80 MMHG | TEMPERATURE: 98 F | RESPIRATION RATE: 16 BRPM | HEIGHT: 63 IN | SYSTOLIC BLOOD PRESSURE: 138 MMHG | BODY MASS INDEX: 33.31 KG/M2 | WEIGHT: 188 LBS | HEART RATE: 78 BPM | OXYGEN SATURATION: 98 %

## 2022-11-01 DIAGNOSIS — R50.9 FEVER, UNSPECIFIED FEVER CAUSE: Primary | ICD-10-CM

## 2022-11-01 DIAGNOSIS — R05.9 COUGH, UNSPECIFIED TYPE: ICD-10-CM

## 2022-11-01 DIAGNOSIS — R52 BODY ACHES: ICD-10-CM

## 2022-11-02 ENCOUNTER — TELEMEDICINE (OUTPATIENT)
Dept: FAMILY MEDICINE CLINIC | Facility: CLINIC | Age: 77
End: 2022-11-02

## 2022-11-02 DIAGNOSIS — U07.1 COVID-19: Primary | ICD-10-CM

## 2022-11-02 LAB
FLUAV RNA RESP QL NAA+PROBE: NEGATIVE
FLUBV RNA RESP QL NAA+PROBE: NEGATIVE
SARS-COV-2 RNA RESP QL NAA+PROBE: POSITIVE

## 2022-11-02 RX ORDER — NIRMATRELVIR AND RITONAVIR 150-100 MG
2 KIT ORAL 2 TIMES DAILY
Qty: 20 TABLET | Refills: 0 | Status: SHIPPED | OUTPATIENT
Start: 2022-11-02 | End: 2022-11-07

## 2022-11-02 NOTE — PROGRESS NOTES
COVID-19 Outpatient Progress Note    Assessment/Plan:    Problem List Items Addressed This Visit    None     Visit Diagnoses     COVID-19    -  Primary    Relevant Medications    nirmatrelvir & ritonavir (Paxlovid, 150/100,) tablet therapy pack         Disposition:     I have spent 15 minutes directly with the patient  Greater than 50% of this time was spent in counseling/coordination of care regarding: prognosis and risks and benefits of treatment options  Encounter provider: Criss Panda MD     Provider located at: [facility] AT [address]  [address] [place] [address] [phone]     Recent Visits  Date Type Provider Dept   11/02/22 Telemedicine Criss Panda MD [facility] recent visits within past 7 days and meeting all other requirements  Future Appointments  No visits were found meeting these conditions  Showing future appointments within next 150 days and meeting all other requirements     This virtual check-in was done via  [address] and patient was informed that this is a secure, HIPAA-compliant platform  He agrees to proceed  Patient agrees to participate in a virtual check in via telephone or video visit instead of presenting to the office to address urgent/immediate medical needs  Patient is aware this is a billable service  He acknowledged consent and understanding of privacy and security of the video platform  The patient has agreed to participate and understands they can discontinue the visit at any time  After connecting through [facility], the patient was identified by name and date of birth  Catrina Brood was informed that this was a telemedicine visit and that the exam was being conducted confidentially over secure lines  Catrina Frances acknowledged consent and understanding of privacy and security of the telemedicine visit   I informed the patient that I have reviewed his record in [address] [place] [address] and presented the opportunity for him to ask any questions regarding the visit today  The patient agreed to participate  Verification of patient location:  Patient is located in the following state in which I hold an active license: [place]    Subjective:   Catrina Frances is a 69 y o  male who is concerned about COVID-19  Patient's symptoms include chills, nasal congestion, sore throat and cough  Patient denies fever, fatigue, shortness of breath, chest tightness, abdominal pain and headaches  - Date of symptom onset: 10/30/2022  - Date of exposure: 11/1/2022      COVID-19 vaccination status: Fully vaccinated with booster    Exposure:   Contact with a person who is under investigation (PUI) for or who is positive for COVID-19 within the last 14 days?: No    Hospitalized recently for fever and/or lower respiratory symptoms?: No      Currently a healthcare worker that is involved in direct patient care?: No      Works in a special setting where the risk of COVID-19 transmission may be high? (this may include long-term care, correctional and assisted facilities; homeless shelters; assisted-living facilities and group homes ): No      Resident in a special setting where the risk of COVID-19 transmission may be high? (this may include long-term care, correctional and assisted facilities; homeless shelters; assisted-living facilities and group homes ): No      Lab Results   Component Value Date    SARSCOV2 Positive (A) 11/01/2022       Review of Systems   Constitutional: Positive for chills  Negative for diaphoresis, fatigue, fever and unexpected weight change  HENT: Positive for congestion and sore throat  Respiratory: Positive for cough  Negative for apnea, choking, chest tightness and shortness of breath  Cardiovascular: Negative for chest pain, palpitations and leg swelling     Gastrointestinal: Negative for abdominal distention, abdominal pain, anal bleeding, blood in stool and constipation  Musculoskeletal: Negative for arthralgias, back pain, gait problem and joint swelling  Neurological: Negative for dizziness, facial asymmetry, light-headedness and headaches  Psychiatric/Behavioral: Negative for behavioral problems, dysphoric mood and self-injury  The patient is not nervous/anxious  Current Outpatient Medications on File Prior to Visit   Medication Sig   • Alcohol Swabs (Comfort Touch Alcohol Prep) 70 % PADS TEST 3-4 TIMES A DAY AS DIRECTED   • aspirin 81 mg chewable tablet Chew 1 tablet (81 mg total) every 24 hours   • atorvastatin (LIPITOR) 80 mg tablet Take 1 tablet (80 mg total) by mouth daily   • Blood Glucose Calibration (OT ULTRA/FASTTK CNTRL SOLN) SOLN Use as directed   • clopidogrel (Plavix) 75 mg tablet Take 1 tablet (75 mg total) by mouth every 24 hours   • Comfort EZ Pen Needles 32G X 4 MM MISC TEST 3-4 TIMES A DAY AS DIRECTED   • ezetimibe (ZETIA) 10 mg tablet Take 1 tablet (10 mg total) by mouth daily   • metFORMIN (GLUCOPHAGE) 500 mg tablet Take 1 tablet (500 mg total) by mouth 2 (two) times a day with meals   • metoprolol succinate (TOPROL-XL) 100 mg 24 hr tablet Take 1 tablet (100 mg total) by mouth daily   • olmesartan-hydrochlorothiazide (BENICAR HCT) 40-12 5 MG per tablet Take 1 tablet by mouth daily   • OneTouch Ultra test strip TEST 3-4 TIMES A DAY AS DIRECTED   • Ozempic, 1 MG/DOSE, 4 MG/3ML SOPN injection pen INJECT 1 MG UNDER THE SKIN ONCE A WEEK FOR 24 DOSES   • tamsulosin (FLOMAX) 0 4 mg TAKE ONE CAPSULE BY MOUTH DAILY       Objective: There were no vitals taken for this visit  Physical Exam  Vitals and nursing note reviewed  Constitutional:       Appearance: He is well-developed  HENT:      Head: Normocephalic and atraumatic  Eyes:      Conjunctiva/sclera: Conjunctivae normal    Cardiovascular:      Rate and Rhythm: Normal rate and regular rhythm  Heart sounds: No murmur heard    Pulmonary:      Effort: Pulmonary effort is normal  No respiratory distress  Breath sounds: Normal breath sounds  Abdominal:      Palpations: Abdomen is soft  Tenderness: There is no abdominal tenderness  Musculoskeletal:      Cervical back: Neck supple  Skin:     General: Skin is warm and dry  Neurological:      Mental Status: He is alert            Pearl Conner MD

## 2022-11-20 DIAGNOSIS — E11.65 TYPE 2 DIABETES MELLITUS WITH HYPERGLYCEMIA, WITHOUT LONG-TERM CURRENT USE OF INSULIN (HCC): Primary | ICD-10-CM

## 2022-12-04 DIAGNOSIS — I10 ESSENTIAL HYPERTENSION: ICD-10-CM

## 2022-12-05 RX ORDER — METOPROLOL SUCCINATE 100 MG/1
TABLET, EXTENDED RELEASE ORAL
Qty: 90 TABLET | Refills: 3 | Status: SHIPPED | OUTPATIENT
Start: 2022-12-05

## 2022-12-17 DIAGNOSIS — E11.65 TYPE 2 DIABETES MELLITUS WITH HYPERGLYCEMIA, WITHOUT LONG-TERM CURRENT USE OF INSULIN (HCC): ICD-10-CM

## 2022-12-20 RX ORDER — SEMAGLUTIDE 1.34 MG/ML
INJECTION, SOLUTION SUBCUTANEOUS
Qty: 3 ML | Refills: 5 | Status: SHIPPED | OUTPATIENT
Start: 2022-12-20

## 2023-01-27 ENCOUNTER — OFFICE VISIT (OUTPATIENT)
Dept: CARDIOLOGY CLINIC | Facility: CLINIC | Age: 78
End: 2023-01-27

## 2023-01-27 VITALS
BODY MASS INDEX: 33.44 KG/M2 | WEIGHT: 188.8 LBS | SYSTOLIC BLOOD PRESSURE: 130 MMHG | DIASTOLIC BLOOD PRESSURE: 78 MMHG | HEART RATE: 72 BPM

## 2023-01-27 DIAGNOSIS — Z95.1 S/P CABG X 3: ICD-10-CM

## 2023-01-27 DIAGNOSIS — I25.2 OLD MYOCARDIAL INFARCTION: ICD-10-CM

## 2023-01-27 DIAGNOSIS — I25.10 CORONARY ARTERY DISEASE INVOLVING NATIVE CORONARY ARTERY OF NATIVE HEART WITHOUT ANGINA PECTORIS: Primary | ICD-10-CM

## 2023-01-27 DIAGNOSIS — I10 PRIMARY HYPERTENSION: ICD-10-CM

## 2023-01-27 DIAGNOSIS — N18.31 CKD STAGE G3A/A2, GFR 45-59 AND ALBUMIN CREATININE RATIO 30-299 MG/G (HCC): ICD-10-CM

## 2023-01-27 DIAGNOSIS — E78.00 PURE HYPERCHOLESTEROLEMIA: ICD-10-CM

## 2023-01-27 DIAGNOSIS — E66.9 CLASS 1 OBESITY: ICD-10-CM

## 2023-01-27 DIAGNOSIS — E11.65 TYPE 2 DIABETES MELLITUS WITH HYPERGLYCEMIA, WITHOUT LONG-TERM CURRENT USE OF INSULIN (HCC): ICD-10-CM

## 2023-01-27 NOTE — PROGRESS NOTES
[facility]  [address] [facility]   [address]  [id number]  Phone#  [phone]   Fax#  [phone]  *-*-*-*-*-*-*-*-*-*-*-*-*-*-*-*-*-*-*-*-*-*-*-*-*-*-*-*-*-*-*-*-*-*-*-*-*-*-*-*-*-*-*-*-*-*-*-*-*-*-*-*-*-*                                   Cardiology Follow Up      ENCOUNTER DATE: 23 9:31 AM  PATIENT NAME: Rosa Anderson   : 1945    MRN: [id number]  AGE:77 y o  SEX: male  [address] Renae MD     PRIMARY CARE PHYSICIAN: Jennifer Epps MD    ACTIVE DIAGNOSIS THIS VISIT  1  Coronary artery disease involving native coronary artery of native heart without angina pectoris        2  Old myocardial infarction        3  Pure hypercholesterolemia  POCT ECG      4  S/P CABG x 3          5  Primary hypertension        6  Type 2 diabetes mellitus with hyperglycemia, without long-term current use of insulin ([facility])        7  CKD stage G3a/A2, 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 and albumin creatinine ratio  mg/g ([facility])        8  Class 1 obesity          ACTIVE PROBLEM LIST  Patient Active Problem List   Diagnosis   • CAD (coronary artery disease)   • Diverticulosis   • Hypertension   • Hyperlipidemia   • Old myocardial infarction   • S/P CABG x 3     • Thrombocytopenia (HCC)   • Type 2 diabetes mellitus with hyperglycemia, without long-term current use of insulin ([address] )   • CKD stage G3a/A2, GFR 45-59 and albumin creatinine ratio  mg/g ([address] )   • Need for shingles vaccine   • Class 1 obesity       CARDIOLOGY SPECIALTY COMMENTS  Patient was 1st seen in our office on 2013 for midsternal chest discomfort and dyspnea on exertion  His electrocardiogram demonstrated old inferior wall myocardial infarction with possible lateral extension  Other diagnoses include hypertension, metabolic syndrome, hyperlipidemia  Cardiac catheterization demonstrated a 70-80% left anterior after the 2nd diagonal branch  Circumflex artery minor luminal irregularities   Right coronary artery dominant in distribution with a proximal 90% lesion  Patient underwent coronary artery bypass surgery with a LIMA to the LAD, SVG to the right coronary artery, SVG to the OM branch of the circumflex  03/19/2013 echocardiogram: Normal left ventricular systolic and diastolic function  Mildly elevated left ventricular filling pressures  Aortic sclerosis with mild aortic regurgitation  Moderately severe concentric left ventricular hypertrophy  10/04/2018 echocardiogram:  Normal left ventricular systolic function EF 69%  Grade 3 diastolic left ventricular dysfunction  Moderate concentric left ventricular hypertrophy  Biatrial enlargement  Aortic sclerosis  INTERVAL HISTORY:        Patient has no cardiac complaints  Patient denies chest discomfort or shortness of breath  Patient has no palpitations  Patient denies symptoms of dizziness, lightheadedness or near-syncope/syncope  Patient denies leg edema  Patient denies symptoms of orthopnea or paroxysmal nocturnal dyspnea  His blood pressure is good and his cholesterol values are excellent      DISCUSSION/PLAN:          Return in 6 months    Lab Studies:    Lab Results   Component Value Date    CHOLESTEROL 84 10/27/2022    CHOLESTEROL 82 04/15/2022    CHOLESTEROL 104 09/09/2021     Lab Results   Component Value Date    TRIG 97 10/27/2022    TRIG 81 04/15/2022    TRIG 115 09/09/2021     Lab Results   Component Value Date    HDL 29 (L) 10/27/2022    HDL 26 (L) 04/15/2022    HDL 30 (L) 09/09/2021     Lab Results   Component Value Date    LDLCALC 36 10/27/2022    LDLCALC 40 04/15/2022    LDLCALC 51 09/09/2021       Lab Results   Component Value Date    HGBA1C 5 6 10/27/2022      Lab Results   Component Value Date    EGFR 45 10/27/2022    EGFR 47 04/15/2022    EGFR 58 01/12/2022    SODIUM 143 10/27/2022    SODIUM 143 04/15/2022    SODIUM 140 01/12/2022    K 4 9 10/27/2022    K 4 4 04/15/2022    K 4 8 01/12/2022     10/27/2022     (H) 04/15/2022     01/12/2022    CO2 28 10/27/2022    CO2 25 04/15/2022    CO2 31 (H) 01/12/2022    ANIONGAP 11 04/27/2018    BUN 28 (H) 10/27/2022    BUN 23 04/15/2022    BUN 15 01/12/2022    CREATININE 1 48 10/27/2022    CREATININE 1 42 04/15/2022    CREATININE 1 20 01/12/2022     Lab Results   Component Value Date    WBC 5 20 01/12/2022    WBC 5 70 05/05/2021    WBC 6 30 07/16/2020    HGB 14 2 01/12/2022    HGB 14 6 05/05/2021    HGB 14 9 07/16/2020    HCT 40 4 (L) 01/12/2022    HCT 43 0 05/05/2021    HCT 43 7 07/16/2020    MCV 91 01/12/2022    MCV 94 05/05/2021    MCV 94 07/16/2020    MCH 32 0 01/12/2022    MCH 32 0 05/05/2021    MCH 32 1 07/16/2020    MCHC 35 1 01/12/2022    MCHC 34 0 05/05/2021    MCHC 34 2 07/16/2020     01/12/2022     (L) 05/05/2021     (L) 07/16/2020      Lab Results   Component Value Date    GLUCOSE 90 04/27/2018    CALCIUM 9 3 10/27/2022    CALCIUM 8 4 04/15/2022    CALCIUM 8 7 01/12/2022    AST 35 10/27/2022    AST 28 04/15/2022    AST 36 01/12/2022    ALT 47 10/27/2022    ALT 34 04/15/2022    ALT 61 (H) 01/12/2022    ALKPHOS 109 10/27/2022    ALKPHOS 104 04/15/2022    ALKPHOS 110 01/12/2022    PROT 6 6 04/27/2018    BILITOT 0 8 04/27/2018    MG 2 0 11/12/2018     Results for orders placed or performed in visit on 01/27/23   POCT ECG    Narrative    Normal sinus rhythm at a rate of 72 bpm   First-degree AV block  Otherwise normal EKG           Current Outpatient Medications:   •  Alcohol Swabs (Comfort Touch Alcohol Prep) 70 % PADS, TEST 3-4 TIMES A DAY AS DIRECTED, Disp: 100 each, Rfl: 3  •  aspirin 81 mg chewable tablet, Chew 1 tablet (81 mg total) every 24 hours, Disp: 90 tablet, Rfl: 3  •  atorvastatin (LIPITOR) 80 mg tablet, Take 1 tablet (80 mg total) by mouth daily, Disp: 90 tablet, Rfl: 3  •  Blood Glucose Calibration (OT ULTRA/FASTTK CNTRL SOLN) SOLN, Use as directed, Disp: , Rfl:   •  clopidogrel (Plavix) 75 mg tablet, Take 1 tablet (75 mg total) by mouth every 24 hours, Disp: 90 tablet, Rfl: 3  •  Comfort EZ Pen Needles 32G X 4 MM MISC, TEST 3-4 TIMES A DAY AS DIRECTED, Disp: , Rfl:   •  ezetimibe (ZETIA) 10 mg tablet, Take 1 tablet (10 mg total) by mouth daily, Disp: 90 tablet, Rfl: 3  •  metFORMIN (GLUCOPHAGE) 500 mg tablet, Take 1 tablet (500 mg total) by mouth 2 (two) times a day with meals   , Disp: 180 tablet, Rfl: 3  •  metoprolol succinate (TOPROL-XL) 100 mg 24 hr tablet, TAKE ONE TABLET BY MOUTH DAILY, Disp: 90 tablet, Rfl: 3  •  olmesartan-hydrochlorothiazide (BENICAR HCT) 40-12 5 MG per tablet, Take 1 tablet by mouth daily, Disp: 90 tablet, Rfl: 3  •  OneTouch Ultra test strip, TEST 3-4 TIMES A DAY AS DIRECTED, Disp: , Rfl:   •  Ozempic, 1 MG/DOSE, 4 MG/3ML SOPN injection pen, INJECT 1 MG UNDER THE SKIN ONCE A WEEK FOR 24 DOSES, Disp: 3 mL, Rfl: 5  •  tamsulosin (FLOMAX) 0 4 mg, TAKE ONE CAPSULE BY MOUTH DAILY, Disp: 90 capsule, Rfl: 3  Allergies   Allergen Reactions   • No Active Allergies        Past Medical History:   Diagnosis Date   • CAD (coronary artery disease)    • Coronary artery disease    • Diabetes mellitus (HCC)    • Diverticulosis    • Hyperglycemia    • Hyperlipidemia    • Hypertension    • Obesity      Social History     Socioeconomic History   • Marital status: /Civil Union     Spouse name: Not on file   • Number of children: Not on file   • Years of education: Not on file   • Highest education level: Not on file   Occupational History   • Not on file   Tobacco Use   • Smoking status: Former     Types: Cigarettes     Quit date: 2012     Years since quittin 0   • Smokeless tobacco: Never   Substance and Sexual Activity   • Alcohol use:  Yes   • Drug use: No   • Sexual activity: Yes     Partners: Female   Other Topics Concern   • Not on file   Social History Narrative   • Not on file     Social Determinants of Health     Financial Resource Strain: Low Risk    • Difficulty of Paying Living Expenses: Not hard at all   Food Insecurity: Not on file   Transportation Needs: No Transportation Needs   • Lack of Transportation (Medical): No   • Lack of Transportation (Non-Medical): No   Physical Activity: Not on file   Stress: Not on file   Social Connections: Not on file   Intimate Partner Violence: Not on file   Housing Stability: Not on file      Family History   Problem Relation Age of Onset   • Hypertension Mother      Past Surgical History:   Procedure Laterality Date   • CARDIOVASCULAR STRESS TEST  03/20/2013   • COLONOSCOPY      4/8/2020:diverticulosis   • COLONOSCOPY W/ POLYPECTOMY      2/6/17:colon,polyp     • CORONARY ARTERY BYPASS GRAFT  01/21/2016    x3       PREVIOUS WEIGHTS:   Wt Readings from Last 10 Encounters:   01/27/23 85 6 kg (188 lb 12 8 oz)   11/01/22 85 3 kg (188 lb)   10/19/22 85 3 kg (188 lb)   06/10/22 84 8 kg (187 lb)   04/14/22 87 1 kg (192 lb)   01/11/22 89 8 kg (198 lb)   11/22/21 88 5 kg (195 lb 3 2 oz)   09/07/21 92 5 kg (204 lb)   05/04/21 92 2 kg (203 lb 3 2 oz)   05/03/21 91 2 kg (201 lb)        Review of Systems:  Review of Systems   Constitutional: Negative for activity change  Respiratory: Negative for cough, chest tightness, shortness of breath and wheezing  Cardiovascular: Negative for chest pain, palpitations and leg swelling  Musculoskeletal: Negative for gait problem  Skin: Negative for color change  Neurological: Negative for dizziness, tremors, syncope, weakness, light-headedness and headaches  Psychiatric/Behavioral: Negative for agitation and confusion  Physical Exam:  /78 (BP Location: Left arm, Patient Position: Sitting, Cuff Size: Large)   Pulse 72   Wt 85 6 kg (188 lb 12 8 oz)   BMI 33 44 kg/m²     Physical Exam  Vitals reviewed  Constitutional:       General: He is not in acute distress  Appearance: He is well-developed  HENT:      Head: Normocephalic and atraumatic  Neck:      Thyroid: No thyromegaly  Vascular: No carotid bruit or JVD        Trachea: No tracheal deviation  Cardiovascular:      Rate and Rhythm: Normal rate and regular rhythm  Pulses: Normal pulses  Heart sounds: Normal heart sounds  No murmur heard  No friction rub  No gallop  Pulmonary:      Effort: Pulmonary effort is normal  No respiratory distress  Breath sounds: Normal breath sounds  No wheezing, rhonchi or rales  Chest:      Chest wall: No tenderness  Musculoskeletal:      Cervical back: Normal range of motion and neck supple  Right lower leg: No edema  Left lower leg: No edema  Skin:     General: Skin is warm and dry  Neurological:      General: No focal deficit present  Mental Status: He is alert and oriented to person, place, and time  Psychiatric:         Mood and Affect: Mood normal          Behavior: Behavior normal          Thought Content: Thought content normal          Judgment: Judgment normal        ======================================================  Imaging:   I have personally reviewed pertinent reports  Portions of the record may have been created with voice recognition software  Occasional wrong word or "sound a like" substitutions may have occurred due to the inherent limitations of voice recognition software  Read the chart carefully and recognize, using context, where substitutions have occurred      SIGNATURES:   Bertie Holter, MD

## 2023-01-28 DIAGNOSIS — I10 ESSENTIAL HYPERTENSION: ICD-10-CM

## 2023-01-30 RX ORDER — ATORVASTATIN CALCIUM 80 MG/1
80 TABLET, FILM COATED ORAL DAILY
Qty: 90 TABLET | Refills: 3 | Status: SHIPPED | OUTPATIENT
Start: 2023-01-30

## 2023-02-01 DIAGNOSIS — E11.65 TYPE 2 DIABETES MELLITUS WITH HYPERGLYCEMIA, WITHOUT LONG-TERM CURRENT USE OF INSULIN (HCC): ICD-10-CM

## 2023-02-10 DIAGNOSIS — E78.00 PURE HYPERCHOLESTEROLEMIA: ICD-10-CM

## 2023-02-10 RX ORDER — EZETIMIBE 10 MG/1
10 TABLET ORAL DAILY
Qty: 90 TABLET | Refills: 3 | Status: SHIPPED | OUTPATIENT
Start: 2023-02-10

## 2023-02-16 ENCOUNTER — RA CDI HCC (OUTPATIENT)
Dept: OTHER | Facility: HOSPITAL | Age: 78
End: 2023-02-16

## 2023-02-16 NOTE — PROGRESS NOTES
[address]  coding opportunities     E11 22     Chart Reviewed number of suggestions sent to Provider: 1     Patients Insurance     Medicare Insurance: [address] [place] [address]

## 2023-02-22 ENCOUNTER — OFFICE VISIT (OUTPATIENT)
Dept: FAMILY MEDICINE CLINIC | Facility: CLINIC | Age: 78
End: 2023-02-22

## 2023-02-22 VITALS
BODY MASS INDEX: 32.96 KG/M2 | RESPIRATION RATE: 16 BRPM | TEMPERATURE: 98 F | DIASTOLIC BLOOD PRESSURE: 70 MMHG | OXYGEN SATURATION: 98 % | HEART RATE: 68 BPM | WEIGHT: 186 LBS | HEIGHT: 63 IN | SYSTOLIC BLOOD PRESSURE: 138 MMHG

## 2023-02-22 DIAGNOSIS — N40.0 BENIGN PROSTATIC HYPERPLASIA WITHOUT LOWER URINARY TRACT SYMPTOMS: ICD-10-CM

## 2023-02-22 DIAGNOSIS — I25.10 CORONARY ARTERY DISEASE INVOLVING NATIVE CORONARY ARTERY OF NATIVE HEART WITHOUT ANGINA PECTORIS: ICD-10-CM

## 2023-02-22 DIAGNOSIS — I10 ESSENTIAL HYPERTENSION: ICD-10-CM

## 2023-02-22 DIAGNOSIS — E11.65 TYPE 2 DIABETES MELLITUS WITH HYPERGLYCEMIA, WITHOUT LONG-TERM CURRENT USE OF INSULIN (HCC): ICD-10-CM

## 2023-02-22 DIAGNOSIS — D69.6 THROMBOCYTOPENIA (HCC): ICD-10-CM

## 2023-02-22 DIAGNOSIS — I10 PRIMARY HYPERTENSION: Primary | ICD-10-CM

## 2023-02-22 DIAGNOSIS — Z86.010 HISTORY OF COLON POLYPS: ICD-10-CM

## 2023-02-22 LAB — SL AMB POCT HEMOGLOBIN AIC: 5.5 (ref ?–6.5)

## 2023-02-22 RX ORDER — OLMESARTAN MEDOXOMIL AND HYDROCHLOROTHIAZIDE 40/12.5 40; 12.5 MG/1; MG/1
1 TABLET ORAL DAILY
Qty: 90 TABLET | Refills: 3 | Status: SHIPPED | OUTPATIENT
Start: 2023-02-22

## 2023-02-22 RX ORDER — TAMSULOSIN HYDROCHLORIDE 0.4 MG/1
0.4 CAPSULE ORAL DAILY
Qty: 90 CAPSULE | Refills: 3 | Status: SHIPPED | OUTPATIENT
Start: 2023-02-22

## 2023-02-22 RX ORDER — ASPIRIN 81 MG/1
81 TABLET, CHEWABLE ORAL EVERY 24 HOURS
Qty: 90 TABLET | Refills: 3 | Status: SHIPPED | OUTPATIENT
Start: 2023-02-22

## 2023-02-22 NOTE — ASSESSMENT & PLAN NOTE
He has previous low platelets  Current numbers are improved  Lab Results   Component Value Date/Time     01/12/2022 10:18 AM     04/27/2018 07:03 PM   Recheck labs

## 2023-02-24 DIAGNOSIS — E11.65 TYPE 2 DIABETES MELLITUS WITH HYPERGLYCEMIA, WITHOUT LONG-TERM CURRENT USE OF INSULIN (HCC): ICD-10-CM

## 2023-02-27 ENCOUNTER — LAB (OUTPATIENT)
Dept: LAB | Facility: HOSPITAL | Age: 78
End: 2023-02-27

## 2023-02-27 DIAGNOSIS — E11.65 TYPE 2 DIABETES MELLITUS WITH HYPERGLYCEMIA, WITHOUT LONG-TERM CURRENT USE OF INSULIN (HCC): ICD-10-CM

## 2023-02-27 DIAGNOSIS — I10 PRIMARY HYPERTENSION: ICD-10-CM

## 2023-02-27 LAB
ALBUMIN SERPL BCP-MCNC: 4.2 G/DL (ref 3.5–5)
ALP SERPL-CCNC: 105 U/L (ref 43–122)
ALT SERPL W P-5'-P-CCNC: 38 U/L
ANION GAP SERPL CALCULATED.3IONS-SCNC: 7 MMOL/L (ref 5–14)
AST SERPL W P-5'-P-CCNC: 30 U/L (ref 17–59)
BASOPHILS # BLD AUTO: 0.03 THOUSANDS/ÂΜL (ref 0–0.1)
BASOPHILS NFR BLD AUTO: 1 % (ref 0–1)
BILIRUB SERPL-MCNC: 0.54 MG/DL (ref 0.2–1)
BUN SERPL-MCNC: 19 MG/DL (ref 5–25)
CALCIUM SERPL-MCNC: 8.7 MG/DL (ref 8.4–10.2)
CHLORIDE SERPL-SCNC: 104 MMOL/L (ref 96–108)
CHOLEST SERPL-MCNC: 87 MG/DL
CO2 SERPL-SCNC: 28 MMOL/L (ref 21–32)
CREAT SERPL-MCNC: 1.41 MG/DL (ref 0.7–1.5)
EOSINOPHIL # BLD AUTO: 0.17 THOUSAND/ÂΜL (ref 0–0.61)
EOSINOPHIL NFR BLD AUTO: 3 % (ref 0–6)
ERYTHROCYTE [DISTWIDTH] IN BLOOD BY AUTOMATED COUNT: 13.3 % (ref 11.6–15.1)
GFR SERPL CREATININE-BSD FRML MDRD: 48 ML/MIN/1.73SQ M
GLUCOSE P FAST SERPL-MCNC: 100 MG/DL (ref 70–99)
HCT VFR BLD AUTO: 42.3 % (ref 36.5–49.3)
HDLC SERPL-MCNC: 27 MG/DL
HGB BLD-MCNC: 13.6 G/DL (ref 12–17)
IMM GRANULOCYTES # BLD AUTO: 0.01 THOUSAND/UL (ref 0–0.2)
IMM GRANULOCYTES NFR BLD AUTO: 0 % (ref 0–2)
LDLC SERPL CALC-MCNC: 43 MG/DL
LYMPHOCYTES # BLD AUTO: 0.92 THOUSANDS/ÂΜL (ref 0.6–4.47)
LYMPHOCYTES NFR BLD AUTO: 18 % (ref 14–44)
MCH RBC QN AUTO: 30.6 PG (ref 26.8–34.3)
MCHC RBC AUTO-ENTMCNC: 32.2 G/DL (ref 31.4–37.4)
MCV RBC AUTO: 95 FL (ref 82–98)
MONOCYTES # BLD AUTO: 0.71 THOUSAND/ÂΜL (ref 0.17–1.22)
MONOCYTES NFR BLD AUTO: 14 % (ref 4–12)
NEUTROPHILS # BLD AUTO: 3.43 THOUSANDS/ÂΜL (ref 1.85–7.62)
NEUTS SEG NFR BLD AUTO: 64 % (ref 43–75)
NRBC BLD AUTO-RTO: 0 /100 WBCS
PLATELET # BLD AUTO: 175 THOUSANDS/UL (ref 149–390)
PMV BLD AUTO: 10.8 FL (ref 8.9–12.7)
POTASSIUM SERPL-SCNC: 4.9 MMOL/L (ref 3.5–5.3)
PROT SERPL-MCNC: 7.2 G/DL (ref 6.4–8.4)
RBC # BLD AUTO: 4.45 MILLION/UL (ref 3.88–5.62)
SODIUM SERPL-SCNC: 139 MMOL/L (ref 135–147)
TRIGL SERPL-MCNC: 87 MG/DL
WBC # BLD AUTO: 5.27 THOUSAND/UL (ref 4.31–10.16)

## 2023-04-26 DIAGNOSIS — I25.10 CORONARY ARTERY DISEASE INVOLVING NATIVE CORONARY ARTERY OF NATIVE HEART WITHOUT ANGINA PECTORIS: ICD-10-CM

## 2023-04-26 DIAGNOSIS — I10 ESSENTIAL HYPERTENSION: ICD-10-CM

## 2023-04-26 RX ORDER — OLMESARTAN MEDOXOMIL AND HYDROCHLOROTHIAZIDE 40/12.5 40; 12.5 MG/1; MG/1
1 TABLET ORAL DAILY
Qty: 90 TABLET | Refills: 3 | Status: SHIPPED | OUTPATIENT
Start: 2023-04-26

## 2023-04-26 RX ORDER — CLOPIDOGREL BISULFATE 75 MG/1
75 TABLET ORAL EVERY 24 HOURS
Qty: 90 TABLET | Refills: 3 | Status: SHIPPED | OUTPATIENT
Start: 2023-04-26

## 2023-05-10 ENCOUNTER — CONSULT (OUTPATIENT)
Dept: GASTROENTEROLOGY | Facility: CLINIC | Age: 78
End: 2023-05-10

## 2023-05-10 VITALS
HEIGHT: 63 IN | SYSTOLIC BLOOD PRESSURE: 124 MMHG | DIASTOLIC BLOOD PRESSURE: 80 MMHG | TEMPERATURE: 97.8 F | WEIGHT: 182.4 LBS | BODY MASS INDEX: 32.32 KG/M2

## 2023-05-10 DIAGNOSIS — Z86.010 HISTORY OF COLON POLYPS: ICD-10-CM

## 2023-05-10 NOTE — PROGRESS NOTES
[facility] - Outpatient Note  Iban Castellanos 69 y o  male MRN: [id number]  Encounter: [id number]      ASSESSMENT AND PLAN:    Iban Castellanos is a 69 y o  old male with PMH of coronary artery disease status post CABG (on Plavix), diabetes mellitus, hypertension, obesity who presents for consultation    Colon cancer screening-last colonoscopy 2020 with no polyps  Did have sigmoid diverticulosis  Patient was recommended to stop further screening colonoscopies given age and comorbidities  I agree with this previous plan  · Hold off on further screening colonoscopies given age and comorbidities    Hepatitis C screening-negative antibody in 2022  · No further testing needed    1  History of colon polyps  - Ambulatory Referral to Gastroenterology    ______________________________________________________________________    SUBJECTIVE:  Iban Castellanos is a 69 y o  old male with PMH of coronary artery disease status post CABG (on Plavix), diabetes mellitus, hypertension, obesity who presents for consultation  Patient denies abdominal pain, nausea, vomiting, constipation, diarrhea, fevers/chills  No rectal bleeding  last colonoscopy 2020 with no polyps  Did have sigmoid diverticulosis  Patient was recommended to stop further screening colonoscopies given age and comorbidities  Patient states he has had 1 prior colonoscopy in his 46s with no polyps and was recommended repeat in 10 years  No family of colon cancer  REVIEW OF SYSTEMS IS OTHERWISE NEGATIVE        Historical Information   Past Medical History:   Diagnosis Date   • CAD (coronary artery disease)    • Coronary artery disease    • Diabetes mellitus ([facility] [address] [facility] )    • Diverticulosis    • Hyperglycemia    • Hyperlipidemia    • Hypertension    • Obesity      Past Surgical History:   Procedure Laterality Date   • CARDIOVASCULAR STRESS TEST  03/20/2013   • COLONOSCOPY      4/8/2020:diverticulosis   • COLONOSCOPY W/ POLYPECTOMY "17:colon,polyp     • CORONARY ARTERY BYPASS GRAFT  01/21/2016    x3     Social History   Social History     Substance and Sexual Activity   Alcohol Use Yes     Social History     Substance and Sexual Activity   Drug Use No     Social History     Tobacco Use   Smoking Status Former   • Types: Cigarettes   • Quit date: 2012   • Years since quittin 3   Smokeless Tobacco Never     Family History   Problem Relation Age of Onset   • Hypertension Mother        Meds/Allergies       Current Outpatient Medications:   •  Alcohol Swabs (Comfort Touch Alcohol Prep) 70 % PADS  •  aspirin 81 mg chewable tablet  •  atorvastatin (LIPITOR) 80 mg tablet  •  Blood Glucose Calibration (OT ULTRA/FASTTK CNTRL SOLN) SOLN  •  clopidogrel (PLAVIX) 75 mg tablet  •  Comfort EZ Pen Needles 32G X 4 MM MISC  •  ezetimibe (ZETIA) 10 mg tablet  •  metFORMIN (GLUCOPHAGE) 500 mg tablet  •  metoprolol succinate (TOPROL-XL) 100 mg 24 hr tablet  •  olmesartan-hydrochlorothiazide (BENICAR HCT) 40-12 5 MG per tablet  •  OneTouch Ultra test strip  •  Ozempic, 1 MG/DOSE, 4 MG/3ML injection pen  •  tamsulosin (FLOMAX) 0 4 mg    Allergies   Allergen Reactions   • No Active Allergies            Objective     Blood pressure 124/80, temperature 97 8 °F (36 6 °C), temperature source Tympanic, height 5' 3\" (1 6 m), weight 82 7 kg (182 lb 6 4 oz)  Body mass index is 32 31 kg/m²  PHYSICAL EXAM:      General Appearance:   Alert, cooperative, no distress   HEENT:   Normocephalic, atraumatic, anicteric  Neck:  Supple, symmetrical, trachea midline   Lungs:   Clear to auscultation bilaterally; no rales, rhonchi or wheezing; respirations unlabored    Heart[de-identified]   Regular rate and rhythm; no murmur, rub, or gallop     Abdomen:   Soft, non-tender, non-distended; normal bowel sounds; no masses, no organomegaly    Genitalia:   Deferred    Rectal:   Deferred    Extremities:  No cyanosis, clubbing or edema    Pulses:  2+ and symmetric    Skin:  No jaundice, " rashes, or lesions    Lymph nodes:  No palpable cervical lymphadenopathy        Lab Results:   No visits with results within 1 Day(s) from this visit  Latest known visit with results is:   Lab on 02/27/2023   Component Date Value   • Sodium 02/27/2023 139    • Potassium 02/27/2023 4 9    • Chloride 02/27/2023 104    • CO2 02/27/2023 28    • ANION GAP 02/27/2023 7    • BUN 02/27/2023 19    • Creatinine 02/27/2023 1 41    • Glucose, Fasting 02/27/2023 100 (H)    • Calcium 02/27/2023 8 7    • AST 02/27/2023 30    • ALT 02/27/2023 38    • Alkaline Phosphatase 02/27/2023 105    • Total Protein 02/27/2023 7 2    • Albumin 02/27/2023 4 2    • Total Bilirubin 02/27/2023 0 54    • eGFR 02/27/2023 48    • WBC 02/27/2023 5 27    • RBC 02/27/2023 4 45    • Hemoglobin 02/27/2023 13 6    • Hematocrit 02/27/2023 42 3    • MCV 02/27/2023 95    • MCH 02/27/2023 30 6    • MCHC 02/27/2023 32 2    • RDW 02/27/2023 13 3    • MPV 02/27/2023 10 8    • Platelets 79/76/7197 175    • nRBC 02/27/2023 0    • Neutrophils Relative 02/27/2023 64    • Immat GRANS % 02/27/2023 0    • Lymphocytes Relative 02/27/2023 18    • Monocytes Relative 02/27/2023 14 (H)    • Eosinophils Relative 02/27/2023 3    • Basophils Relative 02/27/2023 1    • Neutrophils Absolute 02/27/2023 3 43    • Immature Grans Absolute 02/27/2023 0 01    • Lymphocytes Absolute 02/27/2023 0 92    • Monocytes Absolute 02/27/2023 0 71    • Eosinophils Absolute 02/27/2023 0 17    • Basophils Absolute 02/27/2023 0 03    • Cholesterol 02/27/2023 87    • Triglycerides 02/27/2023 87    • HDL, Direct 02/27/2023 27 (L)    • LDL Calculated 02/27/2023 43          Radiology Results:   No results found

## 2023-08-24 ENCOUNTER — OFFICE VISIT (OUTPATIENT)
Dept: CARDIOLOGY CLINIC | Facility: CLINIC | Age: 78
End: 2023-08-24
Payer: COMMERCIAL

## 2023-08-24 VITALS
SYSTOLIC BLOOD PRESSURE: 130 MMHG | HEART RATE: 53 BPM | WEIGHT: 185.4 LBS | DIASTOLIC BLOOD PRESSURE: 82 MMHG | BODY MASS INDEX: 32.84 KG/M2 | OXYGEN SATURATION: 98 %

## 2023-08-24 DIAGNOSIS — E66.9 CLASS 1 OBESITY: ICD-10-CM

## 2023-08-24 DIAGNOSIS — E78.00 PURE HYPERCHOLESTEROLEMIA: ICD-10-CM

## 2023-08-24 DIAGNOSIS — Z95.1 S/P CABG X 3: ICD-10-CM

## 2023-08-24 DIAGNOSIS — I10 PRIMARY HYPERTENSION: ICD-10-CM

## 2023-08-24 DIAGNOSIS — E11.65 TYPE 2 DIABETES MELLITUS WITH HYPERGLYCEMIA, WITHOUT LONG-TERM CURRENT USE OF INSULIN (HCC): ICD-10-CM

## 2023-08-24 DIAGNOSIS — I25.10 CORONARY ARTERY DISEASE INVOLVING NATIVE CORONARY ARTERY OF NATIVE HEART WITHOUT ANGINA PECTORIS: Primary | ICD-10-CM

## 2023-08-24 DIAGNOSIS — N18.31 CKD STAGE G3A/A2, GFR 45-59 AND ALBUMIN CREATININE RATIO 30-299 MG/G (HCC): ICD-10-CM

## 2023-08-24 DIAGNOSIS — I25.2 OLD MYOCARDIAL INFARCTION: ICD-10-CM

## 2023-08-24 PROCEDURE — 99214 OFFICE O/P EST MOD 30 MIN: CPT | Performed by: INTERNAL MEDICINE

## 2023-08-24 NOTE — PROGRESS NOTES
[facility]  [address] [facility] [address]  Phone#  [phone]   Fax#  [phone]  *-*-*-*-*-*-*-*-*-*-*-*-*-*-*-*-*-*-*-*-*-*-*-*-*-*-*-*-*-*-*-*-*-*-*-*-*-*-*-*-*-*-*-*-*-*-*-*-*-*-*-*-*-*                                   Cardiology Follow Up      ENCOUNTER DATE: 23 11:09 AM  PATIENT NAME: Julia Hernández   : 1945    MRN: [id number]  AGE:78 y.o. SEX: male  [address], [place]     PRIMARY CARE PHYSICIAN: Patrisha Aase, [place]    ACTIVE DIAGNOSIS THIS VISIT  1. Coronary artery disease involving native coronary artery of native heart without angina pectoris        2. Old myocardial infarction        3. Pure hypercholesterolemia        4. Primary hypertension        5. S/P CABG x 3          6. CKD stage G3a/A2, GFR 45-59 and albumin creatinine ratio  mg/g ([facility])        7. Class 1 obesity        8. Type 2 diabetes mellitus with hyperglycemia, without long-term current use of insulin [facility])          ACTIVE PROBLEM LIST  Patient Active Problem List   Diagnosis   • CAD (coronary artery disease)   • Diverticulosis   • Hypertension   • Hyperlipidemia   • Old myocardial infarction   • S/P CABG x 3     • Thrombocytopenia (HCC)   • Type 2 diabetes mellitus with hyperglycemia, without long-term current use of insulin ([address])   • CKD stage G3a/A2, GFR 45-59 and albumin creatinine ratio  mg/g ([address])   • Need for shingles vaccine   • Class 1 obesity       CARDIOLOGY SPECIALTY COMMENTS  Patient was 1st seen in our office on 2013 for midsternal chest discomfort and dyspnea on exertion. 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 Other diagnoses include hypertension, metabolic syndrome, hyperlipidemia. Cardiac catheterization demonstrated a 70-80% left anterior after the 2nd diagonal branch. Circumflex artery minor luminal irregularities.  Right coronary artery dominant in distribution with a proximal 90% lesion. Patient underwent coronary artery bypass surgery with a LIMA to the LAD, SVG to the right coronary artery, SVG to the OM branch of the circumflex. 03/19/2013 echocardiogram: Normal left ventricular systolic and diastolic function. Mildly elevated left ventricular filling pressures. Aortic sclerosis with mild aortic regurgitation. Moderately severe concentric left ventricular hypertrophy. 10/04/2018 echocardiogram:  Normal left ventricular systolic function EF 12%. Grade 3 diastolic left ventricular dysfunction. Moderate concentric left ventricular hypertrophy. Biatrial enlargement. Aortic sclerosis. INTERVAL HISTORY:        Patient with a history of prior CABG returns. He has no cardiac complaints. Patient denies chest discomfort or shortness of breath. Patient has no palpitations. Patient denies symptoms of dizziness, lightheadedness or near-syncope/syncope. Patient denies leg edema. Patient denies symptoms of orthopnea or paroxysmal nocturnal dyspnea. His blood pressure is good at 130/82. His last total cholesterol was 87, triglycerides 87, HDL 27, and LDL calculated 43.     DISCUSSION/PLAN:          Return in 6 months  EKG on return    Lab Studies:    Lab Results   Component Value Date    CHOLESTEROL 87 02/27/2023    CHOLESTEROL 84 10/27/2022    CHOLESTEROL 82 04/15/2022     Lab Results   Component Value Date    TRIG 87 02/27/2023    TRIG 97 10/27/2022    TRIG 81 04/15/2022     Lab Results   Component Value Date    HDL 27 (L) 02/27/2023    HDL 29 (L) 10/27/2022    HDL 26 (L) 04/15/2022     Lab Results   Component Value Date    LDLCALC 43 02/27/2023    LDLCALC 36 10/27/2022    LDLCALC 40 04/15/2022       Lab Results   Component Value Date    HGBA1C 5.5 02/22/2023      Lab Results   Component Value Date    EGFR 48 02/27/2023    EGFR 45 10/27/2022    EGFR 47 04/15/2022    SODIUM 139 02/27/2023    SODIUM 143 10/27/2022    SODIUM 143 04/15/2022    K 4.9 02/27/2023    K 4.9 10/27/2022    K 4.4 04/15/2022     02/27/2023     10/27/2022     (H) 04/15/2022    CO2 28 02/27/2023    CO2 28 10/27/2022    CO2 25 04/15/2022    ANIONGAP 11 04/27/2018    BUN 19 02/27/2023    BUN 28 (H) 10/27/2022    BUN 23 04/15/2022    CREATININE 1.41 02/27/2023    CREATININE 1.48 10/27/2022    CREATININE 1.42 04/15/2022     Lab Results   Component Value Date    WBC 5.27 02/27/2023    WBC 5.20 01/12/2022    WBC 5.70 05/05/2021    HGB 13.6 02/27/2023    HGB 14.2 01/12/2022    HGB 14.6 05/05/2021    HCT 42.3 02/27/2023    HCT 40.4 (L) 01/12/2022    HCT 43.0 05/05/2021    MCV 95 02/27/2023    MCV 91 01/12/2022    MCV 94 05/05/2021    MCH 30.6 02/27/2023    MCH 32.0 01/12/2022    MCH 32.0 05/05/2021    MCHC 32.2 02/27/2023    MCHC 35.1 01/12/2022    MCHC 34.0 05/05/2021     02/27/2023     01/12/2022     (L) 05/05/2021      Lab Results   Component Value Date    GLUCOSE 90 04/27/2018    CALCIUM 8.7 02/27/2023    CALCIUM 9.3 10/27/2022    CALCIUM 8.4 04/15/2022    AST 30 02/27/2023    AST 35 10/27/2022    AST 28 04/15/2022    ALT 38 02/27/2023    ALT 47 10/27/2022    ALT 34 04/15/2022    ALKPHOS 105 02/27/2023    ALKPHOS 109 10/27/2022    ALKPHOS 104 04/15/2022    PROT 6.6 04/27/2018    BILITOT 0.8 04/27/2018    MG 2.0 11/12/2018     No results found for this visit on 08/24/23.       Current Outpatient Medications:   •  Alcohol Swabs (Comfort Touch Alcohol Prep) 70 % PADS, TEST 3-4 TIMES A DAY AS DIRECTED, Disp: 100 each, Rfl: 3  •  aspirin 81 mg chewable tablet, Chew 1 tablet (81 mg total) every 24 hours, Disp: 90 tablet, Rfl: 3  •  atorvastatin (LIPITOR) 80 mg tablet, TAKE 1 TABLET (80 MG TOTAL) BY MOUTH DAILY, Disp: 90 tablet, Rfl: 3  •  Blood Glucose Calibration (OT ULTRA/FASTTK CNTRL SOLN) SOLN, Use as directed, Disp: , Rfl:   •  clopidogrel (PLAVIX) 75 mg tablet, TAKE 1 TABLET (75 MG TOTAL) BY MOUTH EVERY 24 HOURS, Disp: 90 tablet, Rfl: 3  •  Comfort EZ Pen Needles 32G X 4 MM MISC, TEST 3-4 TIMES A DAY AS DIRECTED, Disp: , Rfl:   •  ezetimibe (ZETIA) 10 mg tablet, TAKE 1 TABLET (10 MG TOTAL) BY MOUTH DAILY, Disp: 90 tablet, Rfl: 3  •  metFORMIN (GLUCOPHAGE) 500 mg tablet, TAKE 1 TABLET (500 MG TOTAL) BY MOUTH 2 (TWO) TIMES A DAY WITH MEALS ., Disp: 180 tablet, Rfl: 3  •  metoprolol succinate (TOPROL-XL) 100 mg 24 hr tablet, TAKE ONE TABLET BY MOUTH DAILY, Disp: 90 tablet, Rfl: 3  •  olmesartan-hydrochlorothiazide (BENICAR HCT) 40-12.5 MG per tablet, TAKE 1 TABLET BY MOUTH DAILY, Disp: 90 tablet, Rfl: 3  •  OneTouch Ultra test strip, TEST 3-4 TIMES A DAY AS DIRECTED, Disp: , Rfl:   •  tamsulosin (FLOMAX) 0.4 mg, Take 1 capsule (0.4 mg total) by mouth daily, Disp: 90 capsule, Rfl: 3  •  Ozempic, 1 MG/DOSE, 4 MG/3ML injection pen, INJECT 1 MG UNDER THE SKIN ONCE A WEEK FOR 24 DOSES (Patient not taking: Reported on 2023), Disp: 3 mL, Rfl: 5  Allergies   Allergen Reactions   • No Active Allergies        Past Medical History:   Diagnosis Date   • CAD (coronary artery disease)    • Coronary artery disease    • Diabetes mellitus (HCC)    • Diverticulosis    • Hyperglycemia    • Hyperlipidemia    • Hypertension    • Obesity      Social History     Socioeconomic History   • Marital status: /Civil Union     Spouse name: Not on file   • Number of children: Not on file   • Years of education: Not on file   • Highest education level: Not on file   Occupational History   • Not on file   Tobacco Use   • Smoking status: Former     Types: Cigarettes     Quit date: 2012     Years since quittin.6   • Smokeless tobacco: Never   Vaping Use   • Vaping Use: Never used   Substance and Sexual Activity   • Alcohol use:  Yes   • Drug use: No   • Sexual activity: Yes     Partners: Female   Other Topics Concern   • Not on file   Social History Narrative   • Not on file     Social Determinants of Health     Financial Resource Strain: Low Risk  (10/19/2022)    Overall Financial Resource Strain (CARDIA)    • Difficulty of Paying Living Expenses: Not hard at all   Food Insecurity: Not on file   Transportation Needs: No Transportation Needs (10/19/2022)    PRAPARE - Transportation    • Lack of Transportation (Medical): No    • Lack of Transportation (Non-Medical): No   Physical Activity: Not on file   Stress: Not on file   Social Connections: Not on file   Intimate Partner Violence: Not on file   Housing Stability: Not on file      Family History   Problem Relation Age of Onset   • Hypertension Mother      Past Surgical History:   Procedure Laterality Date   • CARDIOVASCULAR STRESS TEST  03/20/2013   • COLONOSCOPY      4/8/2020:diverticulosis   • COLONOSCOPY W/ POLYPECTOMY      2/6/17:colon,polyp     • CORONARY ARTERY BYPASS GRAFT  01/21/2016    x3       PREVIOUS WEIGHTS:   Wt Readings from Last 10 Encounters:   08/24/23 84.1 kg (185 lb 6.4 oz)   05/10/23 82.7 kg (182 lb 6.4 oz)   02/22/23 84.4 kg (186 lb)   01/27/23 85.6 kg (188 lb 12.8 oz)   11/01/22 85.3 kg (188 lb)   10/19/22 85.3 kg (188 lb)   06/10/22 84.8 kg (187 lb)   04/14/22 87.1 kg (192 lb)   01/11/22 89.8 kg (198 lb)   11/22/21 88.5 kg (195 lb 3.2 oz)        Review of Systems:  Review of Systems   Constitutional: Negative for activity change. Respiratory: Negative for cough, chest tightness, shortness of breath and wheezing. Cardiovascular: Negative for chest pain, palpitations and leg swelling. Musculoskeletal: Negative for gait problem. Skin: Negative for color change. Neurological: Negative for dizziness, tremors, syncope, weakness, light-headedness and headaches. Psychiatric/Behavioral: Negative for agitation and confusion. Physical Exam:  /82 (BP Location: Right arm, Patient Position: Sitting, Cuff Size: Large)   Pulse (!) 53   Wt 84.1 kg (185 lb 6.4 oz)   SpO2 98%   BMI 32.84 kg/m²     Physical Exam  Vitals reviewed. Constitutional:       General: He is not in acute distress.      Appearance: He is well-developed. HENT:      Head: Normocephalic and atraumatic. Neck:      Thyroid: No thyromegaly. Vascular: No carotid bruit or JVD. Trachea: No tracheal deviation. Cardiovascular:      Rate and Rhythm: Normal rate and regular rhythm. Pulses: Normal pulses. Heart sounds: Normal heart sounds. No murmur heard. No friction rub. No gallop. Pulmonary:      Effort: Pulmonary effort is normal. No respiratory distress. Breath sounds: Normal breath sounds. No wheezing, rhonchi or rales. Chest:      Chest wall: No tenderness. Musculoskeletal:      Cervical back: Normal range of motion and neck supple. Right lower leg: No edema. Left lower leg: No edema. Skin:     General: Skin is warm and dry. Neurological:      General: No focal deficit present. Mental Status: He is alert and oriented to person, place, and time. Psychiatric:         Mood and Affect: Mood normal.         Behavior: Behavior normal.         Thought Content: Thought content normal.         Judgment: Judgment normal.       ======================================================  Imaging:   I have personally reviewed pertinent reports. I spent 30 minutes on the patient's office visit. This time was spent on the day of the visit. I had direct contact with the patient in the office on the day of the visit. Greater than 50% of the total time was spent obtaining a history, examining patient, answering all patient questions, arranging and discussing plan of care with patient as well as directly providing instructions. Additional time then spent on orders and office chart. Portions of the record may have been created with voice recognition software. Occasional wrong word or "sound a like" substitutions may have occurred due to the inherent limitations of voice recognition software. Read the chart carefully and recognize, using context, where substitutions have occurred.     SIGNATURES:   Fallon Ramires Maikel Gonzalez MD

## 2023-09-18 DIAGNOSIS — E11.65 TYPE 2 DIABETES MELLITUS WITH HYPERGLYCEMIA, WITHOUT LONG-TERM CURRENT USE OF INSULIN (HCC): Primary | ICD-10-CM

## 2023-09-18 RX ORDER — BLOOD SUGAR DIAGNOSTIC
STRIP MISCELLANEOUS
Qty: 100 EACH | Refills: 3 | Status: SHIPPED | OUTPATIENT
Start: 2023-09-18

## 2023-10-25 ENCOUNTER — OFFICE VISIT (OUTPATIENT)
Dept: FAMILY MEDICINE CLINIC | Facility: CLINIC | Age: 78
End: 2023-10-25
Payer: COMMERCIAL

## 2023-10-25 VITALS
SYSTOLIC BLOOD PRESSURE: 166 MMHG | TEMPERATURE: 97.9 F | BODY MASS INDEX: 33.31 KG/M2 | HEIGHT: 63 IN | DIASTOLIC BLOOD PRESSURE: 90 MMHG | OXYGEN SATURATION: 98 % | HEART RATE: 72 BPM | RESPIRATION RATE: 16 BRPM | WEIGHT: 188 LBS

## 2023-10-25 DIAGNOSIS — Z23 ENCOUNTER FOR IMMUNIZATION: ICD-10-CM

## 2023-10-25 DIAGNOSIS — E78.00 PURE HYPERCHOLESTEROLEMIA: ICD-10-CM

## 2023-10-25 DIAGNOSIS — N40.0 BENIGN PROSTATIC HYPERPLASIA WITHOUT LOWER URINARY TRACT SYMPTOMS: ICD-10-CM

## 2023-10-25 DIAGNOSIS — I10 ESSENTIAL HYPERTENSION: ICD-10-CM

## 2023-10-25 DIAGNOSIS — R30.0 DYSURIA: ICD-10-CM

## 2023-10-25 DIAGNOSIS — E11.65 TYPE 2 DIABETES MELLITUS WITH HYPERGLYCEMIA, WITHOUT LONG-TERM CURRENT USE OF INSULIN (HCC): ICD-10-CM

## 2023-10-25 DIAGNOSIS — Z00.00 MEDICARE ANNUAL WELLNESS VISIT, SUBSEQUENT: Primary | ICD-10-CM

## 2023-10-25 DIAGNOSIS — I25.10 CORONARY ARTERY DISEASE INVOLVING NATIVE CORONARY ARTERY OF NATIVE HEART WITHOUT ANGINA PECTORIS: ICD-10-CM

## 2023-10-25 LAB
CREAT UR-MCNC: 117.3 MG/DL
MICROALBUMIN UR-MCNC: 49.6 MG/L
MICROALBUMIN/CREAT 24H UR: 42 MG/G CREATININE (ref 0–30)
SL AMB POCT HEMOGLOBIN AIC: 5.7 (ref ?–6.5)

## 2023-10-25 PROCEDURE — 82570 ASSAY OF URINE CREATININE: CPT | Performed by: FAMILY MEDICINE

## 2023-10-25 PROCEDURE — 90662 IIV NO PRSV INCREASED AG IM: CPT | Performed by: FAMILY MEDICINE

## 2023-10-25 PROCEDURE — G0008 ADMIN INFLUENZA VIRUS VAC: HCPCS | Performed by: FAMILY MEDICINE

## 2023-10-25 PROCEDURE — G0439 PPPS, SUBSEQ VISIT: HCPCS | Performed by: FAMILY MEDICINE

## 2023-10-25 PROCEDURE — 99214 OFFICE O/P EST MOD 30 MIN: CPT | Performed by: FAMILY MEDICINE

## 2023-10-25 PROCEDURE — 83036 HEMOGLOBIN GLYCOSYLATED A1C: CPT | Performed by: FAMILY MEDICINE

## 2023-10-25 PROCEDURE — 82043 UR ALBUMIN QUANTITATIVE: CPT | Performed by: FAMILY MEDICINE

## 2023-10-25 RX ORDER — CLOPIDOGREL BISULFATE 75 MG/1
75 TABLET ORAL EVERY 24 HOURS
Qty: 90 TABLET | Refills: 3 | Status: SHIPPED | OUTPATIENT
Start: 2023-10-25

## 2023-10-25 RX ORDER — OLMESARTAN MEDOXOMIL AND HYDROCHLOROTHIAZIDE 40/12.5 40; 12.5 MG/1; MG/1
1 TABLET ORAL DAILY
Qty: 90 TABLET | Refills: 3 | Status: SHIPPED | OUTPATIENT
Start: 2023-10-25

## 2023-10-25 RX ORDER — METOPROLOL SUCCINATE 100 MG/1
100 TABLET, EXTENDED RELEASE ORAL DAILY
Qty: 90 TABLET | Refills: 3 | Status: SHIPPED | OUTPATIENT
Start: 2023-10-25

## 2023-10-25 RX ORDER — TAMSULOSIN HYDROCHLORIDE 0.4 MG/1
0.8 CAPSULE ORAL
Qty: 180 CAPSULE | Refills: 3 | Status: SHIPPED | OUTPATIENT
Start: 2023-10-25 | End: 2024-10-19

## 2023-10-25 RX ORDER — TAMSULOSIN HYDROCHLORIDE 0.4 MG/1
0.4 CAPSULE ORAL DAILY
Qty: 90 CAPSULE | Refills: 3 | Status: SHIPPED | OUTPATIENT
Start: 2023-10-25 | End: 2023-10-25 | Stop reason: SDUPTHER

## 2023-10-25 RX ORDER — EZETIMIBE 10 MG/1
10 TABLET ORAL DAILY
Qty: 90 TABLET | Refills: 3 | Status: SHIPPED | OUTPATIENT
Start: 2023-10-25

## 2023-10-25 RX ORDER — ATORVASTATIN CALCIUM 80 MG/1
80 TABLET, FILM COATED ORAL DAILY
Qty: 90 TABLET | Refills: 3 | Status: SHIPPED | OUTPATIENT
Start: 2023-10-25

## 2023-10-25 RX ORDER — ASPIRIN 81 MG/1
81 TABLET, CHEWABLE ORAL EVERY 24 HOURS
Qty: 90 TABLET | Refills: 3 | Status: SHIPPED | OUTPATIENT
Start: 2023-10-25

## 2023-10-25 NOTE — PATIENT INSTRUCTIONS
Medicare Preventive Visit Patient Instructions  Thank you for completing your Welcome to Medicare Visit or Medicare Annual Wellness Visit today. Your next wellness visit will be due in one year (10/25/2024). The screening/preventive services that you may require over the next 5-10 years are detailed below. Some tests may not apply to you based off risk factors and/or age. Screening tests ordered at today's visit but not completed yet may show as past due. Also, please note that scanned in results may not display below. Preventive Screenings:  Service Recommendations Previous Testing/Comments   Colorectal Cancer Screening  Colonoscopy    Fecal Occult Blood Test (FOBT)/Fecal Immunochemical Test (FIT)  Fecal DNA/Cologuard Test  Flexible Sigmoidoscopy Age: 43-73 years old   Colonoscopy: every 10 years (May be performed more frequently if at higher risk)  OR  FOBT/FIT: every 1 year  OR  Cologuard: every 3 years  OR  Sigmoidoscopy: every 5 years  Screening may be recommended earlier than age 39 if at higher risk for colorectal cancer. Also, an individualized decision between you and your healthcare provider will decide whether screening between the ages of 77-80 would be appropriate.  Colonoscopy: 04/08/2020  FOBT/FIT: Not on file  Cologuard: Not on file  Sigmoidoscopy: Not on file          Prostate Cancer Screening Individualized decision between patient and health care provider in men between ages of 53-66   Medicare will cover every 12 months beginning on the day after your 50th birthday PSA: 2.6 ng/mL     Screening Not Indicated     Hepatitis C Screening Once for adults born between 1945 and 1965  More frequently in patients at high risk for Hepatitis C Hep C Antibody: 04/15/2022    Screening Current   Diabetes Screening 1-2 times per year if you're at risk for diabetes or have pre-diabetes Fasting glucose: 100 mg/dL (2/27/2023)  A1C: 5.5 (2/22/2023)  Screening Not Indicated  History Diabetes   Cholesterol Screening Once every 5 years if you don't have a lipid disorder. May order more often based on risk factors. Lipid panel: 02/27/2023  Screening Not Indicated  History Lipid Disorder      Other Preventive Screenings Covered by Medicare:  Abdominal Aortic Aneurysm (AAA) Screening: covered once if your at risk. You're considered to be at risk if you have a family history of AAA or a male between the age of 70-76 who smoking at least 100 cigarettes in your lifetime. Lung Cancer Screening: covers low dose CT scan once per year if you meet all of the following conditions: (1) Age 48-67; (2) No signs or symptoms of lung cancer; (3) Current smoker or have quit smoking within the last 15 years; (4) You have a tobacco smoking history of at least 20 pack years (packs per day x number of years you smoked); (5) You get a written order from a healthcare provider. Glaucoma Screening: covered annually if you're considered high risk: (1) You have diabetes OR (2) Family history of glaucoma OR (3)  aged 48 and older OR (3)  American aged 72 and older  Osteoporosis Screening: covered every 2 years if you meet one of the following conditions: (1) Have a vertebral abnormality; (2) On glucocorticoid therapy for more than 3 months; (3) Have primary hyperparathyroidism; (4) On osteoporosis medications and need to assess response to drug therapy. HIV Screening: covered annually if you're between the age of 14-79. Also covered annually if you are younger than 13 and older than 72 with risk factors for HIV infection. For pregnant patients, it is covered up to 3 times per pregnancy.     Immunizations:  Immunization Recommendations   Influenza Vaccine Annual influenza vaccination during flu season is recommended for all persons aged >= 6 months who do not have contraindications   Pneumococcal Vaccine   * Pneumococcal conjugate vaccine = PCV13 (Prevnar 13), PCV15 (Vaxneuvance), PCV20 (Prevnar 20)  * Pneumococcal polysaccharide vaccine = PPSV23 (Pneumovax) Adults 34-57 yo with certain risk factors or if 69+ yo  If never received any pneumonia vaccine: recommend Prevnar 20 (PCV20)  Give PCV20 if previously received 1 dose of PCV13 or PPSV23   Hepatitis B Vaccine 3 dose series if at intermediate or high risk (ex: diabetes, end stage renal disease, liver disease)   Respiratory syncytial virus (RSV) Vaccine - COVERED BY MEDICARE PART D  * RSVPreF3 (Arexvy) CDC recommends that adults 61years of age and older may receive a single dose of RSV vaccine using shared clinical decision-making (SCDM)   Tetanus (Td) Vaccine - COST NOT COVERED BY MEDICARE PART B Following completion of primary series, a booster dose should be given every 10 years to maintain immunity against tetanus. Td may also be given as tetanus wound prophylaxis. Tdap Vaccine - COST NOT COVERED BY MEDICARE PART B Recommended at least once for all adults. For pregnant patients, recommended with each pregnancy. Shingles Vaccine (Shingrix) - COST NOT COVERED BY MEDICARE PART B  2 shot series recommended in those 19 years and older who have or will have weakened immune systems or those 50 years and older     Health Maintenance Due:      Topic Date Due   • Colorectal Cancer Screening  04/08/2023   • Hepatitis C Screening  Completed     Immunizations Due:      Topic Date Due   • COVID-19 Vaccine (4 - Pfizer series) 02/12/2022   • Influenza Vaccine (1) 09/01/2023     Advance Directives   What are advance directives? Advance directives are legal documents that state your wishes and plans for medical care. These plans are made ahead of time in case you lose your ability to make decisions for yourself. Advance directives can apply to any medical decision, such as the treatments you want, and if you want to donate organs. What are the types of advance directives? There are many types of advance directives, and each state has rules about how to use them.  You may choose a combination of any of the following:  Living will: This is a written record of the treatment you want. You can also choose which treatments you do not want, which to limit, and which to stop at a certain time. This includes surgery, medicine, IV fluid, and tube feedings. Durable power of  for healthcare [facility]): This is a written record that states who you want to make healthcare choices for you when you are unable to make them for yourself. This person, called a proxy, is usually a family member or a friend. You may choose more than 1 proxy. Do not resuscitate (DNR) order:  A DNR order is used in case your heart stops beating or you stop breathing. It is a request not to have certain forms of treatment, such as CPR. A DNR order may be included in other types of advance directives. Medical directive: This covers the care that you want if you are in a coma, near death, or unable to make decisions for yourself. You can list the treatments you want for each condition. Treatment may include pain medicine, surgery, blood transfusions, dialysis, IV or tube feedings, and a ventilator (breathing machine). Values history: This document has questions about your views, beliefs, and how you feel and think about life. This information can help others choose the care that you would choose. Why are advance directives important? An advance directive helps you control your care. Although spoken wishes may be used, it is better to have your wishes written down. Spoken wishes can be misunderstood, or not followed. Treatments may be given even if you do not want them. An advance directive may make it easier for your family to make difficult choices about your care. Weight Management   Why it is important to manage your weight:  Being overweight increases your risk of health conditions such as heart disease, high blood pressure, type 2 diabetes, and certain types of cancer.  It can also increase your risk for osteoarthritis, sleep apnea, and other respiratory problems. Aim for a slow, steady weight loss. Even a small amount of weight loss can lower your risk of health problems. How to lose weight safely:  A safe and healthy way to lose weight is to eat fewer calories and get regular exercise. You can lose up about 1 pound a week by decreasing the number of calories you eat by 500 calories each day. Healthy meal plan for weight management:  A healthy meal plan includes a variety of foods, contains fewer calories, and helps you stay healthy. A healthy meal plan includes the following:  Eat whole-grain foods more often. A healthy meal plan should contain fiber. Fiber is the part of grains, fruits, and vegetables that is not broken down by your body. Whole-grain foods are healthy and provide extra fiber in your diet. Some examples of whole-grain foods are whole-wheat breads and pastas, oatmeal, brown rice, and bulgur. Eat a variety of vegetables every day. Include dark, leafy greens such as spinach, kale, qian greens, and mustard greens. Eat yellow and orange vegetables such as carrots, sweet potatoes, and winter squash. Eat a variety of fruits every day. Choose fresh or canned fruit (canned in its own juice or light syrup) instead of juice. Fruit juice has very little or no fiber. Eat low-fat dairy foods. Drink fat-free (skim) milk or 1% milk. Eat fat-free yogurt and low-fat cottage cheese. Try low-fat cheeses such as mozzarella and other reduced-fat cheeses. Choose meat and other protein foods that are low in fat. Choose beans or other legumes such as split peas or lentils. Choose fish, skinless poultry (chicken or turkey), or lean cuts of red meat (beef or pork). Before you cook meat or poultry, cut off any visible fat. Use less fat and oil. Try baking foods instead of frying them. Add less fat, such as margarine, sour cream, regular salad dressing and mayonnaise to foods. Eat fewer high-fat foods.  Some examples of high-fat foods include french fries, doughnuts, ice cream, and cakes. Eat fewer sweets. Limit foods and drinks that are high in sugar. This includes candy, cookies, regular soda, and sweetened drinks. Exercise:  Exercise at least 30 minutes per day on most days of the week. Some examples of exercise include walking, biking, dancing, and swimming. You can also fit in more physical activity by taking the stairs instead of the elevator or parking farther away from stores. Ask your healthcare provider about the best exercise plan for you. © Copyright [employer] 2018 Information is for End User's use only and may not be sold, redistributed or otherwise used for commercial purposes.  All illustrations and images included in CareNotes® are the copyrighted property of [employer] or [address] [place] [id number]

## 2023-10-25 NOTE — PROGRESS NOTES
Pratibha Hallman      : 1945      MRN: [id number]  Encounter Provider: Ulises Young MD  Encounter Date: 10/25/2023   Encounter department: [address] [place] [address]     1. Medicare annual wellness visit, subsequent  -     Comprehensive metabolic panel; Future    2. Encounter for immunization  -     influenza vaccine, high-dose, PF 0.7 mL (FLUZONE HIGH-DOSE)    3. BMI 33.0-33.9,adult    4. Type 2 diabetes mellitus with hyperglycemia, without long-term current use of insulin (HCC)  -     Albumin / creatinine urine ratio; Future  -     POCT hemoglobin A1c  -     Albumin / creatinine urine ratio  -     metFORMIN (GLUCOPHAGE) 500 mg tablet; Take 1 tablet (500 mg total) by mouth 2 (two) times a day with meals . 5. Pure hypercholesterolemia  -     ezetimibe (ZETIA) 10 mg tablet; Take 1 tablet (10 mg total) by mouth daily    6. Essential hypertension  -     atorvastatin (LIPITOR) 80 mg tablet; Take 1 tablet (80 mg total) by mouth daily  -     metoprolol succinate (TOPROL-XL) 100 mg 24 hr tablet; Take 1 tablet (100 mg total) by mouth daily  -     olmesartan-hydrochlorothiazide (BENICAR HCT) 40-12.5 MG per tablet; Take 1 tablet by mouth daily    7. Coronary artery disease involving native coronary artery of native heart without angina pectoris  -     aspirin 81 mg chewable tablet; Chew 1 tablet (81 mg total) every 24 hours  -     clopidogrel (PLAVIX) 75 mg tablet; Take 1 tablet (75 mg total) by mouth every 24 hours    8. Benign prostatic hyperplasia without lower urinary tract symptoms  -     tamsulosin (FLOMAX) 0.4 mg; Take 2 capsules (0.8 mg total) by mouth daily with dinner    9. Dysuria  -     UA w Reflex to Microscopic w Reflex to Culture      Subjective: The patient is a 60-year-old male named Neel, here for a Medicare wellness visit and follow-up for diabetes and hypertension. His blood pressure today is 166/90.  His last HbA1c test performed in February 2023 was 5.5. He reports occasional difficulty with urination, describing it as sometimes being restricted and hard to pass. He also mentions a slight odor. He has been taking Flow Max for his prostate. He is also on metoprolol for his heart and metformin to control his blood sugar. He reports having a hearing problem and uses a hearing aid. He is reluctant to replace his current hearing aid due to the high cost. He also reports that his prostate is enlarged, measuring approximately 60 ml. Objective:   /90 (BP Location: Left arm, Patient Position: Sitting, Cuff Size: Standard)   Pulse 72   Temp 97.9 °F (36.6 °C) (Tympanic)   Resp 16   Ht 5' 3" (1.6 m)   Wt 85.3 kg (188 lb)   SpO2 98%   BMI 33.30 kg/m²     Physical examination reveals an enlarged prostate. The patient's blood pressure is elevated at 166/90. Prostate enlarged 60 ml. Assessment & Plan:   1. Diabetes Mellitus (ICD-10 code: E11.9): The patient's last HbA1c was 5.5, which is within the target range for diabetes management. Continue current management with metformin. Monitor blood glucose levels and adjust treatment as necessary. 2. Hypertension (ICD-10 code: I10): The patient's blood pressure is elevated. Continue current management with metoprolol. Monitor blood pressure and adjust treatment as necessary. 3. Benign Prostatic Hyperplasia (ICD-10 code: N40.0): The patient reports occasional difficulty with urination and has an enlarged prostate. Increase the dose of Flomax to two capsules at bedtime to prevent urinary obstruction. Monitor symptoms and consider referral to urology if symptoms worsen or if the patient develops a urinary tract infection. 4. Hearing Loss (ICD-10 code: H91.9): The patient reports having a hearing problem and uses a hearing aid. He is reluctant to replace his current hearing aid due to the high cost. Refer the patient to audiology for further evaluation and management.   5. Risk of Urinary Tract Infection (ICD-10 code: N39.0): Given the patient's enlarged prostate and occasional difficulty with urination, there is a risk of urinary tract infection. Educate the patient on the signs of a urinary tract infection, such as painful urination. Order a urinalysis to screen for infection. The patient will return to the clinic for a follow-up visit in 3 months or sooner if symptoms worsen.     Lorena Bundy MD

## 2023-10-25 NOTE — PROGRESS NOTES
Assessment and Plan:     During this visit, we have a goal to personalize prevention. I discussed with the patient about    Patient Active Problem List   Diagnosis    CAD (coronary artery disease)    Diverticulosis    Hypertension    Hyperlipidemia    Old myocardial infarction    S/P CABG x 3  2016    Thrombocytopenia (HCC)    Type 2 diabetes mellitus with hyperglycemia, without long-term current use of insulin (HCC)    CKD stage G3a/A2, GFR 45-59 and albumin creatinine ratio  mg/g (HCC)    Need for shingles vaccine    Class 1 obesity    and decreased strength--a lifestyle plan to decrease the impact of current problems. I did a Health risk assessment and discussed ways to stay healthier with the patient. We also reviewed the current medications, the need to avoid polypharmacy in his current treatment, and how chronic conditions impact now and later. A recommended healthy diet and exercising as frequently as possible will help better control the patient's chronic conditions, Immunizations, and the need to comply with current CDC recommendations. The patient declined at this time advanced directives. I encouraged against using alcohol, tobacco, and recreational illegal prescribed and non-prescribed drugs. About smoking: recommended avoid exposure to second hand smoking.     Problem List Items Addressed This Visit       CAD (coronary artery disease)    Relevant Medications    aspirin 81 mg chewable tablet    clopidogrel (PLAVIX) 75 mg tablet    metoprolol succinate (TOPROL-XL) 100 mg 24 hr tablet    Hyperlipidemia    Relevant Medications    ezetimibe (ZETIA) 10 mg tablet    atorvastatin (LIPITOR) 80 mg tablet    Type 2 diabetes mellitus with hyperglycemia, without long-term current use of insulin (HCC)    Relevant Medications    metFORMIN (GLUCOPHAGE) 500 mg tablet    Other Relevant Orders    Albumin / creatinine urine ratio    POCT hemoglobin A1c (Completed)     Other Visit Diagnoses       Medicare annual wellness visit, subsequent    -  Primary    Relevant Orders    Comprehensive metabolic panel    Encounter for immunization        Relevant Orders    influenza vaccine, high-dose, PF 0.7 mL (FLUZONE HIGH-DOSE) (Completed)    BMI 33.0-33.9,adult        Essential hypertension        Relevant Medications    atorvastatin (LIPITOR) 80 mg tablet    metoprolol succinate (TOPROL-XL) 100 mg 24 hr tablet    olmesartan-hydrochlorothiazide (BENICAR HCT) 40-12.5 MG per tablet    Benign prostatic hyperplasia without lower urinary tract symptoms        Relevant Medications    tamsulosin (FLOMAX) 0.4 mg            Depression Screening and Follow-up Plan: Patient was screened for depression during today's encounter. They screened negative with a PHQ-2 score of 0. Preventive health issues were discussed with patient, and age appropriate screening tests were ordered as noted in patient's After Visit Summary. Personalized health advice and appropriate referrals for health education or preventive services given if needed, as noted in patient's After Visit Summary.      History of Present Illness:     Patient presents for a Medicare Wellness Visit    HPI   Patient Care Team:  Betito Parham MD as PCP - General (Family Medicine)     Review of Systems:     Review of Systems     Problem List:     Patient Active Problem List   Diagnosis    CAD (coronary artery disease)    Diverticulosis    Hypertension    Hyperlipidemia    Old myocardial infarction    S/P CABG x 3  2016    Thrombocytopenia (HCC)    Type 2 diabetes mellitus with hyperglycemia, without long-term current use of insulin (HCC)    CKD stage G3a/A2, GFR 45-59 and albumin creatinine ratio  mg/g (HCC)    Need for shingles vaccine    Class 1 obesity      Past Medical and Surgical History:     Past Medical History:   Diagnosis Date    CAD (coronary artery disease)     Coronary artery disease     Diabetes mellitus ([address])     Diverticulosis     Hyperglycemia     Hyperlipidemia Hypertension     Obesity      Past Surgical History:   Procedure Laterality Date    CARDIOVASCULAR STRESS TEST  2013    COLONOSCOPY      2020:diverticulosis    COLONOSCOPY W/ POLYPECTOMY      17:colon,polyp      CORONARY ARTERY BYPASS GRAFT  01/21/2016    x3      Family History:     Family History   Problem Relation Age of Onset    Hypertension Mother       Social History:     Social History     Socioeconomic History    Marital status: /Civil Union     Spouse name: None    Number of children: None    Years of education: None    Highest education level: None   Occupational History    None   Tobacco Use    Smoking status: Former     Types: Cigarettes     Quit date: 2012     Years since quittin.8    Smokeless tobacco: Never   Vaping Use    Vaping Use: Never used   Substance and Sexual Activity    Alcohol use: Yes    Drug use: No    Sexual activity: Yes     Partners: Female   Other Topics Concern    None   Social History Narrative    None     Social Determinants of Health     Financial Resource Strain: Low Risk  (10/25/2023)    Overall Financial Resource Strain (CARDIA)     Difficulty of Paying Living Expenses: Not hard at all   Food Insecurity: Not on file   Transportation Needs: No Transportation Needs (10/25/2023)    PRAPARE - Transportation     Lack of Transportation (Medical): No     Lack of Transportation (Non-Medical):  No   Physical Activity: Not on file   Stress: Not on file   Social Connections: Not on file   Intimate Partner Violence: Not on file   Housing Stability: Not on file      Medications and Allergies:     Current Outpatient Medications   Medication Sig Dispense Refill    aspirin 81 mg chewable tablet Chew 1 tablet (81 mg total) every 24 hours 90 tablet 3    atorvastatin (LIPITOR) 80 mg tablet Take 1 tablet (80 mg total) by mouth daily 90 tablet 3    clopidogrel (PLAVIX) 75 mg tablet Take 1 tablet (75 mg total) by mouth every 24 hours 90 tablet 3    ezetimibe (ZETIA) 10 mg tablet Take 1 tablet (10 mg total) by mouth daily 90 tablet 3    metFORMIN (GLUCOPHAGE) 500 mg tablet Take 1 tablet (500 mg total) by mouth 2 (two) times a day with meals . 180 tablet 3    metoprolol succinate (TOPROL-XL) 100 mg 24 hr tablet Take 1 tablet (100 mg total) by mouth daily 90 tablet 3    olmesartan-hydrochlorothiazide (BENICAR HCT) 40-12.5 MG per tablet Take 1 tablet by mouth daily 90 tablet 3    tamsulosin (FLOMAX) 0.4 mg Take 1 capsule (0.4 mg total) by mouth daily 90 capsule 3    Alcohol Swabs (Comfort Touch Alcohol Prep) 70 % PADS TEST 3-4 TIMES A DAY AS DIRECTED 100 each 3    Blood Glucose Calibration (OT ULTRA/FASTTK CNTRL SOLN) SOLN Use as directed      Comfort EZ Pen Needles 32G X 4 MM MISC TEST 3-4 TIMES A DAY AS DIRECTED      OneTouch Ultra test strip TEST 3-4 TIMES A DAY AS DIRECTED 100 each 3     No current facility-administered medications for this visit. Allergies   Allergen Reactions    No Active Allergies       Immunizations:     Immunization History   Administered Date(s) Administered    COVID-19 PFIZER VACCINE 0.3 ML IM 03/11/2021, 04/08/2021, 12/18/2021    H1N1, All Formulations 10/22/2009    INFLUENZA 10/14/2015, 10/14/2015, 11/09/2018, 10/03/2022    Influenza Split 10/30/2013    Influenza, high dose seasonal 0.7 mL 11/09/2018, 11/04/2019, 09/25/2020, 09/30/2021, 10/03/2022, 10/25/2023    Influenza, seasonal, injectable 09/21/2011, 09/25/2012, 10/23/2014    Pneumococcal Conjugate 13-Valent 07/29/2019    Pneumococcal Polysaccharide PPV23 02/02/2012, 04/19/2016    Tdap 04/19/2016    Zoster 05/14/2012      Health Maintenance:         Topic Date Due    Colorectal Cancer Screening  04/08/2023    Hepatitis C Screening  Completed         Topic Date Due    COVID-19 Vaccine (4 - Pfizer series) 02/12/2022      Medicare Screening Tests and Risk Assessments:     Neel is here for his Subsequent Wellness visit.  Last Medicare Wellness visit information reviewed, patient interviewed and updates made to the record today. Health Risk Assessment:   Patient rates overall health as good. Patient feels that their physical health rating is same. Patient is satisfied with their life. Eyesight was rated as same. Hearing was rated as same. Patient feels that their emotional and mental health rating is same. Patients states they are never, rarely angry. Patient states they are sometimes unusually tired/fatigued. Pain experienced in the last 7 days has been some. Patient's pain rating has been 2/10. Patient states that he has experienced no weight loss or gain in last 6 months. Depression Screening:   PHQ-2 Score: 0      Fall Risk Screening: In the past year, patient has experienced: no history of falling in past year      Home Safety:  Patient does not have trouble with stairs inside or outside of their home. Patient has working smoke alarms and has working carbon monoxide detector. Home safety hazards include: none. Nutrition:   Current diet is Diabetic. Medications:   Patient is currently taking over-the-counter supplements. OTC medications include: see medication list. Patient is able to manage medications. Activities of Daily Living (ADLs)/Instrumental Activities of Daily Living (IADLs):   Walk and transfer into and out of bed and chair?: Yes  Dress and groom yourself?: Yes    Bathe or shower yourself?: Yes    Feed yourself?  Yes  Do your laundry/housekeeping?: Yes  Manage your money, pay your bills and track your expenses?: Yes  Make your own meals?: Yes    Do your own shopping?: Yes    Previous Hospitalizations:   Any hospitalizations or ED visits within the last 12 months?: Yes    How many hospitalizations have you had in the last year?: 1-2    Advance Care Planning:   Living will: No    Durable POA for healthcare: No    Advanced directive: No    Advanced directive counseling given: Yes    Five wishes given: No    Patient declined ACP directive: No    End of Life Decisions reviewed with patient: Yes    Provider agrees with end of life decisions: Yes      Cognitive Screening:   Provider or family/friend/caregiver concerned regarding cognition?: No    PREVENTIVE SCREENINGS      Cardiovascular Screening:    General: Screening Not Indicated and History Lipid Disorder    Due for: Lipid Panel      Diabetes Screening:     General: Screening Not Indicated and History Diabetes    Due for: Blood Glucose      Colorectal Cancer Screening:       Due for: FOBT/FIT      Prostate Cancer Screening:    General: Screening Not Indicated      Osteoporosis Screening:    General: Risks and Benefits Discussed    Due for: DXA Axial      Abdominal Aortic Aneurysm (AAA) Screening:    Risk factors include: tobacco use        General: Screening Not Indicated      Lung Cancer Screening:     General: Screening Not Indicated      Hepatitis C Screening:    General: Screening Current    Hep C Screening Accepted: Yes      Screening, Brief Intervention, and Referral to Treatment (SBIRT)    Screening  Typical number of drinks in a day: 0  Typical number of drinks in a week: 0  Interpretation: Low risk drinking behavior. Single Item Drug Screening:  How often have you used an illegal drug (including marijuana) or a prescription medication for non-medical reasons in the past year? never    Single Item Drug Screen Score: 0  Interpretation: Negative screen for possible drug use disorder    Other Counseling Topics:   Alcohol use counseling, car/seat belt/driving safety, skin self-exam, sunscreen and calcium and vitamin D intake and regular weightbearing exercise. No results found.      Physical Exam:     /90 (BP Location: Left arm, Patient Position: Sitting, Cuff Size: Standard)   Pulse 72   Temp 97.9 °F (36.6 °C) (Tympanic)   Resp 16   Ht 5' 3" (1.6 m)   Wt 85.3 kg (188 lb)   SpO2 98%   BMI 33.30 kg/m²     Physical Exam     Patrisha Aase, MD

## 2023-10-26 ENCOUNTER — LAB (OUTPATIENT)
Dept: LAB | Facility: HOSPITAL | Age: 78
End: 2023-10-26
Payer: COMMERCIAL

## 2023-10-26 DIAGNOSIS — Z00.00 MEDICARE ANNUAL WELLNESS VISIT, SUBSEQUENT: ICD-10-CM

## 2023-10-26 LAB
ALBUMIN SERPL BCP-MCNC: 4 G/DL (ref 3.5–5)
ALP SERPL-CCNC: 83 U/L (ref 34–104)
ALT SERPL W P-5'-P-CCNC: 45 U/L (ref 7–52)
ANION GAP SERPL CALCULATED.3IONS-SCNC: 8 MMOL/L
AST SERPL W P-5'-P-CCNC: 24 U/L (ref 13–39)
BILIRUB SERPL-MCNC: 0.54 MG/DL (ref 0.2–1)
BILIRUB UR QL STRIP: NEGATIVE
BUN SERPL-MCNC: 17 MG/DL (ref 5–25)
CALCIUM SERPL-MCNC: 8.9 MG/DL (ref 8.4–10.2)
CHLORIDE SERPL-SCNC: 106 MMOL/L (ref 96–108)
CLARITY UR: CLEAR
CO2 SERPL-SCNC: 27 MMOL/L (ref 21–32)
COLOR UR: NORMAL
CREAT SERPL-MCNC: 1.34 MG/DL (ref 0.6–1.3)
GFR SERPL CREATININE-BSD FRML MDRD: 50 ML/MIN/1.73SQ M
GLUCOSE P FAST SERPL-MCNC: 96 MG/DL (ref 65–99)
GLUCOSE UR STRIP-MCNC: NEGATIVE MG/DL
HGB UR QL STRIP.AUTO: NEGATIVE
KETONES UR STRIP-MCNC: NEGATIVE MG/DL
LEUKOCYTE ESTERASE UR QL STRIP: NEGATIVE
NITRITE UR QL STRIP: NEGATIVE
PH UR STRIP.AUTO: 5 [PH]
POTASSIUM SERPL-SCNC: 4.5 MMOL/L (ref 3.5–5.3)
PROT SERPL-MCNC: 6.6 G/DL (ref 6.4–8.4)
PROT UR STRIP-MCNC: NEGATIVE MG/DL
SODIUM SERPL-SCNC: 141 MMOL/L (ref 135–147)
SP GR UR STRIP.AUTO: 1.01 (ref 1–1.04)
UROBILINOGEN UA: NEGATIVE MG/DL

## 2023-10-26 PROCEDURE — 36415 COLL VENOUS BLD VENIPUNCTURE: CPT

## 2023-10-26 PROCEDURE — 81003 URINALYSIS AUTO W/O SCOPE: CPT | Performed by: FAMILY MEDICINE

## 2023-10-26 PROCEDURE — 80053 COMPREHEN METABOLIC PANEL: CPT

## 2023-11-03 NOTE — RESULT ENCOUNTER NOTE
Please call patient, kidney function improved little. The albumin in the urine increase.   Take the blood pressure medication Olmesartan  as prescribed every day, helps to control albumin in urine

## 2023-12-19 DIAGNOSIS — E11.65 TYPE 2 DIABETES MELLITUS WITH HYPERGLYCEMIA, WITHOUT LONG-TERM CURRENT USE OF INSULIN (HCC): ICD-10-CM

## 2024-02-26 ENCOUNTER — OFFICE VISIT (OUTPATIENT)
Dept: FAMILY MEDICINE CLINIC | Facility: CLINIC | Age: 79
End: 2024-02-26
Payer: COMMERCIAL

## 2024-02-26 ENCOUNTER — TELEPHONE (OUTPATIENT)
Age: 79
End: 2024-02-26

## 2024-02-26 VITALS
BODY MASS INDEX: 35.44 KG/M2 | TEMPERATURE: 96.7 F | DIASTOLIC BLOOD PRESSURE: 82 MMHG | OXYGEN SATURATION: 96 % | SYSTOLIC BLOOD PRESSURE: 152 MMHG | WEIGHT: 200 LBS | RESPIRATION RATE: 17 BRPM | HEIGHT: 63 IN | HEART RATE: 70 BPM

## 2024-02-26 DIAGNOSIS — E78.00 PURE HYPERCHOLESTEROLEMIA: ICD-10-CM

## 2024-02-26 DIAGNOSIS — E66.01 CLASS 2 SEVERE OBESITY DUE TO EXCESS CALORIES WITH SERIOUS COMORBIDITY AND BODY MASS INDEX (BMI) OF 35.0 TO 35.9 IN ADULT: ICD-10-CM

## 2024-02-26 DIAGNOSIS — Z12.5 SCREENING FOR MALIGNANT NEOPLASM OF PROSTATE: ICD-10-CM

## 2024-02-26 DIAGNOSIS — H61.23 IMPACTED CERUMEN OF BOTH EARS: Primary | ICD-10-CM

## 2024-02-26 DIAGNOSIS — I10 PRIMARY HYPERTENSION: ICD-10-CM

## 2024-02-26 DIAGNOSIS — I25.810 CORONARY ARTERY DISEASE INVOLVING CORONARY BYPASS GRAFT OF NATIVE HEART WITHOUT ANGINA PECTORIS: ICD-10-CM

## 2024-02-26 DIAGNOSIS — L30.4 INTERTRIGO OF WEB OF TOE: ICD-10-CM

## 2024-02-26 DIAGNOSIS — N18.31 CKD STAGE G3A/A2, GFR 45-59 AND ALBUMIN CREATININE RATIO 30-299 MG/G (HCC): ICD-10-CM

## 2024-02-26 DIAGNOSIS — E11.9 TYPE 2 DIABETES MELLITUS WITHOUT COMPLICATION, WITHOUT LONG-TERM CURRENT USE OF INSULIN (HCC): ICD-10-CM

## 2024-02-26 DIAGNOSIS — R35.1 NOCTURIA: ICD-10-CM

## 2024-02-26 PROBLEM — H90.6 MIXED CONDUCTIVE AND SENSORINEURAL HEARING LOSS OF BOTH EARS: Status: ACTIVE | Noted: 2024-02-26

## 2024-02-26 PROBLEM — Z23 NEED FOR SHINGLES VACCINE: Status: RESOLVED | Noted: 2021-09-07 | Resolved: 2024-02-26

## 2024-02-26 PROBLEM — E66.812 CLASS 2 SEVERE OBESITY DUE TO EXCESS CALORIES WITH SERIOUS COMORBIDITY AND BODY MASS INDEX (BMI) OF 35.0 TO 35.9 IN ADULT (HCC): Status: ACTIVE | Noted: 2022-06-05

## 2024-02-26 PROCEDURE — 69209 REMOVE IMPACTED EAR WAX UNI: CPT | Performed by: PHYSICIAN ASSISTANT

## 2024-02-26 PROCEDURE — 99214 OFFICE O/P EST MOD 30 MIN: CPT | Performed by: PHYSICIAN ASSISTANT

## 2024-02-26 RX ORDER — LANCETS 30 GAUGE
EACH MISCELLANEOUS
COMMUNITY
Start: 2024-01-10

## 2024-02-26 RX ORDER — CLOTRIMAZOLE 1 %
CREAM (GRAM) TOPICAL 2 TIMES DAILY
Qty: 45 G | Refills: 0 | Status: SHIPPED | OUTPATIENT
Start: 2024-02-26

## 2024-02-26 NOTE — ASSESSMENT & PLAN NOTE
Lab Results   Component Value Date    EGFR 50 10/26/2023    EGFR 48 02/27/2023    EGFR 45 10/27/2022    CREATININE 1.34 (H) 10/26/2023    CREATININE 1.41 02/27/2023    CREATININE 1.48 10/27/2022     Stable, improved kidney function, continue maintain glucose control and blood pressure.

## 2024-02-26 NOTE — ASSESSMENT & PLAN NOTE
18 pound weight gain since last visit, check BMP, consider chest x-ray if shortness of breath, no signs of acute fluid overload.  Recommend diet and exercise.

## 2024-02-26 NOTE — TELEPHONE ENCOUNTER
PA for tirzepatide (Mounjaro) 2.5 MG/0.5ML     Submitted via    []BeLocal-KEY   [x]FileLife-Case ID # [id number]   []Faxed to plan   []Other website   []Phone call Case ID #     Office notes sent, clinical questions answered. Awaiting determination    Turnaround time for your insurance to make a decision on your Prior Authorization can take 7-21 business days.

## 2024-02-26 NOTE — PROGRESS NOTES
Name: Neel Verma      : 1945      MRN: [id number]  Encounter Provider: Bing Alegre PA-C  Encounter Date: 2024   Encounter department: [facility] PRIMARY CARE [facility]    Assessment & Plan     1. Impacted cerumen of both ears  Comments:  Right ear irrigation attempted today, dark hard cerumen present, minimal cerumen removed, follow-up after Debrox x 3 days  Orders:  -     carbamide peroxide (DEBROX) 6.5 % otic solution; Administer 5 drops into both ears every 30 (thirty) days  -     Ear cerumen removal    2. Primary hypertension  Assessment & Plan:  Poorly controlled on olmesartan/HCTZ, metoprolol 100 mg. Continue same dose. Recommend weight loss. Recommend DASH and low salt diet. Discussed importance of diet/exercise.   Consider adding amlodipine pending home trend.  Follow-up in 1 week with blood pressure log.  With large neck circumference, recommend sleep study.    Orders:  -     CBC and differential; Future  -     Comprehensive metabolic panel; Future  -     B-Type Natriuretic Peptide(BNP); Future  -     Ambulatory Referral to Sleep Medicine; Future    3. Pure hypercholesterolemia  Assessment & Plan:  Lab Results   Component Value Date    CHOLESTEROL 87 2023    CHOLESTEROL 84 10/27/2022    CHOLESTEROL 82 04/15/2022     Lab Results   Component Value Date    HDL 27 (L) 2023    HDL 29 (L) 10/27/2022    HDL 26 (L) 04/15/2022     Lab Results   Component Value Date    TRIG 87 2023    TRIG 97 10/27/2022    TRIG 81 04/15/2022     Lab Results   Component Value Date    NONHDLC 55 10/27/2022    NONHDLC 56 04/15/2022    NONHDLC 74 2021     Lab Results   Component Value Date    LDLCALC 43 2023     Continue same dose atorvastatin.    Orders:  -     Lipid Panel with Direct LDL reflex; Future    4. Type 2 diabetes mellitus without complication, without long-term current use of insulin (HCC)  Assessment & Plan:  Well-controlled diabetes on metformin 500 mg twice  daily, previously on Ozempic but no longer can afford co-pay.  Will send Mounjaro instead pending insurance coverage.  Diabetic foot exam performed today.  Lab Results   Component Value Date    HGBA1C 5.7 10/25/2023     Orders:  -     Hemoglobin A1C; Future  -     tirzepatide (Mounjaro) 2.5 MG/0.5ML; Inject 0.5 mL (2.5 mg total) under the skin every 7 days  -     Ambulatory Referral to Sleep Medicine; Future    5. Intertrigo of web of toe  Comments:  Start clotrimazole topically twice daily, maintain clean and dry between toes  Orders:  -     clotrimazole (LOTRIMIN) 1 % cream; Apply topically 2 (two) times a day    6. Coronary artery disease involving coronary bypass graft of native heart without angina pectoris  Assessment & Plan:  No chest pain, continue aspirin, Plavix and statin daily.      7. CKD stage G3a/A2, GFR 45-59 and albumin creatinine ratio  mg/g (HCC)  Assessment & Plan:  Lab Results   Component Value Date    EGFR 50 10/26/2023    EGFR 48 02/27/2023    EGFR 45 10/27/2022    CREATININE 1.34 (H) 10/26/2023    CREATININE 1.41 02/27/2023    CREATININE 1.48 10/27/2022     Stable, improved kidney function, continue maintain glucose control and blood pressure.      8. Class 2 severe obesity due to excess calories with serious comorbidity and body mass index (BMI) of 35.0 to 35.9 in adult   Assessment & Plan:  18 pound weight gain since last visit, check BMP, consider chest x-ray if shortness of breath, no signs of acute fluid overload.  Recommend diet and exercise.    Orders:  -     Ambulatory Referral to Sleep Medicine; Future    9. Nocturia  Assessment & Plan:  Suspect due to BPH, check urine and PSA, and continue Flomax 0.8 mg daily.  Consider urology referral.    Orders:  -     UA w Reflex to Microscopic w Reflex to Culture; Future    10. Screening for malignant neoplasm of prostate  -     PSA, Total Screen; Future           Subjective      Neel is a 78 y.o. male with a h/o CAD s/p CAGBx3,  diabetes, HTN, HLD who presents with earwax impaction and weight gain.  He gained 18 pounds in the past 4 months.  Has not been walking.  Previously was walking 30 minutes daily.  Saw ENT in the past and had earwax removed.  Thinks that his hearing aids are impacting earwax.  Has not tried any drops.  Has been compliant with his blood pressure medicine daily.  Has a blood pressure machine at home but has not been checking.  No leg swelling.  Does feel that he gets more easily fatigued with exertion but no shortness of breath or chest pain.    History was conducted in French without the use of .          Review of Systems   Constitutional:  Negative for fever and unexpected weight change.   HENT:  Positive for hearing loss. Negative for ear pain (Clogged sensation bilaterally).    Respiratory:  Negative for cough, choking, chest tightness, shortness of breath and wheezing.    Cardiovascular:  Negative for chest pain, palpitations and leg swelling.       Current Outpatient Medications on File Prior to Visit   Medication Sig   • Alcohol Swabs (Comfort Touch Alcohol Prep) 70 % PADS TEST 3-4 TIMES A DAY AS DIRECTED   • aspirin 81 mg chewable tablet Chew 1 tablet (81 mg total) every 24 hours   • atorvastatin (LIPITOR) 80 mg tablet Take 1 tablet (80 mg total) by mouth daily   • Blood Glucose Calibration (OT ULTRA/FASTTK CNTRL SOLN) SOLN Use as directed   • clopidogrel (PLAVIX) 75 mg tablet Take 1 tablet (75 mg total) by mouth every 24 hours   • Comfort EZ Pen Needles 32G X 4 MM MISC TEST 3-4 TIMES A DAY AS DIRECTED   • Comfort Touch Plus Lancets 30G MISC TEST 3-4 TIMES A DAY AS DIRECTED   • ezetimibe (ZETIA) 10 mg tablet Take 1 tablet (10 mg total) by mouth daily   • metFORMIN (GLUCOPHAGE) 500 mg tablet Take 1 tablet (500 mg total) by mouth 2 (two) times a day with meals .   • metoprolol succinate (TOPROL-XL) 100 mg 24 hr tablet Take 1 tablet (100 mg total) by mouth daily   • olmesartan-hydrochlorothiazide  "(BENICAR HCT) 40-12.5 MG per tablet Take 1 tablet by mouth daily   • OneTouch Ultra test strip TEST 3-4 TIMES A DAY AS DIRECTED   • tamsulosin (FLOMAX) 0.4 mg Take 2 capsules (0.8 mg total) by mouth daily with dinner       Objective     /82 (BP Location: Right arm, Patient Position: Sitting, Cuff Size: Large)   Pulse 70   Temp (!) 96.7 °F (35.9 °C) (Tympanic)   Resp 17   Ht 5' 3\" (1.6 m)   Wt 90.7 kg (200 lb)   SpO2 96%   BMI 35.43 kg/m²     Physical Exam  Vitals and nursing note reviewed.   Constitutional:       Appearance: Normal appearance. He is obese.   HENT:      Head: Normocephalic and atraumatic.      Right Ear: There is impacted cerumen.      Left Ear: There is impacted cerumen.      Ears:      Comments: Wearing hearing aids bilaterally  Neck:      Comments: 18 inch neck circumference  Cardiovascular:      Rate and Rhythm: Normal rate and regular rhythm.      Pulses: Normal pulses. no weak pulses.           Dorsalis pedis pulses are 2+ on the right side and 2+ on the left side.        Posterior tibial pulses are 2+ on the right side and 2+ on the left side.      Heart sounds: Normal heart sounds.   Pulmonary:      Effort: Pulmonary effort is normal.      Breath sounds: Normal breath sounds.   Musculoskeletal:        Feet:    Feet:      Right foot:      Skin integrity: No ulcer, skin breakdown, erythema, warmth, callus or dry skin.      Left foot:      Skin integrity: No ulcer, skin breakdown, erythema, warmth, callus or dry skin.   Neurological:      Mental Status: He is alert and oriented to person, place, and time. Mental status is at baseline.       Diabetic Foot Exam    Patient's shoes and socks removed.    Right Foot/Ankle   Right Foot Inspection  Skin Exam: skin normal and skin intact. No dry skin, no warmth, no callus, no erythema, no maceration, no abnormal color, no pre-ulcer, no ulcer and no callus.     Toe Exam: ROM and strength within normal limits.     Sensory   Monofilament " "testing: intact    Vascular  Capillary refills: < 3 seconds  The right DP pulse is 2+. The right PT pulse is 2+.     Left Foot/Ankle  Left Foot Inspection  Skin Exam: skin normal and skin intact. No dry skin, no warmth, no erythema, no maceration, normal color, no pre-ulcer, no ulcer and no callus.     Toe Exam: ROM and strength within normal limits.     Sensory   Monofilament testing: intact    Vascular  Capillary refills: < 3 seconds  The left DP pulse is 2+. The left PT pulse is 2+.     Assign Risk Category  No deformity present  No loss of protective sensation  No weak pulses  Risk: 0      Ear cerumen removal    Date/Time: 2/26/2024 8:40 AM    Performed by: Bing Alegre PA-C  Authorized by: Bing Alegre PA-C  Universal Protocol:  Consent: Verbal consent obtained. Written consent not obtained.  Risks and benefits: risks, benefits and alternatives were discussed  Consent given by: patient  Time out: Immediately prior to procedure a \"time out\" was called to verify the correct patient, procedure, equipment, support staff and site/side marked as required.  Timeout called at: 2/26/2024 10:18 AM.  Patient understanding: patient states understanding of the procedure being performed  Patient consent: the patient's understanding of the procedure matches consent given  Procedure consent: procedure consent matches procedure scheduled  Patient identity confirmed: verbally with patient    Patient location:  Clinic  Procedure details:     Local anesthetic:  None    Location:  R ear    Procedure type: irrigation only      Approach:  External  Post-procedure details:     Complication:  None    Hearing quality:  Improved    Patient tolerance of procedure:  Tolerated well, no immediate complications  Comments:      Minimal cerumen removed      Bing Alegre PA-C    "

## 2024-02-26 NOTE — ASSESSMENT & PLAN NOTE
Suspect due to BPH, check urine and PSA, and continue Flomax 0.8 mg daily.  Consider urology referral.

## 2024-02-26 NOTE — ASSESSMENT & PLAN NOTE
Lab Results   Component Value Date    CHOLESTEROL 87 02/27/2023    CHOLESTEROL 84 10/27/2022    CHOLESTEROL 82 04/15/2022     Lab Results   Component Value Date    HDL 27 (L) 02/27/2023    HDL 29 (L) 10/27/2022    HDL 26 (L) 04/15/2022     Lab Results   Component Value Date    TRIG 87 02/27/2023    TRIG 97 10/27/2022    TRIG 81 04/15/2022     Lab Results   Component Value Date    NONHDLC 55 10/27/2022    NONHDLC 56 04/15/2022    NONHDLC 74 09/09/2021     Lab Results   Component Value Date    LDLCALC 43 02/27/2023     Continue same dose atorvastatin.

## 2024-02-26 NOTE — ASSESSMENT & PLAN NOTE
Well-controlled diabetes on metformin 500 mg twice daily, previously on Ozempic but no longer can afford co-pay.  Will send Mounjaro instead pending insurance coverage.  Diabetic foot exam performed today.  Lab Results   Component Value Date    HGBA1C 5.7 10/25/2023

## 2024-02-26 NOTE — ASSESSMENT & PLAN NOTE
Poorly controlled on olmesartan/HCTZ, metoprolol 100 mg. Continue same dose. Recommend weight loss. Recommend DASH and low salt diet. Discussed importance of diet/exercise.   Consider adding amlodipine pending home trend.  Follow-up in 1 week with blood pressure log.  With large neck circumference, recommend sleep study.

## 2024-02-26 NOTE — TELEPHONE ENCOUNTER
PA for tirzepatide (Mounjaro) 2.5 MG/0.5ML  Approved   Date(s) approved 2/26/24-12/31/24  Case #[id number]    Patient advised by [] [employer] Message                      [x] Phone call       Pharmacy advised by [x]Fax                                     []Phone call    Approval letter scanned into [employer] Yes

## 2024-02-27 ENCOUNTER — RA CDI HCC (OUTPATIENT)
Dept: OTHER | Facility: HOSPITAL | Age: 79
End: 2024-02-27

## 2024-02-27 ENCOUNTER — APPOINTMENT (OUTPATIENT)
Dept: LAB | Facility: HOSPITAL | Age: 79
End: 2024-02-27
Payer: COMMERCIAL

## 2024-02-27 DIAGNOSIS — R35.1 NOCTURIA: ICD-10-CM

## 2024-02-27 DIAGNOSIS — Z12.5 SCREENING FOR MALIGNANT NEOPLASM OF PROSTATE: ICD-10-CM

## 2024-02-27 DIAGNOSIS — I10 PRIMARY HYPERTENSION: ICD-10-CM

## 2024-02-27 DIAGNOSIS — E78.00 PURE HYPERCHOLESTEROLEMIA: ICD-10-CM

## 2024-02-27 DIAGNOSIS — E11.9 TYPE 2 DIABETES MELLITUS WITHOUT COMPLICATION, WITHOUT LONG-TERM CURRENT USE OF INSULIN (HCC): ICD-10-CM

## 2024-02-27 LAB
ALBUMIN SERPL BCP-MCNC: 4.2 G/DL (ref 3.5–5)
ALP SERPL-CCNC: 102 U/L (ref 34–104)
ALT SERPL W P-5'-P-CCNC: 52 U/L (ref 7–52)
ANION GAP SERPL CALCULATED.3IONS-SCNC: 9 MMOL/L
AST SERPL W P-5'-P-CCNC: 32 U/L (ref 13–39)
BACTERIA UR QL AUTO: NORMAL /HPF
BASOPHILS # BLD AUTO: 0.03 THOUSANDS/ÂΜL (ref 0–0.1)
BASOPHILS NFR BLD AUTO: 1 % (ref 0–1)
BILIRUB SERPL-MCNC: 0.4 MG/DL (ref 0.2–1)
BILIRUB UR QL STRIP: NEGATIVE
BNP SERPL-MCNC: 102 PG/ML (ref 0–100)
BUN SERPL-MCNC: 21 MG/DL (ref 5–25)
CALCIUM SERPL-MCNC: 9.1 MG/DL (ref 8.4–10.2)
CHLORIDE SERPL-SCNC: 108 MMOL/L (ref 96–108)
CHOLEST SERPL-MCNC: 79 MG/DL
CLARITY UR: CLEAR
CO2 SERPL-SCNC: 26 MMOL/L (ref 21–32)
COLOR UR: ABNORMAL
CREAT SERPL-MCNC: 1.38 MG/DL (ref 0.6–1.3)
EOSINOPHIL # BLD AUTO: 0.21 THOUSAND/ÂΜL (ref 0–0.61)
EOSINOPHIL NFR BLD AUTO: 3 % (ref 0–6)
ERYTHROCYTE [DISTWIDTH] IN BLOOD BY AUTOMATED COUNT: 13.1 % (ref 11.6–15.1)
EST. AVERAGE GLUCOSE BLD GHB EST-MCNC: 148 MG/DL
GFR SERPL CREATININE-BSD FRML MDRD: 48 ML/MIN/1.73SQ M
GLUCOSE P FAST SERPL-MCNC: 105 MG/DL (ref 65–99)
GLUCOSE UR STRIP-MCNC: NEGATIVE MG/DL
HBA1C MFR BLD: 6.8 %
HCT VFR BLD AUTO: 44 % (ref 36.5–49.3)
HDLC SERPL-MCNC: 32 MG/DL
HGB BLD-MCNC: 14.4 G/DL (ref 12–17)
HGB UR QL STRIP.AUTO: NEGATIVE
IMM GRANULOCYTES # BLD AUTO: 0.01 THOUSAND/UL (ref 0–0.2)
IMM GRANULOCYTES NFR BLD AUTO: 0 % (ref 0–2)
KETONES UR STRIP-MCNC: NEGATIVE MG/DL
LDLC SERPL CALC-MCNC: 34 MG/DL (ref 0–100)
LEUKOCYTE ESTERASE UR QL STRIP: NEGATIVE
LYMPHOCYTES # BLD AUTO: 1.2 THOUSANDS/ÂΜL (ref 0.6–4.47)
LYMPHOCYTES NFR BLD AUTO: 19 % (ref 14–44)
MCH RBC QN AUTO: 30.5 PG (ref 26.8–34.3)
MCHC RBC AUTO-ENTMCNC: 32.7 G/DL (ref 31.4–37.4)
MCV RBC AUTO: 93 FL (ref 82–98)
MONOCYTES # BLD AUTO: 0.62 THOUSAND/ÂΜL (ref 0.17–1.22)
MONOCYTES NFR BLD AUTO: 10 % (ref 4–12)
NEUTROPHILS # BLD AUTO: 4.32 THOUSANDS/ÂΜL (ref 1.85–7.62)
NEUTS SEG NFR BLD AUTO: 67 % (ref 43–75)
NITRITE UR QL STRIP: NEGATIVE
NON-SQ EPI CELLS URNS QL MICRO: NORMAL /HPF
NRBC BLD AUTO-RTO: 0 /100 WBCS
PH UR STRIP.AUTO: 6 [PH]
PLATELET # BLD AUTO: 206 THOUSANDS/UL (ref 149–390)
PMV BLD AUTO: 10.9 FL (ref 8.9–12.7)
POTASSIUM SERPL-SCNC: 4.9 MMOL/L (ref 3.5–5.3)
PROT SERPL-MCNC: 7.2 G/DL (ref 6.4–8.4)
PROT UR STRIP-MCNC: ABNORMAL MG/DL
PSA SERPL-MCNC: 7.82 NG/ML (ref 0–4)
RBC # BLD AUTO: 4.72 MILLION/UL (ref 3.88–5.62)
RBC #/AREA URNS AUTO: NORMAL /HPF
SODIUM SERPL-SCNC: 143 MMOL/L (ref 135–147)
SP GR UR STRIP.AUTO: 1.01 (ref 1–1.04)
TRIGL SERPL-MCNC: 63 MG/DL
UROBILINOGEN UA: NEGATIVE MG/DL
WBC # BLD AUTO: 6.39 THOUSAND/UL (ref 4.31–10.16)
WBC #/AREA URNS AUTO: NORMAL /HPF

## 2024-02-27 PROCEDURE — 80061 LIPID PANEL: CPT

## 2024-02-27 PROCEDURE — 81001 URINALYSIS AUTO W/SCOPE: CPT

## 2024-02-27 PROCEDURE — 36415 COLL VENOUS BLD VENIPUNCTURE: CPT

## 2024-02-27 PROCEDURE — G0103 PSA SCREENING: HCPCS

## 2024-02-27 PROCEDURE — 83880 ASSAY OF NATRIURETIC PEPTIDE: CPT

## 2024-02-27 PROCEDURE — 80053 COMPREHEN METABOLIC PANEL: CPT

## 2024-02-27 PROCEDURE — 85025 COMPLETE CBC W/AUTO DIFF WBC: CPT

## 2024-02-27 PROCEDURE — 83036 HEMOGLOBIN GLYCOSYLATED A1C: CPT

## 2024-02-27 PROCEDURE — 81003 URINALYSIS AUTO W/O SCOPE: CPT

## 2024-02-28 DIAGNOSIS — R97.20 ELEVATED PSA: ICD-10-CM

## 2024-02-28 DIAGNOSIS — R35.1 BPH ASSOCIATED WITH NOCTURIA: Primary | ICD-10-CM

## 2024-02-28 DIAGNOSIS — N40.1 BPH ASSOCIATED WITH NOCTURIA: Primary | ICD-10-CM

## 2024-02-29 ENCOUNTER — OFFICE VISIT (OUTPATIENT)
Dept: FAMILY MEDICINE CLINIC | Facility: CLINIC | Age: 79
End: 2024-02-29
Payer: COMMERCIAL

## 2024-02-29 VITALS
OXYGEN SATURATION: 98 % | DIASTOLIC BLOOD PRESSURE: 70 MMHG | WEIGHT: 200 LBS | HEART RATE: 60 BPM | SYSTOLIC BLOOD PRESSURE: 140 MMHG | BODY MASS INDEX: 35.44 KG/M2 | HEIGHT: 63 IN | RESPIRATION RATE: 16 BRPM | TEMPERATURE: 97.9 F

## 2024-02-29 DIAGNOSIS — N40.1 BPH ASSOCIATED WITH NOCTURIA: ICD-10-CM

## 2024-02-29 DIAGNOSIS — I10 PRIMARY HYPERTENSION: ICD-10-CM

## 2024-02-29 DIAGNOSIS — R35.1 BPH ASSOCIATED WITH NOCTURIA: ICD-10-CM

## 2024-02-29 DIAGNOSIS — E11.9 TYPE 2 DIABETES MELLITUS WITHOUT COMPLICATION, WITHOUT LONG-TERM CURRENT USE OF INSULIN (HCC): ICD-10-CM

## 2024-02-29 DIAGNOSIS — N18.31 CKD STAGE G3A/A2, GFR 45-59 AND ALBUMIN CREATININE RATIO 30-299 MG/G (HCC): ICD-10-CM

## 2024-02-29 DIAGNOSIS — H61.23 BILATERAL IMPACTED CERUMEN: Primary | ICD-10-CM

## 2024-02-29 DIAGNOSIS — R97.20 ELEVATED PSA: ICD-10-CM

## 2024-02-29 PROCEDURE — 99214 OFFICE O/P EST MOD 30 MIN: CPT | Performed by: PHYSICIAN ASSISTANT

## 2024-02-29 PROCEDURE — 69210 REMOVE IMPACTED EAR WAX UNI: CPT | Performed by: PHYSICIAN ASSISTANT

## 2024-02-29 RX ORDER — AMLODIPINE BESYLATE 2.5 MG/1
2.5 TABLET ORAL DAILY
Qty: 90 TABLET | Refills: 1 | Status: SHIPPED | OUTPATIENT
Start: 2024-02-29

## 2024-02-29 RX ORDER — HYDROCORTISONE AND ACETIC ACID 20.75; 10.375 MG/ML; MG/ML
3 SOLUTION AURICULAR (OTIC) EVERY 6 HOURS SCHEDULED
Qty: 10 ML | Refills: 0 | Status: SHIPPED | OUTPATIENT
Start: 2024-02-29

## 2024-02-29 NOTE — ASSESSMENT & PLAN NOTE
Lab Results   Component Value Date    PSA 7.82 (H) 02/27/2024    PSA 2.6 07/16/2020    PSA 1.96 04/27/2018     Repeat in 1 month and refer to urology.

## 2024-02-29 NOTE — ASSESSMENT & PLAN NOTE
Consider SGLT2i pending response to restarting Mounjaro, stable kidney function, avoid NSAIDs and nephrotoxins.   Lab Results   Component Value Date    EGFR 48 02/27/2024    EGFR 50 10/26/2023    EGFR 48 02/27/2023    CREATININE 1.38 (H) 02/27/2024    CREATININE 1.34 (H) 10/26/2023    CREATININE 1.41 02/27/2023

## 2024-02-29 NOTE — ASSESSMENT & PLAN NOTE
Previously well-controlled with A1c<6. With recent increase due to poor diet and lack of coverage with Ozempic. Received the Mounjaro and completed first dose of Wednesday, tolerated well with no side effects, continue once weekly. Recommend diet/exercise, patient has improved diet in the past week.   Lab Results   Component Value Date    HGBA1C 6.8 (H) 02/27/2024

## 2024-02-29 NOTE — ASSESSMENT & PLAN NOTE
Elevated BP with home trend 140s-150s. Continue same dose olmesartan/HCTZ and metoprolol. Start amlodipine 2.5mg once daily. Recommend weight loss. Recommend DASH and low salt diet. Discussed importance of diet/exercise.

## 2024-02-29 NOTE — ASSESSMENT & PLAN NOTE
Lab Results   Component Value Date    PSA 7.82 (H) 02/27/2024    PSA 2.6 07/16/2020    PSA 1.96 04/27/2018     Waking up 3-4x a night to pee. Normal urine, elevated PSA, referred to urology today. Continue Flomax 0.8mg daily.

## 2024-02-29 NOTE — PROGRESS NOTES
Name: Neel Verma      : 1945      MRN: [id number]  Encounter Provider: Bing Alegre PA-C  Encounter Date: 2024   Encounter department: [facility] PRIMARY CARE [facility]    Assessment & Plan     1. Bilateral impacted cerumen  Comments:  R ear completely removed with irrigation, s/p debrox, L ear with hairball present, consider ENT referral, start vosol otic x 2-3 days for irritation  Orders:  -     hydrocortisone-acetic acid (VOSOL-HC) otic solution; Administer 3 drops to the right ear every 6 (six) hours X 2 diaz  -     Ear cerumen removal    2. BPH associated with nocturia  Assessment & Plan:  Lab Results   Component Value Date    PSA 7.82 (H) 2024    PSA 2.6 2020    PSA 1.96 2018     Waking up 3-4x a night to pee. Normal urine, elevated PSA, referred to urology today. Continue Flomax 0.8mg daily.       Orders:  -     Ambulatory Referral to Urology; Future    3. Elevated PSA  Assessment & Plan:  Lab Results   Component Value Date    PSA 7.82 (H) 2024    PSA 2.6 2020    PSA 1.96 2018     Repeat in 1 month and refer to urology.     Orders:  -     Basic metabolic panel; Future  -     PSA, total and free; Future    4. Primary hypertension  Assessment & Plan:  Elevated BP with home trend 140s-150s. Continue same dose olmesartan/HCTZ and metoprolol. Start amlodipine 2.5mg once daily. Recommend weight loss. Recommend DASH and low salt diet. Discussed importance of diet/exercise.       Orders:  -     amLODIPine (NORVASC) 2.5 mg tablet; Take 1 tablet (2.5 mg total) by mouth daily [place] 1 tableta cada leonel antes de acostarse para la presion    5. Type 2 diabetes mellitus without complication, without long-term current use of insulin (HCC)  Assessment & Plan:  Previously well-controlled with A1c<6. With recent increase due to poor diet and lack of coverage with Ozempic. Received the Mounjaro and completed first dose of Wednesday, tolerated well with no side  effects, continue once weekly. Recommend diet/exercise, patient has improved diet in the past week.   Lab Results   Component Value Date    HGBA1C 6.8 (H) 02/27/2024       6. CKD stage G3a/A2, GFR 45-59 and albumin creatinine ratio  mg/g ([facility])  Assessment & Plan:  Consider SGLT2i pending response to restarting Mounjaro, stable kidney function, avoid NSAIDs and nephrotoxins.   Lab Results   Component Value Date    EGFR 48 02/27/2024    EGFR 50 10/26/2023    EGFR 48 02/27/2023    CREATININE 1.38 (H) 02/27/2024    CREATININE 1.34 (H) 10/26/2023    CREATININE 1.41 02/27/2023              Nafisa Shaikh is a 78 y.o. male with a h/o CAD s/p CAGBx3, diabetes, HTN, HLD who presents with earwax impaction for 2 day follow-up after debrox.E Eating less now and wants to lose weight. Has been compliant with his blood pressure medicine daily.  BP has been high at home. No leg swelling. Wants to get back to walking. Difficulty hearing L>R with hearing aids due to wax.     History was conducted in [place] without the use of .          Review of Systems   Constitutional:  Negative for fever and unexpected weight change.   HENT:  Positive for hearing loss. Negative for ear discharge and ear pain.    Respiratory:  Negative for shortness of breath.    Cardiovascular:  Negative for chest pain and palpitations.       Current Outpatient Medications on File Prior to Visit   Medication Sig   • Alcohol Swabs (Comfort Touch Alcohol Prep) 70 % PADS TEST 3-4 TIMES A DAY AS DIRECTED   • aspirin 81 mg chewable tablet Chew 1 tablet (81 mg total) every 24 hours   • atorvastatin (LIPITOR) 80 mg tablet Take 1 tablet (80 mg total) by mouth daily   • Blood Glucose Calibration (OT ULTRA/FASTTK CNTRL SOLN) SOLN Use as directed   • carbamide peroxide (DEBROX) 6.5 % otic solution Administer 5 drops into both ears every 30 (thirty) days   • clopidogrel (PLAVIX) 75 mg tablet Take 1 tablet (75 mg total) by mouth every 24 hours   •  "clotrimazole (LOTRIMIN) 1 % cream Apply topically 2 (two) times a day   • Comfort EZ Pen Needles 32G X 4 MM MISC TEST 3-4 TIMES A DAY AS DIRECTED   • Comfort Touch Plus Lancets 30G MISC TEST 3-4 TIMES A DAY AS DIRECTED   • ezetimibe (ZETIA) 10 mg tablet Take 1 tablet (10 mg total) by mouth daily   • metFORMIN (GLUCOPHAGE) 500 mg tablet Take 1 tablet (500 mg total) by mouth 2 (two) times a day with meals .   • metoprolol succinate (TOPROL-XL) 100 mg 24 hr tablet Take 1 tablet (100 mg total) by mouth daily   • olmesartan-hydrochlorothiazide (BENICAR HCT) 40-12.5 MG per tablet Take 1 tablet by mouth daily   • OneTouch Ultra test strip TEST 3-4 TIMES A DAY AS DIRECTED   • tamsulosin (FLOMAX) 0.4 mg Take 2 capsules (0.8 mg total) by mouth daily with dinner   • tirzepatide (Mounjaro) 2.5 MG/0.5ML Inject 0.5 mL (2.5 mg total) under the skin every 7 days       Objective     /70 (BP Location: Left arm, Patient Position: Sitting, Cuff Size: Large)   Pulse 60   Temp 97.9 °F (36.6 °C) (Tympanic)   Resp 16   Ht 5' 3\" (1.6 m)   Wt 90.7 kg (200 lb)   SpO2 98%   BMI 35.43 kg/m²     Physical Exam  Vitals and nursing note reviewed.   Constitutional:       Appearance: Normal appearance. He is obese.   HENT:      Head: Normocephalic and atraumatic.      Right Ear: There is impacted cerumen.      Left Ear: There is impacted cerumen.      Ears:      Comments: R TM normal appearing s/p cerumen removed  L TM not visualized  Cardiovascular:      Rate and Rhythm: Normal rate and regular rhythm.      Pulses: Normal pulses.      Heart sounds: Normal heart sounds.   Pulmonary:      Effort: Pulmonary effort is normal.      Breath sounds: Normal breath sounds.   Neurological:      Mental Status: He is alert and oriented to person, place, and time. Mental status is at baseline.       Ear cerumen removal    Date/Time: 2/29/2024 8:00 AM    Performed by: Bing Alegre PA-C  Authorized by: Bing Alegre PA-C  Universal Protocol:  Consent: " "Verbal consent obtained. Written consent not obtained.  Risks and benefits: risks, benefits and alternatives were discussed  Consent given by: patient  Time out: Immediately prior to procedure a \"time out\" was called to verify the correct patient, procedure, equipment, support staff and site/side marked as required.  Timeout called at: 2/29/2024 9:11 AM.  Patient understanding: patient states understanding of the procedure being performed  Patient consent: the patient's understanding of the procedure matches consent given  Procedure consent: procedure consent matches procedure scheduled  Patient identity confirmed: verbally with patient    Patient location:  Clinic  Procedure details:     Local anesthetic:  None    Location:  R ear and L ear    Procedure type: irrigation with instrumentation      Instrumentation: curette      Approach:  External  Post-procedure details:     Complication:  None and macerated skin    Hearing quality:  Improved    Patient tolerance of procedure:  Tolerated well, no immediate complications  Comments:      R side completely removed, normal appearing TM post procedure  L side incompletely removed, with hair present balled into cerumen causing obstruction, TM not visualized       Bing Alegre PA-C    "

## 2024-03-02 DIAGNOSIS — G47.39 OTHER SLEEP APNEA: Primary | ICD-10-CM

## 2024-03-02 DIAGNOSIS — E66.01 CLASS 2 SEVERE OBESITY DUE TO EXCESS CALORIES WITH SERIOUS COMORBIDITY AND BODY MASS INDEX (BMI) OF 35.0 TO 35.9 IN ADULT: ICD-10-CM

## 2024-03-03 DIAGNOSIS — E11.65 TYPE 2 DIABETES MELLITUS WITH HYPERGLYCEMIA, WITHOUT LONG-TERM CURRENT USE OF INSULIN (HCC): ICD-10-CM

## 2024-03-04 DIAGNOSIS — R35.1 BPH ASSOCIATED WITH NOCTURIA: Primary | ICD-10-CM

## 2024-03-04 DIAGNOSIS — N40.1 BPH ASSOCIATED WITH NOCTURIA: Primary | ICD-10-CM

## 2024-03-04 DIAGNOSIS — R97.20 ELEVATED PSA: ICD-10-CM

## 2024-03-12 ENCOUNTER — APPOINTMENT (OUTPATIENT)
Dept: LAB | Facility: HOSPITAL | Age: 79
End: 2024-03-12
Payer: COMMERCIAL

## 2024-03-12 DIAGNOSIS — R97.20 ELEVATED PSA: ICD-10-CM

## 2024-03-12 LAB
ANION GAP SERPL CALCULATED.3IONS-SCNC: 6 MMOL/L (ref 4–13)
BUN SERPL-MCNC: 20 MG/DL (ref 5–25)
CALCIUM SERPL-MCNC: 8.9 MG/DL (ref 8.4–10.2)
CHLORIDE SERPL-SCNC: 105 MMOL/L (ref 96–108)
CO2 SERPL-SCNC: 28 MMOL/L (ref 21–32)
CREAT SERPL-MCNC: 1.4 MG/DL (ref 0.6–1.3)
GFR SERPL CREATININE-BSD FRML MDRD: 48 ML/MIN/1.73SQ M
GLUCOSE P FAST SERPL-MCNC: 108 MG/DL (ref 65–99)
POTASSIUM SERPL-SCNC: 4.7 MMOL/L (ref 3.5–5.3)
SODIUM SERPL-SCNC: 139 MMOL/L (ref 135–147)

## 2024-03-12 PROCEDURE — 36415 COLL VENOUS BLD VENIPUNCTURE: CPT

## 2024-03-12 PROCEDURE — 84153 ASSAY OF PSA TOTAL: CPT

## 2024-03-12 PROCEDURE — 84154 ASSAY OF PSA FREE: CPT

## 2024-03-12 PROCEDURE — 80048 BASIC METABOLIC PNL TOTAL CA: CPT

## 2024-03-13 LAB
PSA FREE MFR SERPL: 24.4 %
PSA FREE SERPL-MCNC: 0.78 NG/ML
PSA SERPL-MCNC: 3.2 NG/ML (ref 0–4)

## 2024-03-22 DIAGNOSIS — E11.9 TYPE 2 DIABETES MELLITUS WITHOUT COMPLICATION, WITHOUT LONG-TERM CURRENT USE OF INSULIN (HCC): ICD-10-CM

## 2024-04-04 DIAGNOSIS — L30.4 INTERTRIGO OF WEB OF TOE: ICD-10-CM

## 2024-04-04 RX ORDER — CLOTRIMAZOLE 1 %
CREAM (GRAM) TOPICAL 2 TIMES DAILY
Qty: 45 G | Refills: 0 | Status: SHIPPED | OUTPATIENT
Start: 2024-04-04

## 2024-04-08 ENCOUNTER — APPOINTMENT (EMERGENCY)
Dept: CT IMAGING | Facility: HOSPITAL | Age: 79
End: 2024-04-08
Payer: COMMERCIAL

## 2024-04-08 ENCOUNTER — HOSPITAL ENCOUNTER (EMERGENCY)
Facility: HOSPITAL | Age: 79
Discharge: HOME | End: 2024-04-09
Attending: EMERGENCY MEDICINE
Payer: COMMERCIAL

## 2024-04-08 VITALS
BODY MASS INDEX: 35.5 KG/M2 | HEART RATE: 99 BPM | WEIGHT: 200.4 LBS | RESPIRATION RATE: 16 BRPM | SYSTOLIC BLOOD PRESSURE: 131 MMHG | TEMPERATURE: 98.6 F | DIASTOLIC BLOOD PRESSURE: 71 MMHG | OXYGEN SATURATION: 95 %

## 2024-04-08 DIAGNOSIS — M54.9 BACK PAIN: Primary | ICD-10-CM

## 2024-04-08 DIAGNOSIS — R93.5 ABNORMAL CT OF THE ABDOMEN: ICD-10-CM

## 2024-04-08 DIAGNOSIS — R79.89 ELEVATED SERUM CREATININE: ICD-10-CM

## 2024-04-08 DIAGNOSIS — H61.23 BILATERAL IMPACTED CERUMEN: ICD-10-CM

## 2024-04-08 DIAGNOSIS — K80.20 CHOLELITHIASIS: ICD-10-CM

## 2024-04-08 LAB
ALBUMIN SERPL BCP-MCNC: 4 G/DL (ref 3.5–5)
ALP SERPL-CCNC: 83 U/L (ref 34–104)
ALT SERPL W P-5'-P-CCNC: 41 U/L (ref 7–52)
ANION GAP SERPL CALCULATED.3IONS-SCNC: 10 MMOL/L (ref 4–13)
AST SERPL W P-5'-P-CCNC: 22 U/L (ref 13–39)
ATRIAL RATE: 91 BPM
BASOPHILS # BLD MANUAL: 0 THOUSAND/UL (ref 0–0.1)
BASOPHILS NFR MAR MANUAL: 0 % (ref 0–1)
BILIRUB SERPL-MCNC: 0.79 MG/DL (ref 0.2–1)
BILIRUB UR QL STRIP: NEGATIVE
BUN SERPL-MCNC: 39 MG/DL (ref 5–25)
CALCIUM SERPL-MCNC: 8.2 MG/DL (ref 8.4–10.2)
CARDIAC TROPONIN I PNL SERPL HS: 5 NG/L
CHLORIDE SERPL-SCNC: 107 MMOL/L (ref 96–108)
CLARITY UR: CLEAR
CO2 SERPL-SCNC: 19 MMOL/L (ref 21–32)
COLOR UR: YELLOW
CREAT SERPL-MCNC: 1.8 MG/DL (ref 0.6–1.3)
EOSINOPHIL # BLD MANUAL: 0.08 THOUSAND/UL (ref 0–0.4)
EOSINOPHIL NFR BLD MANUAL: 1 % (ref 0–6)
ERYTHROCYTE [DISTWIDTH] IN BLOOD BY AUTOMATED COUNT: 13.7 % (ref 11.6–15.1)
GFR SERPL CREATININE-BSD FRML MDRD: 35 ML/MIN/1.73SQ M
GLUCOSE SERPL-MCNC: 168 MG/DL (ref 65–140)
GLUCOSE UR STRIP-MCNC: NEGATIVE MG/DL
HCT VFR BLD AUTO: 44.3 % (ref 36.5–49.3)
HGB BLD-MCNC: 14.8 G/DL (ref 12–17)
HGB UR QL STRIP.AUTO: NEGATIVE
KETONES UR STRIP-MCNC: NEGATIVE MG/DL
LEUKOCYTE ESTERASE UR QL STRIP: NEGATIVE
LYMPHOCYTES # BLD AUTO: 0.23 THOUSAND/UL (ref 0.6–4.47)
LYMPHOCYTES # BLD AUTO: 3 % (ref 14–44)
MCH RBC QN AUTO: 30.5 PG (ref 26.8–34.3)
MCHC RBC AUTO-ENTMCNC: 33.4 G/DL (ref 31.4–37.4)
MCV RBC AUTO: 91 FL (ref 82–98)
MONOCYTES # BLD AUTO: 0.23 THOUSAND/UL (ref 0–1.22)
MONOCYTES NFR BLD: 3 % (ref 4–12)
NEUTROPHILS # BLD MANUAL: 7.03 THOUSAND/UL (ref 1.85–7.62)
NEUTS BAND NFR BLD MANUAL: 6 % (ref 0–8)
NEUTS SEG NFR BLD AUTO: 87 % (ref 43–75)
NITRITE UR QL STRIP: NEGATIVE
P AXIS: 51 DEGREES
PH UR STRIP.AUTO: 5.5 [PH]
PLATELET # BLD AUTO: 159 THOUSANDS/UL (ref 149–390)
PLATELET BLD QL SMEAR: ADEQUATE
PMV BLD AUTO: 11.2 FL (ref 8.9–12.7)
POTASSIUM SERPL-SCNC: 4.2 MMOL/L (ref 3.5–5.3)
PR INTERVAL: 250 MS
PROT SERPL-MCNC: 6.9 G/DL (ref 6.4–8.4)
PROT UR STRIP-MCNC: ABNORMAL MG/DL
QRS AXIS: 162 DEGREES
QRSD INTERVAL: 80 MS
QT INTERVAL: 338 MS
QTC INTERVAL: 415 MS
RBC # BLD AUTO: 4.86 MILLION/UL (ref 3.88–5.62)
RBC MORPH BLD: NORMAL
SODIUM SERPL-SCNC: 136 MMOL/L (ref 135–147)
SP GR UR STRIP.AUTO: >=1.05 (ref 1–1.03)
T WAVE AXIS: 35 DEGREES
UROBILINOGEN UR STRIP-ACNC: <2 MG/DL
VENTRICULAR RATE: 91 BPM
WBC # BLD AUTO: 7.56 THOUSAND/UL (ref 4.31–10.16)

## 2024-04-08 PROCEDURE — 74177 CT ABD & PELVIS W/CONTRAST: CPT

## 2024-04-08 PROCEDURE — 81001 URINALYSIS AUTO W/SCOPE: CPT

## 2024-04-08 PROCEDURE — 84484 ASSAY OF TROPONIN QUANT: CPT

## 2024-04-08 PROCEDURE — 36415 COLL VENOUS BLD VENIPUNCTURE: CPT

## 2024-04-08 PROCEDURE — 93005 ELECTROCARDIOGRAM TRACING: CPT

## 2024-04-08 PROCEDURE — 71260 CT THORAX DX C+: CPT

## 2024-04-08 PROCEDURE — 85027 COMPLETE CBC AUTOMATED: CPT

## 2024-04-08 PROCEDURE — 80053 COMPREHEN METABOLIC PANEL: CPT

## 2024-04-08 PROCEDURE — 85007 BL SMEAR W/DIFF WBC COUNT: CPT

## 2024-04-08 PROCEDURE — 93010 ELECTROCARDIOGRAM REPORT: CPT | Performed by: STUDENT IN AN ORGANIZED HEALTH CARE EDUCATION/TRAINING PROGRAM

## 2024-04-08 RX ORDER — HYDROCORTISONE AND ACETIC ACID 20.75; 10.375 MG/ML; MG/ML
3 SOLUTION AURICULAR (OTIC) EVERY 6 HOURS SCHEDULED
Qty: 10 ML | Refills: 0 | Status: SHIPPED | OUTPATIENT
Start: 2024-04-08 | End: 2024-04-12 | Stop reason: ALTCHOICE

## 2024-04-08 RX ADMIN — IOHEXOL 100 ML: 350 INJECTION, SOLUTION INTRAVENOUS at 22:40

## 2024-04-08 RX ADMIN — SODIUM CHLORIDE 1000 ML: 0.9 INJECTION, SOLUTION INTRAVENOUS at 22:46

## 2024-04-09 LAB
2HR DELTA HS TROPONIN: 0 NG/L
ATRIAL RATE: 326 BPM
BACTERIA UR QL AUTO: NORMAL /HPF
CARDIAC TROPONIN I PNL SERPL HS: 5 NG/L
NON-SQ EPI CELLS URNS QL MICRO: NORMAL /HPF
QRS AXIS: -44 DEGREES
QRSD INTERVAL: 90 MS
QT INTERVAL: 372 MS
QTC INTERVAL: 432 MS
RBC #/AREA URNS AUTO: NORMAL /HPF
T WAVE AXIS: -13 DEGREES
VENTRICULAR RATE: 81 BPM
WBC #/AREA URNS AUTO: NORMAL /HPF

## 2024-04-09 PROCEDURE — 84484 ASSAY OF TROPONIN QUANT: CPT

## 2024-04-09 PROCEDURE — 36415 COLL VENOUS BLD VENIPUNCTURE: CPT

## 2024-04-09 PROCEDURE — 93010 ELECTROCARDIOGRAM REPORT: CPT | Performed by: STUDENT IN AN ORGANIZED HEALTH CARE EDUCATION/TRAINING PROGRAM

## 2024-04-09 PROCEDURE — 93005 ELECTROCARDIOGRAM TRACING: CPT

## 2024-04-09 RX ORDER — SILODOSIN 8 MG/1
8 CAPSULE ORAL DAILY
COMMUNITY
Start: 2024-03-20 | End: 2025-03-20

## 2024-04-09 NOTE — ED PROVIDER NOTES
History  Chief Complaint   Patient presents with    Hip Pain     Pt reports bilateral hip pain starting today in the morning. States he was moving heavy things today. Tylenol pta an hour ago. No falls pta     HPI  Patient is a 79 y.o. male with history of CAD, DM presenting to the emergency department for back pain and fever. Patient states that he sometimes has pain in his back which is worse with walking. Yesterday morning he developed pain that is worse on the right side of his back, and was worsened after he did heavy lifting. His pain has continued today. Per his son the patient also had a fever of 101.6 orally at 530pm tonight, patient was then given tylenol and prior to arrival, which also helped with his back pain. Patient denies having any pain currently. He does also complain of having intermittent pain with urination, and shortness of breath, but denies having either currently.  He denies having any chest pain, abdominal pain, headache, focal weakness, or rash.     Prior to Admission Medications   Prescriptions Last Dose Informant Patient Reported? Taking?   Alcohol Swabs (Comfort Touch Alcohol Prep) 70 % PADS   No No   Sig: TEST 3-4 TIMES A DAY AS DIRECTED   Blood Glucose Calibration (OT ULTRA/FASTTK CNTRL SOLN) SOLN   Yes No   Sig: Use as directed   Comfort EZ Pen Needles 32G X 4 MM MISC   Yes No   Sig: TEST 3-4 TIMES A DAY AS DIRECTED   Comfort Touch Plus Lancets 30G MISC   Yes No   Sig: TEST 3-4 TIMES A DAY AS DIRECTED   OneTouch Ultra test strip   No No   Sig: TEST 3-4 TIMES A DAY AS DIRECTED   amLODIPine (NORVASC) 2.5 mg tablet   No No   Sig: Take 1 tablet (2.5 mg total) by mouth daily Colp 1 tableta cada leonel antes de acostarse para la presion   aspirin 81 mg chewable tablet   No No   Sig: Chew 1 tablet (81 mg total) every 24 hours   atorvastatin (LIPITOR) 80 mg tablet   No No   Sig: Take 1 tablet (80 mg total) by mouth daily   carbamide peroxide (DEBROX) 6.5 % otic solution   No No   Sig:  Administer 5 drops into both ears every 30 (thirty) days   clopidogrel (PLAVIX) 75 mg tablet   No No   Sig: Take 1 tablet (75 mg total) by mouth every 24 hours   clotrimazole (LOTRIMIN) 1 % cream   No No   Sig: APPLY TOPICALLY 2 (TWO) TIMES A DAY   ezetimibe (ZETIA) 10 mg tablet   No No   Sig: Take 1 tablet (10 mg total) by mouth daily   hydrocortisone-acetic acid (VOSOL-HC) otic solution   No No   Sig: ADMINISTER 3 DROPS TO THE RIGHT EAR EVERY 6 (SIX) HOURS X 2 ORELLANA   metFORMIN (GLUCOPHAGE) 500 mg tablet   No No   Sig: Take 1 tablet (500 mg total) by mouth 2 (two) times a day with meals .   metoprolol succinate (TOPROL-XL) 100 mg 24 hr tablet   No No   Sig: Take 1 tablet (100 mg total) by mouth daily   olmesartan-hydrochlorothiazide (BENICAR HCT) 40-12.5 MG per tablet   No No   Sig: Take 1 tablet by mouth daily   tamsulosin (FLOMAX) 0.4 mg   No No   Sig: Take 2 capsules (0.8 mg total) by mouth daily with dinner   tirzepatide (Mounjaro) 2.5 MG/0.5ML   No No   Sig: Inject 0.5 mL (2.5 mg total) under the skin every 7 days      Facility-Administered Medications: None       Past Medical History:   Diagnosis Date    CAD (coronary artery disease)     Coronary artery disease     Diabetes mellitus (HCC)     Diverticulosis     Hyperglycemia     Hyperlipidemia     Hypertension     Need for shingles vaccine     Obesity     Thrombocytopenia (HCC)        Past Surgical History:   Procedure Laterality Date    CARDIOVASCULAR STRESS TEST  03/20/2013    COLONOSCOPY      4/8/2020:diverticulosis    COLONOSCOPY W/ POLYPECTOMY      2/6/17:colon,polyp      CORONARY ARTERY BYPASS GRAFT  01/21/2016    x3       Family History   Problem Relation Age of Onset    Hypertension Mother      I have reviewed and agree with the history as documented.    E-Cigarette/Vaping    E-Cigarette Use Never User      E-Cigarette/Vaping Substances     Social History     Tobacco Use    Smoking status: Former     Current packs/day: 0.00     Types: Cigarettes      Quit date: 2012     Years since quittin.2    Smokeless tobacco: Never   Vaping Use    Vaping status: Never Used   Substance Use Topics    Alcohol use: Yes    Drug use: No        Review of Systems   Constitutional:  Positive for fever.   Respiratory:  Positive for shortness of breath.    Cardiovascular:  Negative for chest pain.   Gastrointestinal:  Negative for abdominal pain and vomiting.   Genitourinary:  Positive for dysuria.   Musculoskeletal:  Positive for back pain.   Neurological:  Negative for headaches.   All other systems reviewed and are negative.      Physical Exam  ED Triage Vitals [24]   Temperature Pulse Respirations Blood Pressure SpO2   98.6 °F (37 °C) 99 16 131/71 95 %      Temp Source Heart Rate Source Patient Position - Orthostatic VS BP Location FiO2 (%)   Oral Monitor Sitting Right arm --      Pain Score       6             Orthostatic Vital Signs  Vitals:    24   BP: 131/71   Pulse: 99   Patient Position - Orthostatic VS: Sitting       Physical Exam  Vitals and nursing note reviewed.   Constitutional:       Appearance: Normal appearance.   HENT:      Head: Normocephalic and atraumatic.      Mouth/Throat:      Mouth: Mucous membranes are moist.   Eyes:      Conjunctiva/sclera: Conjunctivae normal.   Cardiovascular:      Rate and Rhythm: Normal rate and regular rhythm.      Pulses: Normal pulses.      Heart sounds: Normal heart sounds.   Pulmonary:      Effort: Pulmonary effort is normal.      Breath sounds: Normal breath sounds.      Comments: Lungs clear bilaterally, patient with heavier breathing upon standing / walking in exam room  Abdominal:      General: There is distension.      Palpations: Abdomen is soft.      Comments: TTP right CVA / right paraspinal area   Musculoskeletal:      Cervical back: Neck supple.      Comments: Right paralumbar TTP, no midline bony TTP   Skin:     General: Skin is warm and dry.      Capillary Refill: Capillary refill takes  less than 2 seconds.   Neurological:      General: No focal deficit present.      Mental Status: He is alert. Mental status is at baseline.   Psychiatric:         Mood and Affect: Mood normal.         ED Medications  Medications   sodium chloride 0.9 % bolus 1,000 mL (1,000 mL Intravenous New Bag 4/8/24 2246)   iohexol (OMNIPAQUE) 350 MG/ML injection (MULTI-DOSE) 100 mL (100 mL Intravenous Given 4/8/24 2240)       Diagnostic Studies  Results Reviewed       Procedure Component Value Units Date/Time    HS Troponin I 2hr [[id number]]  (Normal) Collected: 04/09/24 0009    Lab Status: Final result Specimen: Blood from Arm, Left Updated: 04/09/24 0038     hs TnI 2hr 5 ng/L      Delta 2hr hsTnI 0 ng/L     Urine Microscopic [[id number]]  (Normal) Collected: 04/08/24 2258    Lab Status: Final result Specimen: Urine, Other Updated: 04/09/24 0022     RBC, UA 1-2 /hpf      WBC, UA 0-1 /hpf      Epithelial Cells Occasional /hpf      Bacteria, UA Occasional /hpf     HS Troponin I 4hr [[id number]]     Lab Status: No result Specimen: Blood     UA w Reflex to Microscopic w Reflex to Culture [[id number]]  (Abnormal) Collected: 04/08/24 2258    Lab Status: Final result Specimen: Urine, Other Updated: 04/08/24 2313     Color, UA Yellow     Clarity, UA Clear     Specific Gravity, UA >=1.050     pH, UA 5.5     Leukocytes, UA Negative     Nitrite, UA Negative     Protein, UA Trace mg/dl      Glucose, UA Negative mg/dl      Ketones, UA Negative mg/dl      Urobilinogen, UA <2.0 mg/dl      Bilirubin, UA Negative     Occult Blood, UA Negative    HS Troponin 0hr (reflex protocol) [[id number]]  (Normal) Collected: 04/08/24 2155    Lab Status: Final result Specimen: Blood from Arm, Left Updated: 04/08/24 2226     hs TnI 0hr 5 ng/L     Manual Differential(PHLEBS Do Not Order) [[id number]]  (Abnormal) Collected: 04/08/24 2155    Lab Status: Final result Specimen: Blood from Arm, Left Updated: 04/08/24 2221     Segmented % 87 %      Bands % 6 %       Lymphocytes % 3 %      Monocytes % 3 %      Eosinophils % 1 %      Basophils % 0 %      Absolute Neutrophils 7.03 Thousand/uL      Absolute Lymphocytes 0.23 Thousand/uL      Absolute Monocytes 0.23 Thousand/uL      Absolute Eosinophils 0.08 Thousand/uL      Absolute Basophils 0.00 Thousand/uL      Total Counted --     RBC Morphology Normal     Platelet Estimate Adequate    Comprehensive metabolic panel [[id number]]  (Abnormal) Collected: 04/08/24 2155    Lab Status: Final result Specimen: Blood from Arm, Left Updated: 04/08/24 2218     Sodium 136 mmol/L      Potassium 4.2 mmol/L      Chloride 107 mmol/L      CO2 19 mmol/L      ANION GAP 10 mmol/L      BUN 39 mg/dL      Creatinine 1.80 mg/dL      Glucose 168 mg/dL      Calcium 8.2 mg/dL      AST 22 U/L      ALT 41 U/L      Alkaline Phosphatase 83 U/L      Total Protein 6.9 g/dL      Albumin 4.0 g/dL      Total Bilirubin 0.79 mg/dL      eGFR 35 ml/min/1.73sq m     Narrative:      National Kid[employer] guidelines for Chronic Kidney Disease (CKD):     Stage 1 with normal or high GFR (GFR > 90 mL/min/1.73 square meters)    Stage 2 Mild CKD (GFR = 60-89 mL/min/1.73 square meters)    Stage 3A Moderate CKD (GFR = 45-59 mL/min/1.73 square meters)    Stage 3B Moderate CKD (GFR = 30-44 mL/min/1.73 square meters)    Stage 4 Severe CKD (GFR = 15-29 mL/min/1.73 square meters)    Stage 5 End Stage CKD (GFR <15 mL/min/1.73 square meters)  Note: GFR calculation is accurate only with a steady state creatinine    CBC and differential [[id number]]  (Normal) Collected: 04/08/24 2155    Lab Status: Final result Specimen: Blood from Arm, Left Updated: 04/08/24 2205     WBC 7.56 Thousand/uL      RBC 4.86 Million/uL      Hemoglobin 14.8 g/dL      Hematocrit 44.3 %      MCV 91 fL      MCH 30.5 pg      MCHC 33.4 g/dL      RDW 13.7 %      MPV 11.2 fL      Platelets 159 Thousands/uL     Narrative:      This is an appended report.  These results have been appended to a previously  verified report.                   CT chest abdomen pelvis w contrast   Final Result by Suha Hoyt MD (04/08 2348)      No evidence of acute abnormality in the chest, abdomen or pelvis.      Ectatic ascending thoracic aorta measuring up to 41 mm. Recommend follow-up chest CT in 1 year.      Cholelithiasis without evidence of acute cholecystitis.      Small exophytic lesion with intermediate density in the right kidney may represent a complex renal cyst. However, follow-up with nonemergent renal ultrasound is recommended for confirmation.      Prostatomegaly. There is mild trabeculated bladder wall thickening, likely related to chronic outlet obstruction.               Workstation performed: [id number]               Procedures  Procedures      ED Course  ED Course as of 04/09/24 0040   Mon Apr 08, 2024 2212 WBC: 7.56   2212 Procedure Note: EKG  Date/Time: 04/08/24 10:12 PM   Interpreted by: Grecia Pham  Indications / Diagnosis: sob  ECG reviewed by me, the ED Provider: yes   The EKG demonstrates:  Rate: 91  Rhythm: NSR  Intervals: 1st degree AV block  Axis: regular  QRS/Blocks: regular  ST Changes: No acute ST Changes, no STD/LAUREN.  No prior EKG available for comparison.      2234 Creatinine(!): 1.80  Increased from 1.40 three weeks ago   Tue Apr 09, 2024   0002 Patient feels better, breathing has improved. Patient and son comfortable with patients respiratory status and with taking patient home at this time. States that back pain has improved. Waiting on 2 hour delta troponin.    0039 Delta 2hr hsTnI: 0                                       Medical Decision Making  Amount and/or Complexity of Data Reviewed  Labs: ordered. Decision-making details documented in ED Course.  Radiology: ordered.    Risk  Prescription drug management.    Patient is a 79 y.o. male with PMH of CAD, DM who presents to the ED with back pain, fever.    Vital signs afebrile currently. On exam right CVA tenderness, SOB upon  "standing.    History and physical exam most consistent with musculoskeletal back pain / viral illness. However, differential diagnosis included but not limited to urinary tract infection, acute intra-abdominal pathology / infection, ACS, pneumonia.     Plan: CBC, CMP, EKG, UA, CT chest abdomen pelvis    View ED course above for further discussion on patient workup.     On review of previous records aortic ultrasound 7/15/19 - no evidence of abdominal aortic aneurysm. Echocardiogram 10/4/18 - LVEF 62%, grade 3 diastolic dysfunction.    All labs reviewed and utilized in the medical decision making process  All radiology studies independently viewed by me and interpreted by the radiologist.  I reviewed all testing with the patient.     Upon re-evaluation patient resting comfortably, breathing improved.    Disposition: I have reviewed the patient's vital signs, nursing notes, and other relevant tests/information. I had a detailed discussion with the patient regarding the history, exam findings, and any diagnostic results.   Plan to discharge home in stable condition, follow up with primary care provider.  Discussed with patient who is agreeable to plan.  I discussed discharge instructions, need for follow-up, and oral return precautions for what to return for in addition to the written return precautions and discharge instructions, specifically highlighting areas of special concern.  The patient verbalized understanding of the discharge instructions and warnings that would necessitate return to the Emergency Department including worsening pain.  All questions the patient had were answered prior to discharge to the best of my ability.       Portions of the record may have been created with voice recognition software. Occasional wrong word or \"sound a like\" substitutions may have occurred due to the inherent limitations of voice recognition software. Read the chart carefully and recognize, using context, where " substitutions have occurred.        Disposition  Final diagnoses:   Back pain   Abnormal CT of the abdomen   Cholelithiasis     Time reflects when diagnosis was documented in both MDM as applicable and the Disposition within this note       Time User Action Codes Description Comment    4/8/2024 11:54 PM Grecia Pham [M54.9] Back pain     4/8/2024 11:54 PM Grecia Pham [R93.5] Abnormal CT of the abdomen     4/8/2024 11:55 PM Grecia Pham [K80.20] Cholelithiasis           ED Disposition       ED Disposition   Discharge    Condition   Stable    Date/Time   Mon Apr 8, 2024 11:54 PM    Comment   Neel Verma discharge to home/self care.                   Follow-up Information       Follow up With Specialties Details Why Contact Info    Miles Ruiz MD Family Medicine Schedule an appointment as soon as possible for a visit in 1 week  [address] [place] [id number]  [phone]              Patient's Medications   Discharge Prescriptions    No medications on file         PDMP Review       None             ED Provider  Attending physically available and evaluated Neel Verma. I managed the patient along with the ED Attending.    Electronically Signed by           Grecia Pham DO  04/09/24 0040

## 2024-04-09 NOTE — ED ATTENDING ATTESTATION
4/8/2024  IDestiny DO, saw and evaluated the patient. I have discussed the patient with the resident/non-physician practitioner and agree with the resident's/non-physician practitioner's findings, Plan of Care, and MDM as documented in the resident's/non-physician practitioner's note, except where noted. All available labs and Radiology studies were reviewed.  I was present for key portions of any procedure(s) performed by the resident/non-physician practitioner and I was immediately available to provide assistance.    Patient is a 79-year-old male here with family who helps interpret.  Patient has extensive past medical history of coronary artery disease, hypertension, hyperlipidemia, diabetes, MI in the past, thrombocytopenia coming in today with back pain and fever.  Initially started prior to lifting heavy.  It is worse with walking.  My exam he states he feels better but does wish to go home.  Discussed with them regarding waiting for CT results.  On my exam patient has no chest pain, shortness of breath or palpitations.      On my examination of patient:   /71 (BP Location: Right arm)   Pulse 99   Temp 98.6 °F (37 °C) (Oral)   Resp 16   Wt 90.9 kg (200 lb 6.4 oz)   SpO2 95%   BMI 35.50 kg/m²   The patient is awake, alert and oriented.    Well-nourished, well-developed. Appears stated age.   Speaking normally in full sentences.  No conversational dyspnea  Head: Normocephalic, atraumatic, nontender.    External examination of the ears is unremarkable  Pupils are equal round and reactive to light, there is no conjunctival injection or scleral icterus noted  Nares are patent without rhinorrhea.  The oropharynx is moist without injection.  No malocclusion. No stridor. Normal phonation. No drooling. Normal swallowing.  No brawniness under the tongue  Neck: Symmetric, trachea midline. No JVD.  Supple  Lungs: Patient with tachypnea but denies any shortness of breath no retractions, CTAB, lungs  "sounds equal bilateral.   Heart: Regular without murmurs rubs or gallops  Abdomen: Soft and nontender. There is no rebound or guarding  Musculoskeletal: Normal range of motion with grossly normal strength  Neuro: Cranial nerves II through XII grossly intact. Nonfocal exam  Skin: No rash noted, well perfused to the exposed areas  Psych: Mood and affect are appropriate    11:27 PM  Patient's labs were reviewed    Cr icreased from 1.4-1.8 and IV fluids ordered.  No leukocytosis but noted neutrophils but no bands.  No electrolyte abnormalities.  Troponin 5,. Wll complete delta troponin and reassess.         Disclosure: Voice to text software was used in the preparation of this document and could have resulted in translational errors.      Occasional wrong word or \"sound a like\" substitutions may have occurred due to the inherent limitations of voice recognition software.  Read the chart carefully and recognize, using context, where substitutions have occurred.       I have independently reviewed external records are available to me to the level of detail possible within the time constraints of my patient care responsibilities in the ED.      "

## 2024-04-09 NOTE — DISCHARGE INSTRUCTIONS
"You were seen in the emergency department today for back pain.    Your CT showed:   \"No evidence of acute abnormality in the chest, abdomen or pelvis.  Ectatic ascending thoracic aorta measuring up to 41 mm. Recommend follow-up chest CT in 1 year.  Cholelithiasis without evidence of acute cholecystitis.  Small exophytic lesion with intermediate density in the right kidney may represent a complex renal cyst. However, follow-up with nonemergent renal ultrasound is recommended for confirmation.  Prostatomegaly. There is mild trabeculated bladder wall thickening, likely related to chronic outlet obstruction.\".    Follow up with your primary care provider.   Follow up with comprehensive spine regarding your back pain.   Follow up for repeat CT in 1 year.     Your creatinine was also elevated today.     Take tylenol as needed for back pain.     Return to the emergency department for any new or concerning symptoms including worsening pain, difficulty breathing.     Thank you for choosing [facility] for your care today.   "

## 2024-04-10 ENCOUNTER — TELEPHONE (OUTPATIENT)
Dept: PHYSICAL THERAPY | Facility: OTHER | Age: 79
End: 2024-04-10

## 2024-04-10 NOTE — TELEPHONE ENCOUNTER
"The following encounter was completed with the [employer] assistance: ID# [id number].  Nurse reached out to discuss his recent referral entered for  [facility] program.    Patient declined to participate in the program at this time. He would like to attend the appointments he has scheduled and have labs drawn first. Nurse respected and understood as he was told he may have \"some issues related to his kidneys\". Patient stated he would call back if necessary and thanked this RN for calling with [employer] to discuss.   Nurse confirmed patient has [facility] contact information and encouraged to call program back if needed in the future. Patient agreed and very appreciative of call and discussion.    Nurse wished him well and referral closed per protocol.   "

## 2024-04-12 ENCOUNTER — OFFICE VISIT (OUTPATIENT)
Dept: FAMILY MEDICINE CLINIC | Facility: CLINIC | Age: 79
End: 2024-04-12
Payer: COMMERCIAL

## 2024-04-12 VITALS
RESPIRATION RATE: 16 BRPM | HEIGHT: 63 IN | HEART RATE: 72 BPM | DIASTOLIC BLOOD PRESSURE: 80 MMHG | TEMPERATURE: 97.9 F | WEIGHT: 200 LBS | BODY MASS INDEX: 35.44 KG/M2 | OXYGEN SATURATION: 98 % | SYSTOLIC BLOOD PRESSURE: 138 MMHG

## 2024-04-12 DIAGNOSIS — N28.9 KIDNEY LESION, NATIVE, RIGHT: ICD-10-CM

## 2024-04-12 DIAGNOSIS — E11.9 TYPE 2 DIABETES MELLITUS WITHOUT COMPLICATION, WITHOUT LONG-TERM CURRENT USE OF INSULIN (HCC): Primary | ICD-10-CM

## 2024-04-12 DIAGNOSIS — F51.01 PRIMARY INSOMNIA: ICD-10-CM

## 2024-04-12 DIAGNOSIS — N17.8 ACUTE RENAL FAILURE WITH OTHER SPECIFIED PATHOLOGICAL LESION IN KIDNEY (HCC): ICD-10-CM

## 2024-04-12 PROCEDURE — 99214 OFFICE O/P EST MOD 30 MIN: CPT | Performed by: FAMILY MEDICINE

## 2024-04-12 PROCEDURE — G2211 COMPLEX E/M VISIT ADD ON: HCPCS | Performed by: FAMILY MEDICINE

## 2024-04-12 RX ORDER — TRAZODONE HYDROCHLORIDE 50 MG/1
50 TABLET ORAL
Qty: 30 TABLET | Refills: 5 | Status: SHIPPED | OUTPATIENT
Start: 2024-04-12

## 2024-04-12 NOTE — PROGRESS NOTES
"Name: Neel Verma      : 1945      MRN: [id number]  Encounter Provider: Miles Ruiz MD  Encounter Date: 2024   Encounter department: [facility]   Post ER visit.  The patient reports general well-being and denies any current lumbar pain. However, they have experienced increased thirst, particularly at night, leading to dry mouth and increased water intake. They recall an ER visit where a kidney complication was mentioned, possibly a lesion. The patient also mentions a history of COVID-19, diabetes, and back pain, predominantly on the right side. They have been experiencing some urinary retention and discomfort during urination, which has slightly improved with medication adjustments. The patient is currently taking Tamsulosin 0.8 mg and has been prescribed Trazodone 50 mg for sleep issues.    Vital Signs  /80 (BP Location: Left arm, Patient Position: Sitting, Cuff Size: Standard)   Pulse 72   Temp 97.9 °F (36.6 °C) (Tympanic)   Resp 16   Ht 5' 3\" (1.6 m)   Wt 90.7 kg (200 lb)   SpO2 98%   BMI 35.43 kg/m²       Physical Exam  He looks in non distress; oriented x 3 Person, Place, and Day, HEENT unremarkable.  Lungs are clear. Heart is having Normal rhythm w/o murmurs.  Abdomen is soft, Severely obese.   MS: Muscle strength and tone were normal.  Neurological system has no deficit and walks normal.      Lab Results  Recent ER visit indicated elevated creatinine levels and decreased GFR, suggesting impaired renal function. Calcium levels were noted to be low. Repeat BMP to be ordered today to reassess renal function and calcium levels.    Imaging and other Relevant Results  Previous imaging showed no acute abnormalities in the chest, abdomen, or pelvis. A dilated thoracic aorta measuring 41 mm was noted, with follow-up recommended in one year. Gallstones were present without evidence of cholecystitis. An exophytic lesion on the right " kidney was identified, suggestive of a complex cyst or potentially neoplastic process, warranting further evaluation with ultrasound.    Assessment and Plan  1. Type 2 diabetes mellitus without complication, without long-term current use of insulin (HCC)  -     IRIS Diabetic eye exam    2. Kidney lesion, native, right  -     US kidney and bladder; Future; Expected date: 04/12/2024    3. Acute renal failure with other specified pathological lesion in kidney (HCC)  -     Basic metabolic panel; Future    4. Primary insomnia  -     traZODone (DESYREL) 50 mg tablet; Take 1 tablet (50 mg total) by mouth daily at bedtime        1. Kidney Lesion: The exophytic lesion on the right kidney requires further characterization. An ultrasound will be ordered to differentiate between a complex cyst and a neoplastic process. Given the patient's history and symptoms, close monitoring is warranted.  2. Acute renal failure with other specified pathological lesion in kidney (HCC) -Decreased GFR: The patient's decreased GFR is concerning for chronic kidney disease, possibly exacerbated by their history of diabetes. We will monitor renal function closely with repeat BMP today, and consider referral to nephrology if there is no improvement.    In office bladder scan showed minimal residual 25 cc postvoid.  3. Gallstones: Asymptomatic gallstones noted; no intervention required at this time. Will educate the patient on symptoms of cholecystitis and indications for seeking medical attention.  4. Thoracic Aorta Dilation: Will schedule follow-up imaging in one year to monitor the size of the thoracic aorta.  5. Urinary Symptoms: The patient reports some improvement with Tamsulosin 0.8 mg. We will continue the current dose and reassess symptoms at the next visit. A bladder ultrasound will be performed to evaluate for post-void residual volume and rule out significant urinary retention.  6. Sleep Disturbances: Prescribed Trazodone 50 mg to be  taken at bedtime to address the patient's sleep issues.  7. Hypocalcemia: Low calcium levels noted; will repeat serum calcium with today's labs and address any abnormalities accordingly.  8. Follow-up Appointments: The patient has upcoming appointments with urology and other specialists. Will ensure coordination of care and review of findings from these visits.      Miles Ruiz MD   [facility] PRIMARY CARE [facility]

## 2024-04-13 ENCOUNTER — HOSPITAL ENCOUNTER (OUTPATIENT)
Dept: ULTRASOUND IMAGING | Facility: HOSPITAL | Age: 79
Discharge: HOME | End: 2024-04-13
Payer: COMMERCIAL

## 2024-04-13 ENCOUNTER — LAB (OUTPATIENT)
Dept: LAB | Facility: HOSPITAL | Age: 79
End: 2024-04-13
Payer: COMMERCIAL

## 2024-04-13 DIAGNOSIS — N17.8 ACUTE RENAL FAILURE WITH OTHER SPECIFIED PATHOLOGICAL LESION IN KIDNEY (HCC): ICD-10-CM

## 2024-04-13 DIAGNOSIS — N28.9 KIDNEY LESION, NATIVE, RIGHT: ICD-10-CM

## 2024-04-13 DIAGNOSIS — E83.51 HYPOCALCEMIA: Primary | ICD-10-CM

## 2024-04-13 LAB
ANION GAP SERPL CALCULATED.3IONS-SCNC: 7 MMOL/L (ref 4–13)
BUN SERPL-MCNC: 16 MG/DL (ref 5–25)
CALCIUM SERPL-MCNC: 8.2 MG/DL (ref 8.4–10.2)
CHLORIDE SERPL-SCNC: 107 MMOL/L (ref 96–108)
CO2 SERPL-SCNC: 26 MMOL/L (ref 21–32)
CREAT SERPL-MCNC: 1.38 MG/DL (ref 0.6–1.3)
GFR SERPL CREATININE-BSD FRML MDRD: 48 ML/MIN/1.73SQ M
GLUCOSE P FAST SERPL-MCNC: 85 MG/DL (ref 65–99)
POTASSIUM SERPL-SCNC: 4 MMOL/L (ref 3.5–5.3)
SODIUM SERPL-SCNC: 140 MMOL/L (ref 135–147)

## 2024-04-13 PROCEDURE — 80048 BASIC METABOLIC PNL TOTAL CA: CPT

## 2024-04-13 PROCEDURE — 76775 US EXAM ABDO BACK WALL LIM: CPT

## 2024-04-13 PROCEDURE — 36415 COLL VENOUS BLD VENIPUNCTURE: CPT

## 2024-04-13 NOTE — RESULT ENCOUNTER NOTE
Dear Neel, Your renal function is back to baseline. However, your calcium level is low again. Please go to the lab for an additional test related to calcium metabolism.

## 2024-04-14 LAB
LEFT EYE DIABETIC RETINOPATHY: ABNORMAL
LEFT EYE IMAGE QUALITY: ABNORMAL
LEFT EYE MACULAR EDEMA: ABNORMAL
LEFT EYE OTHER RETINOPATHY: ABNORMAL
RIGHT EYE DIABETIC RETINOPATHY: ABNORMAL
RIGHT EYE IMAGE QUALITY: ABNORMAL
RIGHT EYE MACULAR EDEMA: ABNORMAL
RIGHT EYE OTHER RETINOPATHY: ABNORMAL
SEVERITY (EYE EXAM): ABNORMAL

## 2024-04-17 ENCOUNTER — LAB (OUTPATIENT)
Dept: LAB | Facility: HOSPITAL | Age: 79
End: 2024-04-17
Payer: COMMERCIAL

## 2024-04-17 DIAGNOSIS — E83.51 HYPOCALCEMIA: ICD-10-CM

## 2024-04-17 LAB
CA-I BLD-SCNC: 1.14 MMOL/L (ref 1.12–1.32)
PHOSPHATE SERPL-MCNC: 4.2 MG/DL (ref 2.3–4.1)
PTH-INTACT SERPL-MCNC: 85.7 PG/ML (ref 12–88)

## 2024-04-17 PROCEDURE — 82330 ASSAY OF CALCIUM: CPT

## 2024-04-17 PROCEDURE — 83970 ASSAY OF PARATHORMONE: CPT

## 2024-04-17 PROCEDURE — 84100 ASSAY OF PHOSPHORUS: CPT

## 2024-04-17 PROCEDURE — 36415 COLL VENOUS BLD VENIPUNCTURE: CPT

## 2024-04-17 PROCEDURE — 82652 VIT D 1 25-DIHYDROXY: CPT

## 2024-04-18 LAB — 1,25(OH)2D3 SERPL-MCNC: 26.7 PG/ML (ref 24.8–81.5)

## 2024-04-19 DIAGNOSIS — E11.319 DIABETIC RETINOPATHY OF BOTH EYES WITHOUT MACULAR EDEMA ASSOCIATED WITH TYPE 2 DIABETES MELLITUS, UNSPECIFIED RETINOPATHY SEVERITY (HCC): Primary | ICD-10-CM

## 2024-04-30 RX ORDER — FINASTERIDE 5 MG/1
5 TABLET, FILM COATED ORAL DAILY
COMMUNITY
Start: 2024-04-24 | End: 2025-04-24

## 2024-05-01 ENCOUNTER — OFFICE VISIT (OUTPATIENT)
Dept: FAMILY MEDICINE CLINIC | Facility: CLINIC | Age: 79
End: 2024-05-01
Payer: COMMERCIAL

## 2024-05-01 VITALS
OXYGEN SATURATION: 98 % | WEIGHT: 195 LBS | DIASTOLIC BLOOD PRESSURE: 80 MMHG | TEMPERATURE: 97.9 F | HEART RATE: 68 BPM | RESPIRATION RATE: 16 BRPM | SYSTOLIC BLOOD PRESSURE: 130 MMHG | BODY MASS INDEX: 34.55 KG/M2 | HEIGHT: 63 IN

## 2024-05-01 DIAGNOSIS — I25.10 CORONARY ARTERY DISEASE INVOLVING NATIVE CORONARY ARTERY OF NATIVE HEART WITHOUT ANGINA PECTORIS: ICD-10-CM

## 2024-05-01 DIAGNOSIS — I10 ESSENTIAL HYPERTENSION: ICD-10-CM

## 2024-05-01 DIAGNOSIS — M54.50 CHRONIC BILATERAL LOW BACK PAIN WITHOUT SCIATICA: ICD-10-CM

## 2024-05-01 DIAGNOSIS — I10 PRIMARY HYPERTENSION: Primary | ICD-10-CM

## 2024-05-01 DIAGNOSIS — N18.31 STAGE 3A CHRONIC KIDNEY DISEASE (HCC): ICD-10-CM

## 2024-05-01 DIAGNOSIS — G89.29 CHRONIC BILATERAL LOW BACK PAIN WITHOUT SCIATICA: ICD-10-CM

## 2024-05-01 DIAGNOSIS — G47.01 INSOMNIA DUE TO MEDICAL CONDITION: ICD-10-CM

## 2024-05-01 DIAGNOSIS — E11.65 TYPE 2 DIABETES MELLITUS WITH HYPERGLYCEMIA, WITHOUT LONG-TERM CURRENT USE OF INSULIN (HCC): ICD-10-CM

## 2024-05-01 DIAGNOSIS — E78.00 PURE HYPERCHOLESTEROLEMIA: ICD-10-CM

## 2024-05-01 PROCEDURE — 99214 OFFICE O/P EST MOD 30 MIN: CPT | Performed by: FAMILY MEDICINE

## 2024-05-01 PROCEDURE — G2211 COMPLEX E/M VISIT ADD ON: HCPCS | Performed by: FAMILY MEDICINE

## 2024-05-01 RX ORDER — ASPIRIN 81 MG/1
81 TABLET, CHEWABLE ORAL EVERY 24 HOURS
Qty: 90 TABLET | Refills: 3 | Status: SHIPPED | OUTPATIENT
Start: 2024-05-01

## 2024-05-01 RX ORDER — CLOPIDOGREL BISULFATE 75 MG/1
75 TABLET ORAL EVERY 24 HOURS
Qty: 90 TABLET | Refills: 3 | Status: SHIPPED | OUTPATIENT
Start: 2024-05-01

## 2024-05-01 RX ORDER — EZETIMIBE 10 MG/1
10 TABLET ORAL DAILY
Qty: 90 TABLET | Refills: 3 | Status: SHIPPED | OUTPATIENT
Start: 2024-05-01

## 2024-05-01 RX ORDER — AMLODIPINE BESYLATE 2.5 MG/1
2.5 TABLET ORAL DAILY
Qty: 90 TABLET | Refills: 1 | Status: SHIPPED | OUTPATIENT
Start: 2024-05-01

## 2024-05-01 RX ORDER — METOPROLOL SUCCINATE 100 MG/1
100 TABLET, EXTENDED RELEASE ORAL DAILY
Qty: 90 TABLET | Refills: 3 | Status: SHIPPED | OUTPATIENT
Start: 2024-05-01

## 2024-05-01 RX ORDER — ATORVASTATIN CALCIUM 80 MG/1
80 TABLET, FILM COATED ORAL DAILY
Qty: 90 TABLET | Refills: 3 | Status: SHIPPED | OUTPATIENT
Start: 2024-05-01

## 2024-05-01 RX ORDER — OLMESARTAN MEDOXOMIL AND HYDROCHLOROTHIAZIDE 40/12.5 40; 12.5 MG/1; MG/1
1 TABLET ORAL DAILY
Qty: 90 TABLET | Refills: 3 | Status: SHIPPED | OUTPATIENT
Start: 2024-05-01

## 2024-05-01 NOTE — PROGRESS NOTES
Name: Neel Verma      : 1945      MRN: [id number]  Encounter Provider: Miles Ruiz MD  Encounter Date: 2024   Encounter department: [facility] PRIMARY CARE [facility]     Chief Complaint  Patient presents for follow-up on hypertension (HTN), diabetes mellitus (DM), insomnia, back pain, and chronic kidney disease (CKD).    History of Present Illness  The patient reports improved sleep onset but continues to awaken three to four times nightly, primarily to urinate. The new medication, Sinthroid 5 mg, has been added to the regimen. The patient has been adhering to a single dose of the previously doubled medication as advised. Back pain localized to the lumbar region persists, with no new exacerbating factors. The patient's renal function has shown improvement, and diabetes control requires re-evaluation in . The patient is compliant with insulin injections and reports a slight decrease in appetite and weight. There has been an increase in physical activity. The patient experienced a minor head injury without loss of consciousness or need for acute intervention. Urological symptoms have improved post-procedure, with stronger urinary stream and no nocturnal pain. The patient reports dry mouth, which may be medication-related, and denies any changes in voice or throat discomfort.    Medications  amLODIPine  aspirin  atorvastatin  clopidogrel  ezetimibe  finasteride  metFORMIN  metoprolol succinate  olmesartan-hydrochlorothiazide  Silodosin Caps  tirzepatide  traZODone      Allergies  No known drug allergies.    Past Medical History  Hypertension, diabetes mellitus, chronic kidney disease, insomnia, lumbar back pain.    Past Surgical History  Urological procedure (details not specified in the transcript).    Social History  Patient is physically active and engages in yard work.        Review of Systems  Positive for nocturia and dry mouth. Negative for dysphonia,  dysphagia, and significant neurological symptoms post-head injury.    Vital Signs  Vital signs were not provided in the transcript.    Physical Exam  Physical examination details were not provided in the transcript.    Lab Results  Improvement in renal function noted, diabetes control to be re-evaluated in June.    Imaging and other Relevant Results  Previous imaging indicated lumbar spine degenerative changes and stable renal cysts. No evidence of aneurysm on aortic scan.      Assessment and Plan     1. Primary hypertension  -     amLODIPine (NORVASC) 2.5 mg tablet; Take 1 tablet (2.5 mg total) by mouth daily [place] 1 tableta cada leonel antes de acostarse para la presion    2. Type 2 diabetes mellitus with hyperglycemia, without long-term current use of insulin ([facility])  -     metFORMIN (GLUCOPHAGE) 500 mg tablet; Take 1 tablet (500 mg total) by mouth 2 (two) times a day with meals .  -     tirzepatide (Mounjaro) 5 MG/0.5ML; Inject 0.5 mL (5 mg total) under the skin every 7 days    3. Essential hypertension  -     atorvastatin (LIPITOR) 80 mg tablet; Take 1 tablet (80 mg total) by mouth daily  -     olmesartan-hydrochlorothiazide (BENICAR HCT) 40-12.5 MG per tablet; Take 1 tablet by mouth daily  -     metoprolol succinate (TOPROL-XL) 100 mg 24 hr tablet; Take 1 tablet (100 mg total) by mouth daily    4. Chronic bilateral low back pain without sciatica    5. Coronary artery disease involving native coronary artery of native heart without angina pectoris  -     clopidogrel (PLAVIX) 75 mg tablet; Take 1 tablet (75 mg total) by mouth every 24 hours  -     aspirin 81 mg chewable tablet; Chew 1 tablet (81 mg total) every 24 hours    6. Pure hypercholesterolemia  -     ezetimibe (ZETIA) 10 mg tablet; Take 1 tablet (10 mg total) by mouth daily    7. Insomnia due to medical condition    8. Stage 3a chronic kidney disease (HCC)        Hypertension: Continue current antihypertensive regimen. Monitor blood pressure and adjust  medications as needed.  Diabetes Mellitus: Recheck glycemic control in June. Continue insulin injections and monitor for hypoglycemia.  Insomnia: Continue Sinthroid 5 mg and assess efficacy. Consider sleep hygiene practices and possible medication adjustments if sleep disturbances persist.  Back Pain: Advise on conservative management, including physical therapy and analgesics. Consider imaging if symptoms worsen or new symptoms arise.  Chronic Kidney Disease: Continue monitoring renal function. Encourage hydration and follow up on nephrology recommendations.  Urological Follow-up: Continue with urologist-prescribed medications and follow up in six months or as recommended.  Dry Mouth: Evaluate for potential medication side effects and manage accordingly. Encourage sipping water for symptomatic relief.  Head Injury: No acute intervention required. Advise caution to prevent falls and further injury.  Retinal Examination: Follow up with ophthalmologist for detailed retinal examination due lack of IRIS test quality.      Miles Ruiz MD   [facility] CARE [facility]

## 2024-05-02 ENCOUNTER — TELEPHONE (OUTPATIENT)
Age: 79
End: 2024-05-02

## 2024-05-02 NOTE — TELEPHONE ENCOUNTER
PA for Mounjaro  5mg Approved     Date(s) approved This medication or product was previously approved on [id number] from 2024-02-26 to 2024-12-31.     Case #    Patient advised by          [x] MyChart Message  [] Phone call   []LMOM  []L/M to call office as no active Communication consent on file  []Unable to leave detailed message as VM not approved on Communication consent       Pharmacy advised by    []Fax  []Phone call    Approval letter scanned into Media Yes

## 2024-05-03 DIAGNOSIS — E11.65 TYPE 2 DIABETES MELLITUS WITH HYPERGLYCEMIA, WITHOUT LONG-TERM CURRENT USE OF INSULIN (HCC): ICD-10-CM

## 2024-06-11 DIAGNOSIS — E11.65 TYPE 2 DIABETES MELLITUS WITH HYPERGLYCEMIA, WITHOUT LONG-TERM CURRENT USE OF INSULIN (HCC): ICD-10-CM

## 2024-06-11 RX ORDER — TIRZEPATIDE 5 MG/.5ML
INJECTION, SOLUTION SUBCUTANEOUS
Qty: 2 ML | Refills: 0 | Status: SHIPPED | OUTPATIENT
Start: 2024-06-11

## 2024-06-19 ENCOUNTER — OFFICE VISIT (OUTPATIENT)
Dept: FAMILY MEDICINE CLINIC | Facility: CLINIC | Age: 79
End: 2024-06-19
Payer: COMMERCIAL

## 2024-06-19 ENCOUNTER — LAB (OUTPATIENT)
Dept: LAB | Facility: HOSPITAL | Age: 79
End: 2024-06-19
Payer: COMMERCIAL

## 2024-06-19 VITALS
OXYGEN SATURATION: 98 % | SYSTOLIC BLOOD PRESSURE: 140 MMHG | HEIGHT: 63 IN | DIASTOLIC BLOOD PRESSURE: 80 MMHG | WEIGHT: 195 LBS | TEMPERATURE: 98 F | HEART RATE: 48 BPM | RESPIRATION RATE: 16 BRPM | BODY MASS INDEX: 34.55 KG/M2

## 2024-06-19 DIAGNOSIS — I10 ESSENTIAL HYPERTENSION: ICD-10-CM

## 2024-06-19 DIAGNOSIS — E11.65 TYPE 2 DIABETES MELLITUS WITH HYPERGLYCEMIA, WITHOUT LONG-TERM CURRENT USE OF INSULIN (HCC): ICD-10-CM

## 2024-06-19 DIAGNOSIS — M25.562 ACUTE PAIN OF LEFT KNEE: Primary | ICD-10-CM

## 2024-06-19 DIAGNOSIS — R00.1 SINUS BRADYCARDIA: ICD-10-CM

## 2024-06-19 LAB
ALBUMIN SERPL BCG-MCNC: 4.1 G/DL (ref 3.5–5)
ALP SERPL-CCNC: 71 U/L (ref 34–104)
ALT SERPL W P-5'-P-CCNC: 52 U/L (ref 7–52)
ANION GAP SERPL CALCULATED.3IONS-SCNC: 7 MMOL/L (ref 4–13)
AST SERPL W P-5'-P-CCNC: 30 U/L (ref 13–39)
BILIRUB SERPL-MCNC: 0.59 MG/DL (ref 0.2–1)
BUN SERPL-MCNC: 20 MG/DL (ref 5–25)
CALCIUM SERPL-MCNC: 9.4 MG/DL (ref 8.4–10.2)
CHLORIDE SERPL-SCNC: 108 MMOL/L (ref 96–108)
CO2 SERPL-SCNC: 27 MMOL/L (ref 21–32)
CREAT SERPL-MCNC: 1.35 MG/DL (ref 0.6–1.3)
EST. AVERAGE GLUCOSE BLD GHB EST-MCNC: 126 MG/DL
GFR SERPL CREATININE-BSD FRML MDRD: 49 ML/MIN/1.73SQ M
GLUCOSE P FAST SERPL-MCNC: 99 MG/DL (ref 65–99)
HBA1C MFR BLD: 6 %
POTASSIUM SERPL-SCNC: 4.6 MMOL/L (ref 3.5–5.3)
PROT SERPL-MCNC: 6.6 G/DL (ref 6.4–8.4)
SODIUM SERPL-SCNC: 142 MMOL/L (ref 135–147)

## 2024-06-19 PROCEDURE — 80053 COMPREHEN METABOLIC PANEL: CPT

## 2024-06-19 PROCEDURE — 99215 OFFICE O/P EST HI 40 MIN: CPT | Performed by: FAMILY MEDICINE

## 2024-06-19 PROCEDURE — 36415 COLL VENOUS BLD VENIPUNCTURE: CPT

## 2024-06-19 PROCEDURE — 83036 HEMOGLOBIN GLYCOSYLATED A1C: CPT

## 2024-06-19 PROCEDURE — 20610 DRAIN/INJ JOINT/BURSA W/O US: CPT | Performed by: FAMILY MEDICINE

## 2024-06-19 RX ORDER — TRIAMCINOLONE ACETONIDE 40 MG/ML
40 INJECTION, SUSPENSION INTRA-ARTICULAR; INTRAMUSCULAR
Status: COMPLETED | OUTPATIENT
Start: 2024-06-19 | End: 2024-06-19

## 2024-06-19 RX ORDER — SPIRONOLACTONE 25 MG/1
25 TABLET ORAL DAILY
Qty: 30 TABLET | Refills: 5 | Status: SHIPPED | OUTPATIENT
Start: 2024-06-19

## 2024-06-19 RX ORDER — TAMSULOSIN HYDROCHLORIDE 0.4 MG/1
0.4 CAPSULE ORAL DAILY
COMMUNITY
Start: 2024-05-17

## 2024-06-19 RX ORDER — METOPROLOL SUCCINATE 25 MG/1
25 TABLET, EXTENDED RELEASE ORAL DAILY
Qty: 90 TABLET | Refills: 3 | Status: SHIPPED | OUTPATIENT
Start: 2024-06-19 | End: 2025-06-14

## 2024-06-19 RX ADMIN — TRIAMCINOLONE ACETONIDE 40 MG: 40 INJECTION, SUSPENSION INTRA-ARTICULAR; INTRAMUSCULAR at 09:20

## 2024-06-19 NOTE — RESULT ENCOUNTER NOTE
I spoke with patient about results of today's labs.  Kidney function is stable.  Hemoglobin A1c improved from 6.8% down to 6.0%.

## 2024-06-19 NOTE — PROGRESS NOTES
Name: Neel Verma      : 1945      MRN: [id number]  Encounter Provider: Miles Riuz MD  Encounter Date: 2024   Encounter department: [facility] CARE [facility]   Follow-Up for Left Knee Pain and Bradycardia    Chief Complaint:  Left knee pain and bradycardia.    History of Present Illness:  The patient, Ammy, presents with left knee pain that began approximately two days ago. He reports no prior imaging studies for the knee. The knee is also noted to be swollen. Additionally, the patient has a history of hypertension and is currently experiencing bradycardia, with a pulse rate around 45 bpm. The patient is on metoprolol 100 mg XR, which is suspected to be contributing to the bradycardia. He also reports difficulties with his new diabetes management device due to issues with the needle.    Medications:    Current Outpatient Medications:     metoprolol succinate (TOPROL-XL) 100mg 24 hr tablet, Take 1 tablet (25 mg total) by mouth daily, Disp: 90 tablet, Rfl: 3      tamsulosin (FLOMAX) 0.4 mg, Take 0.4 mg by mouth daily, Disp: , Rfl:     Alcohol Swabs (Comfort Touch Alcohol Prep) 70 % PADS, TEST 3-4 TIMES A DAY AS DIRECTED, Disp: 100 each, Rfl: 5    aspirin 81 mg chewable tablet, Chew 1 tablet (81 mg total) every 24 hours, Disp: 90 tablet, Rfl: 3    atorvastatin (LIPITOR) 80 mg tablet, Take 1 tablet (80 mg total) by mouth daily, Disp: 90 tablet, Rfl: 3    Blood Glucose Calibration (OT ULTRA/FASTTK CNTRL SOLN) SOLN, Use as directed, Disp: , Rfl:     clopidogrel (PLAVIX) 75 mg tablet, Take 1 tablet (75 mg total) by mouth every 24 hours, Disp: 90 tablet, Rfl: 3    Comfort EZ Pen Needles 32G X 4 MM MISC, TEST 3-4 TIMES A DAY AS DIRECTED, Disp: , Rfl:     Comfort Touch Plus Lancets 30G MISC, TEST 3-4 TIMES A DAY AS DIRECTED, Disp: , Rfl:     ezetimibe (ZETIA) 10 mg tablet, Take 1 tablet (10 mg total) by mouth daily, Disp: 90 tablet, Rfl: 3    finasteride (PROSCAR) 5  "mg tablet, Take 5 mg by mouth daily, Disp: , Rfl:     metFORMIN (GLUCOPHAGE) 500 mg tablet, Take 1 tablet (500 mg total) by mouth 2 (two) times a day with meals ., Disp: 180 tablet, Rfl: 3    Mounjaro 5 MG/0.5ML, INJECT 0.5 ML (5 MG TOTAL) UNDER THE SKIN EVERY 7 DAYS, Disp: 2 mL, Rfl: 0    olmesartan-hydrochlorothiazide (BENICAR HCT) 40-12.5 MG per tablet, Take 1 tablet by mouth daily, Disp: 90 tablet, Rfl: 3    OneTouch Ultra test strip, TEST 3-4 TIMES A DAY AS DIRECTED, Disp: 100 each, Rfl: 3    Silodosin 8 MG CAPS, Take 8 mg by mouth daily, Disp: , Rfl:     traZODone (DESYREL) 50 mg tablet, Take 1 tablet (50 mg total) by mouth daily at bedtime, Disp: 30 tablet, Rfl: 5      Allergies:  No known drug allergies.    Patient Active Problem List   Diagnosis    CAD (coronary artery disease)    Diverticulosis    Hypertension    Hyperlipidemia    Old myocardial infarction    S/P CABG x 3  2016    Type 2 diabetes mellitus without complication, without long-term current use of insulin ([facility])    CKD stage G3a/A2, GFR 45-59 and albumin creatinine ratio  mg/g ([facility])    Class 2 severe obesity due to excess calories with serious comorbidity and body mass index (BMI) of 35.0 to 35.9 in adult ([facility])    BPH associated with nocturia    Mixed conductive and sensorineural hearing loss of both ears    Elevated PSA             Review of Systems:  Left knee pain and swelling  Bradycardia  Issues with diabetes management device    Vital Signs:  /80 (BP Location: Left arm, Patient Position: Sitting, Cuff Size: Standard)   Pulse (!) 48   Temp 98 °F (36.7 °C) (Tympanic)   Resp 16   Ht 5' 3\" (1.6 m)   Wt 88.5 kg (195 lb)   SpO2 98%   BMI 34.54 kg/m²       Physical Exam:  Hear Bradycardia 45 by me.  Knee, left with tenderness and mild effusion. Limping.    Lab Results:  BMP to be checked.    Imaging and Other Relevant Results:  No prior imaging for the left knee.    Assessment and Plan:  1. Left knee pain: Injected knee with " Kenalog 40 mg and lidocaine.  2. Bradycardia: Decreasing metoprolol from 100 mg XR to 25 mg XR and starting spironolactone. BMP to be checked.  3. Hypertension: Adjusting medication regimen to manage blood pressure without exacerbating bradycardia. Adding spironolactone.    spironolactone (ALDACTONE) 25 mg tablet, Take 1 tablet (25 mg total) by mouth daily, Disp: 30 tablet, Rfl: 5        Large joint arthrocentesis: L knee  Universal Protocol:  Consent: Verbal consent obtained. Written consent not obtained.  Consent given by: patient  Timeout called at: 6/19/2024 8:30 AM.  Patient understanding: patient states understanding of the procedure being performed  Patient consent: the patient's understanding of the procedure matches consent given  Patient identity confirmed: verbally with patient  Supporting Documentation  Indications: pain   Procedure Details  Location: knee - L knee  Needle size: 25 G  Ultrasound guidance: no  Approach: anterior  Medications administered: 40 mg triamcinolone acetonide 40 mg/mL    Patient tolerance: patient tolerated the procedure well with no immediate complications  Dressing:  Sterile dressing applied          Miles Ruiz MD   [facility]

## 2024-07-06 DIAGNOSIS — E11.65 TYPE 2 DIABETES MELLITUS WITH HYPERGLYCEMIA, WITHOUT LONG-TERM CURRENT USE OF INSULIN (HCC): ICD-10-CM

## 2024-07-07 RX ORDER — BLOOD SUGAR DIAGNOSTIC
STRIP MISCELLANEOUS
Qty: 100 EACH | Refills: 1 | Status: SHIPPED | OUTPATIENT
Start: 2024-07-07

## 2024-07-25 DIAGNOSIS — E11.65 TYPE 2 DIABETES MELLITUS WITH HYPERGLYCEMIA, WITHOUT LONG-TERM CURRENT USE OF INSULIN (HCC): ICD-10-CM

## 2024-07-25 RX ORDER — BLOOD SUGAR DIAGNOSTIC
STRIP MISCELLANEOUS
Qty: 100 EACH | Refills: 1 | Status: SHIPPED | OUTPATIENT
Start: 2024-07-25

## 2024-08-01 ENCOUNTER — OFFICE VISIT (OUTPATIENT)
Dept: FAMILY MEDICINE CLINIC | Facility: CLINIC | Age: 79
End: 2024-08-01
Payer: COMMERCIAL

## 2024-08-01 VITALS
HEIGHT: 63 IN | TEMPERATURE: 98.2 F | RESPIRATION RATE: 18 BRPM | HEART RATE: 70 BPM | DIASTOLIC BLOOD PRESSURE: 86 MMHG | SYSTOLIC BLOOD PRESSURE: 150 MMHG | BODY MASS INDEX: 33.77 KG/M2 | WEIGHT: 190.6 LBS | OXYGEN SATURATION: 97 %

## 2024-08-01 DIAGNOSIS — Z29.11 NEED FOR RSV IMMUNIZATION: ICD-10-CM

## 2024-08-01 DIAGNOSIS — H61.22 IMPACTED CERUMEN OF LEFT EAR: ICD-10-CM

## 2024-08-01 DIAGNOSIS — I10 PRIMARY HYPERTENSION: ICD-10-CM

## 2024-08-01 DIAGNOSIS — E11.65 TYPE 2 DIABETES MELLITUS WITH HYPERGLYCEMIA, WITHOUT LONG-TERM CURRENT USE OF INSULIN (HCC): Primary | ICD-10-CM

## 2024-08-01 DIAGNOSIS — F51.01 PRIMARY INSOMNIA: ICD-10-CM

## 2024-08-01 DIAGNOSIS — H61.22 LEFT EAR IMPACTED CERUMEN: ICD-10-CM

## 2024-08-01 PROCEDURE — G2211 COMPLEX E/M VISIT ADD ON: HCPCS | Performed by: FAMILY MEDICINE

## 2024-08-01 PROCEDURE — 99214 OFFICE O/P EST MOD 30 MIN: CPT | Performed by: FAMILY MEDICINE

## 2024-08-01 NOTE — PROGRESS NOTES
"Name: Neel Verma      : 1945      MRN: [id number]  Encounter Provider: Miles Ruiz MD  Encounter Date: 2024   Encounter department: [facility] PRIMARY CARE [facility]    Subjective     Chief Complaint   Patient presents with    Sleeping Problem    Diabetes    Hypertension     Lab Results   Component Value Date    HGBA1C 6.0 (H) 2024     Follow-Up for Diabetes, Hypertension, and Insomnia    Subjective:  Neel Verma, a 79-year-old male, presents for a follow-up visit. He has a history of diabetes mellitus type 2 and hypertension. His last HbA1c was 6.0% in 2024. The patient reports that his blood pressure readings at home have been mildly elevated. He is experiencing sleeping problems, going to bed between 10 PM and 12 AM after watching the news, and waking up around 2 or 3 AM without being able to return to sleep. Additionally, he reports left ear problems due to chronic hearing loss and was recently evaluated by an audiologist who diagnosed left ear cerumen impaction. He is scheduled to return in one week for ear lavage. The patient also mentions that he will have an appointment with a radiologist in September to assess the need for hearing aids in both ears.    Vital Signs:  /86 (BP Location: Left arm, Patient Position: Sitting, Cuff Size: Standard)   Pulse 70   Temp 98.2 °F (36.8 °C) (Tympanic)   Resp 18   Ht 5' 3\" (1.6 m)   Wt 86.5 kg (190 lb 9.6 oz)   SpO2 97%   BMI 33.76 kg/m²       Physical Exam:  General: Alert and oriented, no acute distress.  HEENT: Left ear cerumen impaction noted.  Cardiovascular: Regular rate and rhythm, no murmurs.  Respiratory: Clear to auscultation bilaterally.  Abdomen: Soft, non-tender, no organomegaly.  Extremities: No edema.    Lab Results:  HbA1c: 6.0% (2024)    Imaging and Other Relevant Results:  Audiology: Left ear cerumen impaction diagnosed.    Assessment and Plan:  1. Diabetes Mellitus Type 2: Continue " metformin 500 mg twice daily. Reassess HbA1c in the next follow-up visit.  2. Hypertension: Continue current medications: olmesartan 40 mg, hydrochlorothiazide 12.5 mg, and metoprolol 50 mg, all once daily. Reassess blood pressure in one week.  3. Insomnia: Advise patient on sleep hygiene practices, including going to bed at the same time every night, keeping the room temperature between 60-67 degrees Fahrenheit, wearing loose clothing, and avoiding TV and phone use at least 1-2 hours before bedtime.  4. Left Ear Cerumen Impaction: Instruct patient to use Debrox drops twice daily and return in one week for ear lavage.  5. Hearing Loss: Patient has an appointment with a radiologist in September to assess the need for hearing aids in both ears.  6. Preventive Care: Recommend patient receive the RSV vaccine at a local pharmacy.        Miles Ruiz MD   [facility] PRIMARY CARE [facility]

## 2024-08-06 DIAGNOSIS — E11.65 TYPE 2 DIABETES MELLITUS WITH HYPERGLYCEMIA, WITHOUT LONG-TERM CURRENT USE OF INSULIN (HCC): ICD-10-CM

## 2024-08-07 RX ORDER — TIRZEPATIDE 5 MG/.5ML
INJECTION, SOLUTION SUBCUTANEOUS
Qty: 2 ML | Refills: 0 | Status: SHIPPED | OUTPATIENT
Start: 2024-08-07

## 2024-08-08 ENCOUNTER — OFFICE VISIT (OUTPATIENT)
Dept: FAMILY MEDICINE CLINIC | Facility: CLINIC | Age: 79
End: 2024-08-08
Payer: COMMERCIAL

## 2024-08-08 VITALS — DIASTOLIC BLOOD PRESSURE: 84 MMHG | TEMPERATURE: 98 F | HEART RATE: 72 BPM | SYSTOLIC BLOOD PRESSURE: 140 MMHG

## 2024-08-08 DIAGNOSIS — H61.23 HEARING LOSS OF BOTH EARS DUE TO CERUMEN IMPACTION: Primary | ICD-10-CM

## 2024-08-08 PROCEDURE — 99213 OFFICE O/P EST LOW 20 MIN: CPT | Performed by: FAMILY MEDICINE

## 2024-08-08 PROCEDURE — 69210 REMOVE IMPACTED EAR WAX UNI: CPT | Performed by: FAMILY MEDICINE

## 2024-08-12 NOTE — PROGRESS NOTES
Chief Complaint   Patient presents with    Cerumen Impaction       Allergies   Allergen Reactions    No Active Allergies          Current Outpatient Medications:     Alcohol Swabs (Comfort Touch Alcohol Prep) 70 % PADS, TEST 3-4 TIMES A DAY AS DIRECTED, Disp: 100 each, Rfl: 5    aspirin 81 mg chewable tablet, Chew 1 tablet (81 mg total) every 24 hours, Disp: 90 tablet, Rfl: 3    atorvastatin (LIPITOR) 80 mg tablet, Take 1 tablet (80 mg total) by mouth daily, Disp: 90 tablet, Rfl: 3    Blood Glucose Calibration (OT ULTRA/FASTTK CNTRL SOLN) SOLN, Use as directed, Disp: , Rfl:     clopidogrel (PLAVIX) 75 mg tablet, Take 1 tablet (75 mg total) by mouth every 24 hours, Disp: 90 tablet, Rfl: 3    Comfort EZ Pen Needles 32G X 4 MM MISC, TEST 3-4 TIMES A DAY AS DIRECTED, Disp: , Rfl:     Comfort Touch Plus Lancets 30G MISC, TEST 3-4 TIMES A DAY AS DIRECTED, Disp: , Rfl:     ezetimibe (ZETIA) 10 mg tablet, Take 1 tablet (10 mg total) by mouth daily, Disp: 90 tablet, Rfl: 3    finasteride (PROSCAR) 5 mg tablet, Take 5 mg by mouth daily, Disp: , Rfl:     metFORMIN (GLUCOPHAGE) 500 mg tablet, Take 1 tablet (500 mg total) by mouth 2 (two) times a day with meals ., Disp: 180 tablet, Rfl: 3    metoprolol succinate (TOPROL-XL) 25 mg 24 hr tablet, Take 1 tablet (25 mg total) by mouth daily, Disp: 90 tablet, Rfl: 3    Mounjaro 5 MG/0.5ML, INJECT 0.5 ML (5 MG TOTAL) UNDER THE SKIN EVERY 7 DAYS, Disp: 2 mL, Rfl: 0    olmesartan-hydrochlorothiazide (BENICAR HCT) 40-12.5 MG per tablet, Take 1 tablet by mouth daily, Disp: 90 tablet, Rfl: 3    OneTouch Ultra test strip, TEST 3-4 TIMES A DAY AS DIRECTED, Disp: 100 each, Rfl: 1    Silodosin 8 MG CAPS, Take 8 mg by mouth daily, Disp: , Rfl:     spironolactone (ALDACTONE) 25 mg tablet, Take 1 tablet (25 mg total) by mouth daily, Disp: 30 tablet, Rfl: 5    tamsulosin (FLOMAX) 0.4 mg, Take 0.4 mg by mouth daily, Disp: , Rfl:     traZODone (DESYREL) 50 mg tablet, Take 1 tablet (50 mg total) by  mouth daily at bedtime, Disp: 30 tablet, Rfl: 5    Past Medical History:   Diagnosis Date    CAD (coronary artery disease)     Coronary artery disease     Diabetes mellitus (HCC)     Diverticulosis     Hyperglycemia     Hyperlipidemia     Hypertension     Need for shingles vaccine     Obesity     Thrombocytopenia (HCC)      Past Surgical History:   Procedure Laterality Date    CARDIOVASCULAR STRESS TEST  03/20/2013    COLONOSCOPY      4/8/2020:diverticulosis    COLONOSCOPY W/ POLYPECTOMY      2/6/17:colon,polyp      CORONARY ARTERY BYPASS GRAFT  01/21/2016    x3       Review of Systems   Respiratory: Negative.  Negative for apnea, cough, shortness of breath and wheezing.    Cardiovascular: Negative.  Negative for chest pain and palpitations.   Gastrointestinal: Negative.  Negative for bowel incontinence.   Genitourinary: Negative.  Negative for bladder incontinence, dysuria and pelvic pain.   Musculoskeletal: Positive for back pain (midline lower back).   Skin: Negative.    Neurological: Negative.  Negative for tingling, weakness and headaches.   Psychiatric/Behavioral: Negative.       /84 (BP Location: Left arm, Patient Position: Sitting, Cuff Size: Standard)   Pulse 72   Temp 98 °F (36.7 °C) (Tympanic)     Bilateral ear impaction      Assessment and Plan     1. Hearing loss of both ears due to cerumen impaction  Ear cerumen removal    Date/Time: 8/8/2024 12:40 PM    Performed by: Miles Ruiz MD  Authorized by: Miles Ruiz MD  Universal Protocol:  Consent: Verbal consent obtained. Written consent not obtained.  Risks and benefits: risks, benefits and alternatives were discussed  Consent given by: patient  Timeout called at: 8/8/2024 12:50 PM.  Patient understanding: patient states understanding of the procedure being performed  Patient identity confirmed: verbally with patient    Patient location:  Clinic  Indications / Diagnosis:  Impaction of cerumen affecting hearing  Procedure details:     Local  anesthetic:  None    Location:  L ear and R ear    Procedure type: irrigation with instrumentation      Instrumentation: curette and suction      Approach:  External  Post-procedure details:     Complication:  None    Hearing quality:  Improved    Patient tolerance of procedure:  Tolerated well, no immediate complications  Comments:      Persistent impaction of the left ear    Serous persistent impaction of the left ear patient was recommended to apply Debrox for another week and come back for ear lavage

## 2024-08-15 ENCOUNTER — OFFICE VISIT (OUTPATIENT)
Dept: FAMILY MEDICINE CLINIC | Facility: CLINIC | Age: 79
End: 2024-08-15
Payer: COMMERCIAL

## 2024-08-15 VITALS
BODY MASS INDEX: 33.95 KG/M2 | HEIGHT: 63 IN | RESPIRATION RATE: 18 BRPM | TEMPERATURE: 98 F | SYSTOLIC BLOOD PRESSURE: 142 MMHG | WEIGHT: 191.6 LBS | HEART RATE: 62 BPM | DIASTOLIC BLOOD PRESSURE: 80 MMHG | OXYGEN SATURATION: 97 %

## 2024-08-15 DIAGNOSIS — H90.6 MIXED CONDUCTIVE AND SENSORINEURAL HEARING LOSS OF BOTH EARS: Primary | ICD-10-CM

## 2024-08-15 DIAGNOSIS — H61.22 IMPACTED CERUMEN OF LEFT EAR: ICD-10-CM

## 2024-08-15 PROCEDURE — 99213 OFFICE O/P EST LOW 20 MIN: CPT

## 2024-08-15 PROCEDURE — 69210 REMOVE IMPACTED EAR WAX UNI: CPT

## 2024-08-15 NOTE — ASSESSMENT & PLAN NOTE
Right ear clear of debris. Tolerated left ear lavage with immediate improvement in hearing. Continue bilateral hearing aid.

## 2024-08-15 NOTE — PROGRESS NOTES
"Ambulatory Visit  Name: Neel Verma      : 1945      MRN: [id number]  Encounter Provider: South Garcia MD  Encounter Date: 8/15/2024   Encounter department: [facility] CARE [facility]    Assessment & Plan   1. Mixed conductive and sensorineural hearing loss of both ears  Assessment & Plan:  Right ear clear of debris. Tolerated left ear lavage with immediate improvement in hearing. Continue bilateral hearing aid.       History of Present Illness     Pleasant 78yo male presenting to clinic for left ear lavage. Endorsing no other concerns.         Review of Systems   Constitutional:  Negative for chills and fever.   HENT:  Positive for hearing loss. Negative for ear discharge and ear pain.      Objective     /80 (BP Location: Left arm, Patient Position: Sitting, Cuff Size: Standard)   Pulse 62   Temp 98 °F (36.7 °C) (Temporal)   Resp 18   Ht 5' 3\" (1.6 m)   Wt 86.9 kg (191 lb 9.6 oz)   SpO2 97%   BMI 33.94 kg/m²     Physical Exam  Vitals and nursing note reviewed.   Constitutional:       Appearance: Normal appearance.   HENT:      Right Ear: Tympanic membrane, ear canal and external ear normal.      Left Ear: There is impacted cerumen.   Eyes:      Conjunctiva/sclera: Conjunctivae normal.   Skin:     General: Skin is warm and dry.   Neurological:      General: No focal deficit present.      Mental Status: He is alert.   Psychiatric:         Mood and Affect: Mood normal.         Behavior: Behavior normal.     Ear cerumen removal    Date/Time: 8/15/2024 3:15 PM    Performed by: South Garcia MD  Authorized by: South Garcia MD  Universal Protocol:  procedure performed by consultantConsent: Verbal consent obtained.  Risks and benefits: risks, benefits and alternatives were discussed  Consent given by: patient  Timeout called at: 8/15/2024 3:25 PM.  Patient understanding: patient states understanding of the procedure being performed  Patient consent: the " patient's understanding of the procedure matches consent given  Procedure consent: procedure consent matches procedure scheduled  Patient identity confirmed: verbally with patient    Patient location:  Clinic  Procedure details:     Local anesthetic:  None    Location:  L ear    Procedure type: irrigation with instrumentation      Instrumentation: curette      Approach:  External  Post-procedure details:     Complication:  None    Hearing quality:  Improved    Patient tolerance of procedure:  Tolerated well, no immediate complications  Comments:      Complete removal of cerumen with intact tympanic membrane being visualized post procedure.      Administrative Statements

## 2024-08-17 DIAGNOSIS — E11.65 TYPE 2 DIABETES MELLITUS WITH HYPERGLYCEMIA, WITHOUT LONG-TERM CURRENT USE OF INSULIN (HCC): Primary | ICD-10-CM

## 2024-08-18 DIAGNOSIS — E11.65 TYPE 2 DIABETES MELLITUS WITH HYPERGLYCEMIA, WITHOUT LONG-TERM CURRENT USE OF INSULIN (HCC): Primary | ICD-10-CM

## 2024-08-18 RX ORDER — LANCETS 33 GAUGE
EACH MISCELLANEOUS
Qty: 300 EACH | Refills: 0 | Status: SHIPPED | OUTPATIENT
Start: 2024-08-18

## 2024-08-19 RX ORDER — LANCETS 30 GAUGE
EACH MISCELLANEOUS
Qty: 300 EACH | Refills: 3 | Status: SHIPPED | OUTPATIENT
Start: 2024-08-19

## 2024-08-28 DIAGNOSIS — F51.01 PRIMARY INSOMNIA: ICD-10-CM

## 2024-08-29 RX ORDER — TRAZODONE HYDROCHLORIDE 50 MG/1
50 TABLET, FILM COATED ORAL
Qty: 30 TABLET | Refills: 5 | Status: SHIPPED | OUTPATIENT
Start: 2024-08-29

## 2024-09-17 DIAGNOSIS — E11.65 TYPE 2 DIABETES MELLITUS WITH HYPERGLYCEMIA, WITHOUT LONG-TERM CURRENT USE OF INSULIN (HCC): ICD-10-CM

## 2024-09-18 RX ORDER — BLOOD SUGAR DIAGNOSTIC
STRIP MISCELLANEOUS
Qty: 100 EACH | Refills: 1 | Status: SHIPPED | OUTPATIENT
Start: 2024-09-18

## 2024-09-18 RX ORDER — LANCETS 30 GAUGE
EACH MISCELLANEOUS
Qty: 300 EACH | Refills: 3 | Status: SHIPPED | OUTPATIENT
Start: 2024-09-18

## 2024-10-01 ENCOUNTER — IMMUNIZATIONS (OUTPATIENT)
Dept: FAMILY MEDICINE CLINIC | Facility: CLINIC | Age: 79
End: 2024-10-01
Payer: COMMERCIAL

## 2024-10-01 VITALS — TEMPERATURE: 98.1 F

## 2024-10-01 DIAGNOSIS — Z23 ENCOUNTER FOR IMMUNIZATION: Primary | ICD-10-CM

## 2024-10-01 PROCEDURE — 90662 IIV NO PRSV INCREASED AG IM: CPT

## 2024-10-01 PROCEDURE — G0008 ADMIN INFLUENZA VIRUS VAC: HCPCS

## 2024-10-14 DIAGNOSIS — R35.1 BPH ASSOCIATED WITH NOCTURIA: Primary | ICD-10-CM

## 2024-10-14 DIAGNOSIS — E11.65 TYPE 2 DIABETES MELLITUS WITH HYPERGLYCEMIA, WITHOUT LONG-TERM CURRENT USE OF INSULIN (HCC): ICD-10-CM

## 2024-10-14 DIAGNOSIS — N40.1 BPH ASSOCIATED WITH NOCTURIA: Primary | ICD-10-CM

## 2024-10-17 ENCOUNTER — APPOINTMENT (OUTPATIENT)
Dept: LAB | Facility: HOSPITAL | Age: 79
End: 2024-10-17
Payer: COMMERCIAL

## 2024-10-17 DIAGNOSIS — Z87.898 HISTORY OF ELEVATED PROSTATE SPECIFIC ANTIGEN (PSA): ICD-10-CM

## 2024-10-17 LAB
PSA FREE MFR SERPL: 34.19 %
PSA FREE SERPL-MCNC: 0.59 NG/ML
PSA SERPL-MCNC: 1.72 NG/ML (ref 0–4)

## 2024-10-17 PROCEDURE — 36415 COLL VENOUS BLD VENIPUNCTURE: CPT

## 2024-10-17 PROCEDURE — 84154 ASSAY OF PSA FREE: CPT

## 2024-10-17 PROCEDURE — 84153 ASSAY OF PSA TOTAL: CPT

## 2024-10-18 RX ORDER — TAMSULOSIN HYDROCHLORIDE 0.4 MG/1
0.4 CAPSULE ORAL DAILY
Qty: 90 CAPSULE | Refills: 3 | Status: SHIPPED | OUTPATIENT
Start: 2024-10-18

## 2024-10-18 RX ORDER — BLOOD SUGAR DIAGNOSTIC
STRIP MISCELLANEOUS
Qty: 100 EACH | Refills: 1 | Status: SHIPPED | OUTPATIENT
Start: 2024-10-18

## 2024-10-28 DIAGNOSIS — E11.65 TYPE 2 DIABETES MELLITUS WITH HYPERGLYCEMIA, WITHOUT LONG-TERM CURRENT USE OF INSULIN (HCC): ICD-10-CM

## 2024-10-29 RX ORDER — BLOOD SUGAR DIAGNOSTIC
STRIP MISCELLANEOUS
Qty: 100 EACH | Refills: 5 | Status: SHIPPED | OUTPATIENT
Start: 2024-10-29

## 2024-11-06 DIAGNOSIS — E11.65 TYPE 2 DIABETES MELLITUS WITH HYPERGLYCEMIA, WITHOUT LONG-TERM CURRENT USE OF INSULIN (HCC): ICD-10-CM

## 2024-11-07 DIAGNOSIS — I10 ESSENTIAL HYPERTENSION: ICD-10-CM

## 2024-11-07 RX ORDER — ATORVASTATIN CALCIUM 80 MG/1
80 TABLET, FILM COATED ORAL DAILY
Qty: 90 TABLET | Refills: 1 | Status: SHIPPED | OUTPATIENT
Start: 2024-11-07

## 2024-11-12 ENCOUNTER — OFFICE VISIT (OUTPATIENT)
Dept: FAMILY MEDICINE CLINIC | Facility: CLINIC | Age: 79
End: 2024-11-12
Payer: COMMERCIAL

## 2024-11-12 VITALS
TEMPERATURE: 98 F | HEIGHT: 63 IN | SYSTOLIC BLOOD PRESSURE: 110 MMHG | DIASTOLIC BLOOD PRESSURE: 70 MMHG | WEIGHT: 186 LBS | OXYGEN SATURATION: 98 % | BODY MASS INDEX: 32.96 KG/M2 | RESPIRATION RATE: 16 BRPM | HEART RATE: 72 BPM

## 2024-11-12 DIAGNOSIS — N40.1 BPH ASSOCIATED WITH NOCTURIA: ICD-10-CM

## 2024-11-12 DIAGNOSIS — H61.22 IMPACTED CERUMEN OF LEFT EAR: ICD-10-CM

## 2024-11-12 DIAGNOSIS — N18.31 CKD STAGE G3A/A2, GFR 45-59 AND ALBUMIN CREATININE RATIO 30-299 MG/G (HCC): ICD-10-CM

## 2024-11-12 DIAGNOSIS — Z00.00 MEDICARE ANNUAL WELLNESS VISIT, SUBSEQUENT: Primary | ICD-10-CM

## 2024-11-12 DIAGNOSIS — I25.810 CORONARY ARTERY DISEASE INVOLVING CORONARY BYPASS GRAFT OF NATIVE HEART WITHOUT ANGINA PECTORIS: ICD-10-CM

## 2024-11-12 DIAGNOSIS — E78.00 PURE HYPERCHOLESTEROLEMIA: ICD-10-CM

## 2024-11-12 DIAGNOSIS — I10 PRIMARY HYPERTENSION: ICD-10-CM

## 2024-11-12 DIAGNOSIS — E11.9 TYPE 2 DIABETES MELLITUS WITHOUT COMPLICATION, WITHOUT LONG-TERM CURRENT USE OF INSULIN (HCC): ICD-10-CM

## 2024-11-12 DIAGNOSIS — R35.1 BPH ASSOCIATED WITH NOCTURIA: ICD-10-CM

## 2024-11-12 LAB
CREAT UR-MCNC: 218.4 MG/DL
MICROALBUMIN UR-MCNC: 35 MG/L
MICROALBUMIN/CREAT 24H UR: 16 MG/G CREATININE (ref 0–30)

## 2024-11-12 PROCEDURE — 82570 ASSAY OF URINE CREATININE: CPT | Performed by: FAMILY MEDICINE

## 2024-11-12 PROCEDURE — 99215 OFFICE O/P EST HI 40 MIN: CPT | Performed by: FAMILY MEDICINE

## 2024-11-12 PROCEDURE — G0439 PPPS, SUBSEQ VISIT: HCPCS | Performed by: FAMILY MEDICINE

## 2024-11-12 PROCEDURE — 82043 UR ALBUMIN QUANTITATIVE: CPT | Performed by: FAMILY MEDICINE

## 2024-11-12 PROCEDURE — 69209 REMOVE IMPACTED EAR WAX UNI: CPT | Performed by: FAMILY MEDICINE

## 2024-11-12 NOTE — PATIENT INSTRUCTIONS
Medicare Preventive Visit Patient Instructions  Thank you for completing your Welcome to Medicare Visit or Medicare Annual Wellness Visit today. Your next wellness visit will be due in one year (11/13/2025).  The screening/preventive services that you may require over the next 5-10 years are detailed below. Some tests may not apply to you based off risk factors and/or age. Screening tests ordered at today's visit but not completed yet may show as past due. Also, please note that scanned in results may not display below.  Preventive Screenings:  Service Recommendations Previous Testing/Comments   Colorectal Cancer Screening  Colonoscopy    Fecal Occult Blood Test (FOBT)/Fecal Immunochemical Test (FIT)  Fecal DNA/Cologuard Test  Flexible Sigmoidoscopy Age: 45-75 years old   Colonoscopy: every 10 years (May be performed more frequently if at higher risk)  OR  FOBT/FIT: every 1 year  OR  Cologuard: every 3 years  OR  Sigmoidoscopy: every 5 years  Screening may be recommended earlier than age 45 if at higher risk for colorectal cancer. Also, an individualized decision between you and your healthcare provider will decide whether screening between the ages of 76-85 would be appropriate. Colonoscopy: 04/08/2020  FOBT/FIT: Not on file  Cologuard: Not on file  Sigmoidoscopy: Not on file          Prostate Cancer Screening Individualized decision between patient and health care provider in men between ages of 55-69   Medicare will cover every 12 months beginning on the day after your 50th birthday PSA: 1.717 ng/mL     Screening Not Indicated     Hepatitis C Screening Once for adults born between 1945 and 1965  More frequently in patients at high risk for Hepatitis C Hep C Antibody: 04/15/2022    Screening Current   Diabetes Screening 1-2 times per year if you're at risk for diabetes or have pre-diabetes Fasting glucose: 99 mg/dL (6/19/2024)  A1C: 6.0 % (6/19/2024)  Screening Not Indicated  History Diabetes   Cholesterol  Screening Once every 5 years if you don't have a lipid disorder. May order more often based on risk factors. Lipid panel: 02/27/2024  Screening Not Indicated  History Lipid Disorder      Other Preventive Screenings Covered by Medicare:  Abdominal Aortic Aneurysm (AAA) Screening: covered once if your at risk. You're considered to be at risk if you have a family history of AAA or a male between the age of 65-75 who smoking at least 100 cigarettes in your lifetime.  Lung Cancer Screening: covers low dose CT scan once per year if you meet all of the following conditions: (1) Age 55-77; (2) No signs or symptoms of lung cancer; (3) Current smoker or have quit smoking within the last 15 years; (4) You have a tobacco smoking history of at least 20 pack years (packs per day x number of years you smoked); (5) You get a written order from a healthcare provider.  Glaucoma Screening: covered annually if you're considered high risk: (1) You have diabetes OR (2) Family history of glaucoma OR (3)  aged 50 and older OR (4)  American aged 65 and older  Osteoporosis Screening: covered every 2 years if you meet one of the following conditions: (1) Have a vertebral abnormality; (2) On glucocorticoid therapy for more than 3 months; (3) Have primary hyperparathyroidism; (4) On osteoporosis medications and need to assess response to drug therapy.  HIV Screening: covered annually if you're between the age of 15-65. Also covered annually if you are younger than 15 and older than 65 with risk factors for HIV infection. For pregnant patients, it is covered up to 3 times per pregnancy.    Immunizations:  Immunization Recommendations   Influenza Vaccine Annual influenza vaccination during flu season is recommended for all persons aged >= 6 months who do not have contraindications   Pneumococcal Vaccine   * Pneumococcal conjugate vaccine = PCV13 (Prevnar 13), PCV15 (Vaxneuvance), PCV20 (Prevnar 20)  * Pneumococcal  polysaccharide vaccine = PPSV23 (Pneumovax) Adults 19-63 yo with certain risk factors or if 65+ yo  If never received any pneumonia vaccine: recommend Prevnar 20 (PCV20)  Give PCV20 if previously received 1 dose of PCV13 or PPSV23   Hepatitis B Vaccine 3 dose series if at intermediate or high risk (ex: diabetes, end stage renal disease, liver disease)   Respiratory syncytial virus (RSV) Vaccine - COVERED BY MEDICARE PART D  * RSVPreF3 (Arexvy) CDC recommends that adults 60 years of age and older may receive a single dose of RSV vaccine using shared clinical decision-making (SCDM)   Tetanus (Td) Vaccine - COST NOT COVERED BY MEDICARE PART B Following completion of primary series, a booster dose should be given every 10 years to maintain immunity against tetanus. Td may also be given as tetanus wound prophylaxis.   Tdap Vaccine - COST NOT COVERED BY MEDICARE PART B Recommended at least once for all adults. For pregnant patients, recommended with each pregnancy.   Shingles Vaccine (Shingrix) - COST NOT COVERED BY MEDICARE PART B  2 shot series recommended in those 19 years and older who have or will have weakened immune systems or those 50 years and older     Health Maintenance Due:      Topic Date Due   • Colorectal Cancer Screening  10/12/2027 (Originally 3/14/1991)   • Hepatitis C Screening  Completed     Immunizations Due:      Topic Date Due   • COVID-19 Vaccine (4 - 2023-24 season) 09/01/2024     Advance Directives   What are advance directives?  Advance directives are legal documents that state your wishes and plans for medical care. These plans are made ahead of time in case you lose your ability to make decisions for yourself. Advance directives can apply to any medical decision, such as the treatments you want, and if you want to donate organs.   What are the types of advance directives?  There are many types of advance directives, and each state has rules about how to use them. You may choose a combination  of any of the following:  Living will:  This is a written record of the treatment you want. You can also choose which treatments you do not want, which to limit, and which to stop at a certain time. This includes surgery, medicine, IV fluid, and tube feedings.   Durable power of  for healthcare (DPAHC):  This is a written record that states who you want to make healthcare choices for you when you are unable to make them for yourself. This person, called a proxy, is usually a family member or a friend. You may choose more than 1 proxy.  Do not resuscitate (DNR) order:  A DNR order is used in case your heart stops beating or you stop breathing. It is a request not to have certain forms of treatment, such as CPR. A DNR order may be included in other types of advance directives.  Medical directive:  This covers the care that you want if you are in a coma, near death, or unable to make decisions for yourself. You can list the treatments you want for each condition. Treatment may include pain medicine, surgery, blood transfusions, dialysis, IV or tube feedings, and a ventilator (breathing machine).  Values history:  This document has questions about your views, beliefs, and how you feel and think about life. This information can help others choose the care that you would choose.  Why are advance directives important?  An advance directive helps you control your care. Although spoken wishes may be used, it is better to have your wishes written down. Spoken wishes can be misunderstood, or not followed. Treatments may be given even if you do not want them. An advance directive may make it easier for your family to make difficult choices about your care.   Weight Management   Why it is important to manage your weight:  Being overweight increases your risk of health conditions such as heart disease, high blood pressure, type 2 diabetes, and certain types of cancer. It can also increase your risk for osteoarthritis,  sleep apnea, and other respiratory problems. Aim for a slow, steady weight loss. Even a small amount of weight loss can lower your risk of health problems.  How to lose weight safely:  A safe and healthy way to lose weight is to eat fewer calories and get regular exercise. You can lose up about 1 pound a week by decreasing the number of calories you eat by 500 calories each day.   Healthy meal plan for weight management:  A healthy meal plan includes a variety of foods, contains fewer calories, and helps you stay healthy. A healthy meal plan includes the following:  Eat whole-grain foods more often.  A healthy meal plan should contain fiber. Fiber is the part of grains, fruits, and vegetables that is not broken down by your body. Whole-grain foods are healthy and provide extra fiber in your diet. Some examples of whole-grain foods are whole-wheat breads and pastas, oatmeal, brown rice, and bulgur.  Eat a variety of vegetables every day.  Include dark, leafy greens such as spinach, kale, qian greens, and mustard greens. Eat yellow and orange vegetables such as carrots, sweet potatoes, and winter squash.   Eat a variety of fruits every day.  Choose fresh or canned fruit (canned in its own juice or light syrup) instead of juice. Fruit juice has very little or no fiber.  Eat low-fat dairy foods.  Drink fat-free (skim) milk or 1% milk. Eat fat-free yogurt and low-fat cottage cheese. Try low-fat cheeses such as mozzarella and other reduced-fat cheeses.  Choose meat and other protein foods that are low in fat.  Choose beans or other legumes such as split peas or lentils. Choose fish, skinless poultry (chicken or turkey), or lean cuts of red meat (beef or pork). Before you cook meat or poultry, cut off any visible fat.   Use less fat and oil.  Try baking foods instead of frying them. Add less fat, such as margarine, sour cream, regular salad dressing and mayonnaise to foods. Eat fewer high-fat foods. Some examples of  high-fat foods include french fries, doughnuts, ice cream, and cakes.  Eat fewer sweets.  Limit foods and drinks that are high in sugar. This includes candy, cookies, regular soda, and sweetened drinks.  Exercise:  Exercise at least 30 minutes per day on most days of the week. Some examples of exercise include walking, biking, dancing, and swimming. You can also fit in more physical activity by taking the stairs instead of the elevator or parking farther away from stores. Ask your healthcare provider about the best exercise plan for you.      © Copyright [employer] 2018 Information is for End User's use only and may not be sold, redistributed or otherwise used for commercial purposes. All illustrations and images included in CareNotes® are the copyrighted property of A.D.A.M., Inc. or [employer]

## 2024-11-12 NOTE — ASSESSMENT & PLAN NOTE
Lab Results   Component Value Date    HGBA1C 6.0 (H) 06/19/2024     - Excellent control with HbA1c 6.0% (June)  - Next HbA1c check scheduled for December  Neel is showing improved control of hyperglycemia, as indicated by the percentage of HbA1C above. Expressed concerns about  statin therapy and kidney protection treatment. We discussed the shared goals of treatment, which he accepted. The primary aim is to reduce the risk of vascular disease and its complications. I explained the potential complications of uncontrolled diabetes and the symptoms of hypoglycemia. We emphasized the importance of following up with diabetes educators and making lifestyle modifications. I encouraged to remain compliant with his treatment plan and to call if he experiences any side effects. To bring a blood sugar logbook to his next appointment.           Orders:    Albumin / creatinine urine ratio; Future    IRIS Diabetic eye exam    Albumin / creatinine urine ratio

## 2024-11-12 NOTE — PROGRESS NOTES
Ambulatory Visit  Name: Neel Verma      : 1945      MRN: [id number]  Encounter Provider: Miles Ruiz MD  Encounter Date: 2024   Encounter department: [facility] CARE [facility]  Medicare annual wellness visit, subsequent         CKD stage G3a/A2, GFR 45-59 and albumin creatinine ratio  mg/g ([facility])  Lab Results   Component Value Date    EGFR 49 2024    EGFR 48 2024    EGFR 35 2024    CREATININE 1.35 (H) 2024    CREATININE 1.38 (H) 2024    CREATININE 1.80 (H) 2024     Chronic Kidney Disease Stage 3:  - Stable with GFR 49, unchanged from previous year  - Continue current management  - Renal function monitoring scheduled for December         BPH associated with nocturia  2. Benign Prostatic Hyperplasia:  - Current medications: Silodosin and Finasteride 5mg  - Following with urology for prostate volume of 152cc  - Documented urinary retention       Primary hypertension  Hypertension:  - Well-controlled (/70)  - Medications adjusted:  * Continue Olmesartan/HCTZ 40/12.5mg  * Continue Metoprolol 25mg  * Discontinue Spironolactone 25mg       Coronary artery disease involving coronary bypass graft of native heart without angina pectoris   Cardiovascular Disease:  - Post CABG (20 years ago)  - Modify antiplatelet therapy:  * Discontinue Clopidogrel due to bleeding risk  * Continue Aspirin alone  - Due for cardiology follow-up       Pure hypercholesterolemia  Hyperlipidemia:  - Well-controlled  - Continue Atorvastatin       Type 2 diabetes mellitus without complication, without long-term current use of insulin ([facility])    Lab Results   Component Value Date    HGBA1C 6.0 (H) 2024     - Excellent control with HbA1c 6.0% ()  - Next HbA1c check scheduled for December  Neel is showing improved control of hyperglycemia, as indicated by the percentage of HbA1C above. Expressed concerns about  statin therapy and  kidney protection treatment. We discussed the shared goals of treatment, which he accepted. The primary aim is to reduce the risk of vascular disease and its complications. I explained the potential complications of uncontrolled diabetes and the symptoms of hypoglycemia. We emphasized the importance of following up with diabetes educators and making lifestyle modifications. I encouraged to remain compliant with his treatment plan and to call if he experiences any side effects. To bring a blood sugar logbook to his next appointment.           Orders:    Albumin / creatinine urine ratio; Future    IRIS Diabetic eye exam    Albumin / creatinine urine ratio    Impacted cerumen of left ear  Excellent result after ear lavage,  He uses Hearing aids. Hearing improved.          Preventive health issues were discussed with patient, and age appropriate screening tests were ordered as noted in patient's After Visit Summary. Personalized health advice and appropriate referrals for health education or preventive services given if needed, as noted in patient's After Visit Summary.    History of Present Illness     79-year-old male presenting for annual Medicare wellness visit. Patient reports adequate urination with current medications but notes nocturia 3-4 times per night, associated with nighttime water intake due to dry mouth. Denies any new cardiovascular symptoms. Reports compliance with current medication regimen. Patient has completed advance directives, naming his wife Roxane Verma as healthcare proxy, with son Reggie as secondary contact. Patient agrees to organ donation if applicable.                     Patient Care Team:  Miles Ruiz MD as PCP - General (Family Medicine)    Review of Systems  Medical History Reviewed by provider this encounter:       Annual Wellness Visit Questionnaire   Neel is here for his Subsequent Wellness visit. Last Medicare Wellness visit information reviewed, patient interviewed and  updates made to the record today.      Health Risk Assessment:   Patient rates overall health as good. Patient feels that their physical health rating is same. Patient is satisfied with their life. Eyesight was rated as same. Hearing was rated as slightly worse. Patient feels that their emotional and mental health rating is same. Patients states they are never, rarely angry. Patient states they are sometimes unusually tired/fatigued. Pain experienced in the last 7 days has been some. Patient's pain rating has been 2/10. Patient states that he has experienced no weight loss or gain in last 6 months.     Fall Risk Screening:   In the past year, patient has experienced: no history of falling in past year      Home Safety:  Patient does not have trouble with stairs inside or outside of their home. Patient has working smoke alarms and has working carbon monoxide detector. Home safety hazards include: none.     Nutrition:   Current diet is Diabetic.     Medications:   Patient is currently taking over-the-counter supplements. OTC medications include: see medication list. Patient is able to manage medications.     Activities of Daily Living (ADLs)/Instrumental Activities of Daily Living (IADLs):   Walk and transfer into and out of bed and chair?: Yes  Dress and groom yourself?: Yes    Bathe or shower yourself?: Yes    Feed yourself? Yes  Do your laundry/housekeeping?: Yes  Manage your money, pay your bills and track your expenses?: Yes  Make your own meals?: Yes    Do your own shopping?: Yes    Previous Hospitalizations:   Any hospitalizations or ED visits within the last 12 months?: Yes    How many hospitalizations have you had in the last year?: 1-2    Advance Care Planning:   Living will: No    Durable POA for healthcare: No    Advanced directive: No    Advanced directive counseling given: Yes    Five wishes given: No    Patient declined ACP directive: No    End of Life Decisions reviewed with patient: Yes    Provider  agrees with end of life decisions: No      Cognitive Screening:   Provider or family/friend/caregiver concerned regarding cognition?: No    PREVENTIVE SCREENINGS      Cardiovascular Screening:    General: Screening Not Indicated and History Lipid Disorder    Due for: Lipid Panel      Diabetes Screening:     General: Screening Not Indicated and History Diabetes    Due for: Blood Glucose      Colorectal Cancer Screening:       Due for: FOBT/FIT      Prostate Cancer Screening:    General: Screening Not Indicated      Osteoporosis Screening:    General: Risks and Benefits Discussed    Due for: DXA Axial      Abdominal Aortic Aneurysm (AAA) Screening:    Risk factors include: tobacco use        General: Screening Not Indicated      Lung Cancer Screening:     General: Screening Not Indicated      Hepatitis C Screening:    General: Screening Current    Hep C Screening Accepted: Yes      Screening, Brief Intervention, and Referral to Treatment (SBIRT)    Screening  Typical number of drinks in a day: 0  Typical number of drinks in a week: 0  Interpretation: Low risk drinking behavior.    Single Item Drug Screening:  How often have you used an illegal drug (including marijuana) or a prescription medication for non-medical reasons in the past year? never    Single Item Drug Screen Score: 0  Interpretation: Negative screen for possible drug use disorder    Other Counseling Topics:   Alcohol use counseling, car/seat belt/driving safety, skin self-exam, sunscreen and calcium and vitamin D intake and regular weightbearing exercise.     Social Determinants of Health     Financial Resource Strain: Low Risk  (10/25/2023)    Overall Financial Resource Strain (CARDIA)     Difficulty of Paying Living Expenses: Not hard at all   Food Insecurity: No Food Insecurity (11/12/2024)    Hunger Vital Sign     Worried About Running Out of Food in the Last Year: Never true     Ran Out of Food in the Last Year: Never true   Transportation Needs: No  "Transportation Needs (11/12/2024)    PRAPARE - Transportation     Lack of Transportation (Medical): No     Lack of Transportation (Non-Medical): No   Housing Stability: Unknown (11/12/2024)    Housing Stability Vital Sign     Unable to Pay for Housing in the Last Year: No   Utilities: Not At Risk (11/12/2024)    [facility] Utilities     Threatened with loss of utilities: No     No results found.    Objective     /70 (BP Location: Left arm, Patient Position: Sitting, Cuff Size: Standard)   Pulse 72   Temp 98 °F (36.7 °C) (Tympanic)   Resp 16   Ht 5' 3\" (1.6 m)   Wt 84.4 kg (186 lb)   SpO2 98%   BMI 32.95 kg/m²     Physical Exam  Non distress, Impacted ears with cerumen. Removed via lavage. Normal respiratory effort. Pulse is regular. Heart without murmurs.         Ear cerumen removal    Date/Time: 11/12/2024 2:40 PM    Performed by: Miles Ruiz MD  Authorized by: Miles Ruiz MD  [place] Protocol:  procedure performed by consultantConsent: Verbal consent obtained.  Consent given by: patient  Timeout called at: 11/12/2024 3:06 PM.  Patient understanding: patient states understanding of the procedure being performed  Patient consent: the patient's understanding of the procedure matches consent given  Patient identity confirmed: verbally with patient    Patient location:  Clinic  Procedure details:     Local anesthetic:  None    Location:  Both ears    Procedure type: irrigation only      Approach:  External  Post-procedure details:     Complication:  None    Hearing quality:  Improved    Patient tolerance of procedure:  Tolerated well, no immediate complications      "

## 2024-11-13 NOTE — ASSESSMENT & PLAN NOTE
2. Benign Prostatic Hyperplasia:  - Current medications: Silodosin and Finasteride 5mg  - Following with urology for prostate volume of 152cc  - Documented urinary retention

## 2024-11-13 NOTE — ASSESSMENT & PLAN NOTE
Cardiovascular Disease:  - Post CABG (20 years ago)  - Modify antiplatelet therapy:  * Discontinue Clopidogrel due to bleeding risk  * Continue Aspirin alone  - Due for cardiology follow-up

## 2024-11-13 NOTE — ASSESSMENT & PLAN NOTE
Hypertension:  - Well-controlled (/70)  - Medications adjusted:  * Continue Olmesartan/HCTZ 40/12.5mg  * Continue Metoprolol 25mg  * Discontinue Spironolactone 25mg

## 2024-11-13 NOTE — ASSESSMENT & PLAN NOTE
Lab Results   Component Value Date    EGFR 49 06/19/2024    EGFR 48 04/13/2024    EGFR 35 04/08/2024    CREATININE 1.35 (H) 06/19/2024    CREATININE 1.38 (H) 04/13/2024    CREATININE 1.80 (H) 04/08/2024     Chronic Kidney Disease Stage 3:  - Stable with GFR 49, unchanged from previous year  - Continue current management  - Renal function monitoring scheduled for December

## 2024-11-14 ENCOUNTER — RESULTS FOLLOW-UP (OUTPATIENT)
Dept: FAMILY MEDICINE CLINIC | Facility: CLINIC | Age: 79
End: 2024-11-14

## 2024-11-15 NOTE — RESULT ENCOUNTER NOTE
Neel, the urine test is stable. This test is to check for protein in the urine and analyze whether the kidney is suffering from the effect of Diabetes.

## 2024-12-02 DIAGNOSIS — E78.00 PURE HYPERCHOLESTEROLEMIA: ICD-10-CM

## 2024-12-05 DIAGNOSIS — F51.01 PRIMARY INSOMNIA: ICD-10-CM

## 2024-12-05 RX ORDER — TRAZODONE HYDROCHLORIDE 50 MG/1
50 TABLET, FILM COATED ORAL
Qty: 30 TABLET | Refills: 5 | OUTPATIENT
Start: 2024-12-05

## 2024-12-05 RX ORDER — EZETIMIBE 10 MG/1
10 TABLET ORAL DAILY
Qty: 90 TABLET | Refills: 3 | Status: SHIPPED | OUTPATIENT
Start: 2024-12-05

## 2024-12-10 DIAGNOSIS — E11.65 TYPE 2 DIABETES MELLITUS WITH HYPERGLYCEMIA, WITHOUT LONG-TERM CURRENT USE OF INSULIN (HCC): ICD-10-CM

## 2024-12-11 RX ORDER — TIRZEPATIDE 5 MG/.5ML
INJECTION, SOLUTION SUBCUTANEOUS
Qty: 2 ML | Refills: 1 | Status: SHIPPED | OUTPATIENT
Start: 2024-12-11

## 2024-12-23 ENCOUNTER — RESULTS FOLLOW-UP (OUTPATIENT)
Dept: FAMILY MEDICINE CLINIC | Facility: CLINIC | Age: 79
End: 2024-12-23

## 2024-12-23 ENCOUNTER — OFFICE VISIT (OUTPATIENT)
Dept: FAMILY MEDICINE CLINIC | Facility: CLINIC | Age: 79
End: 2024-12-23
Payer: COMMERCIAL

## 2024-12-23 VITALS
BODY MASS INDEX: 32.5 KG/M2 | DIASTOLIC BLOOD PRESSURE: 70 MMHG | OXYGEN SATURATION: 98 % | TEMPERATURE: 96.2 F | HEART RATE: 75 BPM | WEIGHT: 183.4 LBS | RESPIRATION RATE: 16 BRPM | HEIGHT: 63 IN | SYSTOLIC BLOOD PRESSURE: 122 MMHG

## 2024-12-23 DIAGNOSIS — E66.09 CLASS 1 OBESITY DUE TO EXCESS CALORIES WITH SERIOUS COMORBIDITY AND BODY MASS INDEX (BMI) OF 32.0 TO 32.9 IN ADULT: ICD-10-CM

## 2024-12-23 DIAGNOSIS — R05.1 ACUTE COUGH: Primary | ICD-10-CM

## 2024-12-23 DIAGNOSIS — I10 PRIMARY HYPERTENSION: ICD-10-CM

## 2024-12-23 DIAGNOSIS — E11.9 TYPE 2 DIABETES MELLITUS WITHOUT COMPLICATION, WITHOUT LONG-TERM CURRENT USE OF INSULIN (HCC): ICD-10-CM

## 2024-12-23 DIAGNOSIS — E66.811 CLASS 1 OBESITY DUE TO EXCESS CALORIES WITH SERIOUS COMORBIDITY AND BODY MASS INDEX (BMI) OF 32.0 TO 32.9 IN ADULT: ICD-10-CM

## 2024-12-23 DIAGNOSIS — N40.1 BENIGN PROSTATIC HYPERPLASIA WITH LOWER URINARY TRACT SYMPTOMS, SYMPTOM DETAILS UNSPECIFIED: ICD-10-CM

## 2024-12-23 PROBLEM — R39.198 SLOW URINARY STREAM: Status: RESOLVED | Noted: 2024-12-11 | Resolved: 2024-12-23

## 2024-12-23 PROBLEM — R35.0 INCREASED FREQUENCY OF URINATION: Status: RESOLVED | Noted: 2024-12-11 | Resolved: 2024-12-23

## 2024-12-23 LAB
LEFT EYE DIABETIC RETINOPATHY: NORMAL
LEFT EYE IMAGE QUALITY: NORMAL
LEFT EYE MACULAR EDEMA: NORMAL
LEFT EYE OTHER RETINOPATHY: NORMAL
RIGHT EYE DIABETIC RETINOPATHY: NORMAL
RIGHT EYE IMAGE QUALITY: NORMAL
RIGHT EYE MACULAR EDEMA: NORMAL
RIGHT EYE OTHER RETINOPATHY: NORMAL
SEVERITY (EYE EXAM): NORMAL
SL AMB POCT HEMOGLOBIN AIC: 5.6 (ref ?–6.5)

## 2024-12-23 PROCEDURE — 83036 HEMOGLOBIN GLYCOSYLATED A1C: CPT | Performed by: PHYSICIAN ASSISTANT

## 2024-12-23 PROCEDURE — 99214 OFFICE O/P EST MOD 30 MIN: CPT | Performed by: PHYSICIAN ASSISTANT

## 2024-12-23 RX ORDER — DEXTROMETHORPHAN HYDROBROMIDE AND PROMETHAZINE HYDROCHLORIDE 15; 6.25 MG/5ML; MG/5ML
5 SYRUP ORAL 4 TIMES DAILY PRN
Qty: 240 ML | Refills: 0 | Status: SHIPPED | OUTPATIENT
Start: 2024-12-23

## 2024-12-23 RX ORDER — AZITHROMYCIN 250 MG/1
TABLET, FILM COATED ORAL
Qty: 6 TABLET | Refills: 0 | Status: SHIPPED | OUTPATIENT
Start: 2024-12-23 | End: 2024-12-27

## 2024-12-23 NOTE — ASSESSMENT & PLAN NOTE
Stable and controlled blood pressure on olmesartan/HCTZ.    BP Readings from Last 3 Encounters:   12/23/24 122/70   11/12/24 110/70   08/29/24 126/70

## 2024-12-23 NOTE — ASSESSMENT & PLAN NOTE
Lab Results   Component Value Date    HGBA1C 5.6 12/23/2024     Lab Results   Component Value Date    HGBA1C 5.6 12/23/2024     Very well-controlled.  Improved on Ozempic 2 mg once weekly, Ozempic was more affordable than Mounjaro.  Continue metformin 500 mg once daily.    Orders:  •  POCT hemoglobin A1c  •  IRIS Diabetic eye exam  •  metFORMIN (GLUCOPHAGE) 500 mg tablet; Take 1 tablet (500 mg total) by mouth daily with breakfast .

## 2024-12-23 NOTE — ASSESSMENT & PLAN NOTE
Weight loss with use of Ozempic.  Continue to monitor weight.  Encouraged well-balanced diet.    Wt Readings from Last 3 Encounters:   12/23/24 83.2 kg (183 lb 6.4 oz)   11/12/24 84.4 kg (186 lb)   08/29/24 87.5 kg (193 lb)

## 2024-12-23 NOTE — PROGRESS NOTES
Name: Neel Verma      : 1945      MRN: [id number]  Encounter Provider: Bing Alegre PA-C  Encounter Date: 2024   Encounter department: [facility] PRIMARY CARE [facility]  :  Assessment & Plan  Acute cough  X 1 week, wife with similar symptoms, suspect postviral bronchitis, start empiric azithromycin and Prometh/DM for cough.  Former smoker, quit over 10 years ago.  Consider chest x-ray if no improvement.  Orders:  •  azithromycin (ZITHROMAX) 250 mg tablet; Take 2 tablets today then 1 tablet daily x 4 days  •  promethazine-dextromethorphan (PHENERGAN-DM) 6.25-15 mg/5 mL oral syrup; Take 5 mL by mouth 4 (four) times a day as needed for cough    Type 2 diabetes mellitus without complication, without long-term current use of insulin ([facility])    Lab Results   Component Value Date    HGBA1C 5.6 2024     Lab Results   Component Value Date    HGBA1C 5.6 2024     Very well-controlled.  Improved on Ozempic 2 mg once weekly, Ozempic was more affordable than Mounjaro.  Continue metformin 500 mg once daily.    Orders:  •  POCT hemoglobin A1c  •  IRIS Diabetic eye exam  •  metFORMIN (GLUCOPHAGE) 500 mg tablet; Take 1 tablet (500 mg total) by mouth daily with breakfast .    Benign prostatic hyperplasia with lower urinary tract symptoms, symptom details unspecified  Reviewed most recent neurology consult note from 2024.  Plan for cystoscopy.       Class 1 obesity due to excess calories with serious comorbidity and body mass index (BMI) of 32.0 to 32.9 in adult  Weight loss with use of Ozempic.  Continue to monitor weight.  Encouraged well-balanced diet.    Wt Readings from Last 3 Encounters:   24 83.2 kg (183 lb 6.4 oz)   24 84.4 kg (186 lb)   24 87.5 kg (193 lb)            Primary hypertension  Stable and controlled blood pressure on olmesartan/HCTZ.    BP Readings from Last 3 Encounters:   24 122/70   24 110/70   24 126/70                   History  "of Present Illness     Neel is a 79 y.o. male with a h/o CAD s/p CAGBx3, diabetes, HTN, HLD, former smoker who presents as a same-day walk-in patient with his wife for cough.  They both have been coughing for about a week, he started after his wife.  They have been trying over-the-counter cough syrups with minimal improvement.  Wife is worse than him but still with phlegm with coughing that is thick and yellow.  No shortness of breath or wheezing.    History was conducted in [place] without the use of .  Wife present for exam and helped to provide portion of history          Review of Systems   Constitutional:  Negative for chills and fever.   HENT:  Positive for congestion and rhinorrhea. Negative for postnasal drip, sinus pressure, sinus pain, sore throat and trouble swallowing.    Eyes:  Negative for pain, discharge, redness and itching.   Respiratory:  Positive for cough. Negative for shortness of breath and wheezing.    Cardiovascular:  Negative for chest pain and palpitations.   Gastrointestinal:  Negative for abdominal pain.   Neurological:  Negative for headaches.       Objective   /70 (BP Location: Left arm, Patient Position: Sitting, Cuff Size: Large)   Pulse 75   Temp (!) 96.2 °F (35.7 °C) (Tympanic)   Resp 16   Ht 5' 3\" (1.6 m)   Wt 83.2 kg (183 lb 6.4 oz)   SpO2 98%   BMI 32.49 kg/m²      Physical Exam  Vitals reviewed.   Constitutional:       Appearance: Normal appearance. He is obese.   HENT:      Head: Normocephalic and atraumatic.      Right Ear: Tympanic membrane, ear canal and external ear normal.      Left Ear: Tympanic membrane, ear canal and external ear normal.      Ears:      Comments: Wearing hearing aids     Mouth/Throat:      Mouth: Mucous membranes are moist.   Eyes:      Extraocular Movements: Extraocular movements intact.      Pupils: Pupils are equal, round, and reactive to light.   Cardiovascular:      Rate and Rhythm: Normal rate and regular rhythm. "   Pulmonary:      Effort: Pulmonary effort is normal.      Breath sounds: Rhonchi present.   Neurological:      Mental Status: He is alert and oriented to person, place, and time. Mental status is at baseline.

## 2025-01-11 DIAGNOSIS — I10 ESSENTIAL HYPERTENSION: ICD-10-CM

## 2025-01-13 RX ORDER — OLMESARTAN MEDOXOMIL AND HYDROCHLOROTHIAZIDE 40/12.5 40; 12.5 MG/1; MG/1
1 TABLET ORAL DAILY
Qty: 90 TABLET | Refills: 1 | Status: SHIPPED | OUTPATIENT
Start: 2025-01-13

## 2025-01-20 DIAGNOSIS — F51.01 PRIMARY INSOMNIA: ICD-10-CM

## 2025-01-21 RX ORDER — TRAZODONE HYDROCHLORIDE 50 MG/1
50 TABLET, FILM COATED ORAL
Qty: 30 TABLET | Refills: 5 | Status: SHIPPED | OUTPATIENT
Start: 2025-01-21

## 2025-02-06 ENCOUNTER — RA CDI HCC (OUTPATIENT)
Dept: OTHER | Facility: HOSPITAL | Age: 80
End: 2025-02-06

## 2025-02-06 NOTE — PROGRESS NOTES
HCC coding opportunities          Chart Reviewed number of suggestions sent to Provider: 2     Patients Insurance   E11.22 and E11.36  Medicare Insurance: [employer] Medicare Complete

## 2025-02-12 ENCOUNTER — OFFICE VISIT (OUTPATIENT)
Dept: FAMILY MEDICINE CLINIC | Facility: CLINIC | Age: 80
End: 2025-02-12
Payer: COMMERCIAL

## 2025-02-12 VITALS
TEMPERATURE: 97.8 F | DIASTOLIC BLOOD PRESSURE: 80 MMHG | BODY MASS INDEX: 33.13 KG/M2 | RESPIRATION RATE: 16 BRPM | SYSTOLIC BLOOD PRESSURE: 130 MMHG | WEIGHT: 187 LBS | OXYGEN SATURATION: 98 % | HEART RATE: 76 BPM | HEIGHT: 63 IN

## 2025-02-12 DIAGNOSIS — I10 ESSENTIAL HYPERTENSION: ICD-10-CM

## 2025-02-12 DIAGNOSIS — N18.31 TYPE 2 DIABETES MELLITUS WITH STAGE 3A CHRONIC KIDNEY DISEASE, WITHOUT LONG-TERM CURRENT USE OF INSULIN (HCC): ICD-10-CM

## 2025-02-12 DIAGNOSIS — Z01.810 PREOPERATIVE CARDIOVASCULAR EXAMINATION: Primary | ICD-10-CM

## 2025-02-12 DIAGNOSIS — E11.22 TYPE 2 DIABETES MELLITUS WITH STAGE 3A CHRONIC KIDNEY DISEASE, WITHOUT LONG-TERM CURRENT USE OF INSULIN (HCC): ICD-10-CM

## 2025-02-12 DIAGNOSIS — N18.31 CKD STAGE G3A/A2, GFR 45-59 AND ALBUMIN CREATININE RATIO 30-299 MG/G (HCC): ICD-10-CM

## 2025-02-12 DIAGNOSIS — E66.01 CLASS 2 SEVERE OBESITY DUE TO EXCESS CALORIES WITH SERIOUS COMORBIDITY AND BODY MASS INDEX (BMI) OF 35.0 TO 35.9 IN ADULT (HCC): ICD-10-CM

## 2025-02-12 DIAGNOSIS — E66.812 CLASS 2 SEVERE OBESITY DUE TO EXCESS CALORIES WITH SERIOUS COMORBIDITY AND BODY MASS INDEX (BMI) OF 35.0 TO 35.9 IN ADULT (HCC): ICD-10-CM

## 2025-02-12 PROCEDURE — G2211 COMPLEX E/M VISIT ADD ON: HCPCS | Performed by: FAMILY MEDICINE

## 2025-02-12 PROCEDURE — 99214 OFFICE O/P EST MOD 30 MIN: CPT | Performed by: FAMILY MEDICINE

## 2025-02-12 RX ORDER — METOPROLOL SUCCINATE 25 MG/1
25 TABLET, EXTENDED RELEASE ORAL DAILY
Qty: 90 TABLET | Refills: 3 | Status: SHIPPED | OUTPATIENT
Start: 2025-02-12 | End: 2026-02-07

## 2025-02-12 RX ORDER — TIRZEPATIDE 5 MG/.5ML
INJECTION, SOLUTION SUBCUTANEOUS
COMMUNITY
Start: 2025-01-31 | End: 2025-02-12 | Stop reason: ALTCHOICE

## 2025-02-12 NOTE — PROGRESS NOTES
Name: Neel Verma      : 1945      MRN: [id number]  Encounter Provider: Miles Ruiz MD  Encounter Date: 2025   Encounter department: [facility]    Assessment & Plan  Preoperative cardiovascular examination    Pre-operative Clearance: Cystoscopy low CV risk procedure  - Cleared for cystoscopy scheduled for March 3, 2025  - Hold aspirin 3 days before surgery  - Continue other medications until day before surgery except aspirin     Cardiovascular: history of CABG  - Stable cardiac status  - Continue Metoprolol 25mg daily  - Continue Olmesartan-HCTZ, Statins and aspirin.     Diabetes Mellitus Type 2:  - Well-controlled with recent HbA1C 5.6% (2024)    The patient with progressive worsening in creatinine clearance and GFR of 45, mild albuminuria, and well-controlled diabetes on an ARB agent requires a comprehensive management plan, we will continue reassess and follow after procedure.     Follow-up:  - Return visit in 3 months  - Upcoming cardiology appointment with Dr. Goodrich       Essential hypertension  The patient's blood pressure is well controlled, so they will continue with the same treatment. The patient understands the risks associated with high blood pressure (HTN) and the need for adequate control and adherence to therapy. I explained to the patient that therapeutic measures involve lifelong lifestyle modifications, including: - Sodium reduction to less than 2 g/day - Following the Dietary Approaches to Stop Hypertension (DASH) diet, which includes 3 servings of fruits and vegetables daily, whole grains, low sodium, and low-fat proteins - Weight loss to achieve a BMI under 30 kg/m^2 - Engaging in physical activity: 3 to 5 times per week of daily aerobic exercise for 30- to 50-minute sessions, as tolerated - Avoiding alcohol consumption.     Orders:    CBC and differential; Future    Comprehensive metabolic panel; Future    metoprolol succinate  (TOPROL-XL) 25 mg 24 hr tablet; Take 1 tablet (25 mg total) by mouth daily    Type 2 diabetes mellitus with stage 3a chronic kidney disease, without long-term current use of insulin ([facility])    Lab Results   Component Value Date    HGBA1C 5.6 12/23/2024   Neel is  showing improved control of hyperglycemia, as indicated by the HbA1C percentage mentioned above. He expressed concerns about statin therapy and kidney protection treatments. We discussed our shared treatment goals, which he accepted. The primary objective is to reduce the risk of vascular disease and its complications. I explained the potential complications of uncontrolled diabetes and the symptoms of hypoglycemia. We emphasized the importance of follow-up with diabetes educators and making lifestyle modifications. I encouraged him to remain compliant with his treatment plan and to call if he experiences any side effects. Additionally, I requested that he bring a blood sugar logbook to his next appointment.     Feet exam with Risk Category 2.  Orders:    Ambulatory referral to Podiatry; Future    CKD stage G3a/A2, GFR 45-59 and albumin creatinine ratio  mg/g ([facility])  Lab Results   Component Value Date    EGFR 45 02/13/2025    EGFR 49 06/19/2024    EGFR 48 04/13/2024    CREATININE 1.45 (H) 02/13/2025    CREATININE 1.35 (H) 06/19/2024    CREATININE 1.38 (H) 04/13/2024   The patient with progressive worsening in creatinine clearance and GFR of 45, mild albuminuria, and well-controlled diabetes on an ARB agent requires a comprehensive management plan:     - Monitoring renal function (serum creatinine, GFR) and urine albumin-to-creatinine ratio every 3 months; at target blood pressure aiming for <130/80 mmHg; and check electrolytes, particularly potassium.   - Educated the patient on diabetes management, signs of worsening kidney function, dietary modifications for renal health, and potential ARB side effects.   - Continue on Olmesartan 40 mg, adding  additional renal-protective medications (farxiga).  -Requested to schedule an appointment with a nephrologist for further evaluation and management of chronic kidney disease, while continuing current ARB therapy and SGLT2 inhibitor agent        Orders:    Ambulatory referral to Nephrology; Future    Class 2 severe obesity due to excess calories with serious comorbidity and body mass index (BMI) of 35.0 to 35.9 in adult (HCC)  We discussed the initial approach to the obese patient who desires to lose weight. Patients should diet and exercise, as the NIH Expert Panel recommended in 2017. Combining a reduced-calorie diet and exercise is more productive than either alone. Additional weight loss may be possible with some medication regimens. The initial goal of weight loss therapy (diet and exercise) is a 10% reduction in body weight over six months. After the initial 6-month period, we will reassess to determine the efficacy of the therapy; whether the patient needs to lose more weight, the patient should establish a weight maintenance program.                History of Present Illness     HPI    80-year-old male presenting for pre-operative evaluation for upcoming cystoscopy. Patient reports occasional chest pain with exertion. He mentions concerns about prostate growth continuing after the procedure. Patient is currently taking multiple medications including Metoprolol and Olmesartan. He is scheduled for pre-operative hospital phone screening to review current medications.        Diagnostics:  - EKG shows sinus rhythm with:  * First degree AV block with RBBB  * Left axis deviation  * Inferior infarct  * Lateral leads T wave abnormality  - Changes are stable compared to April 2024 EKG    Labs:  - HbA1C: 5.6% (December 2024)      Review of Systems  Past Medical History:   Diagnosis Date    CAD (coronary artery disease)     Coronary artery disease     Diabetes mellitus (HCC)     Diverticulosis     Hyperglycemia      Hyperlipidemia     Hypertension     Increased frequency of urination 2024    Need for shingles vaccine 2021    Obesity     Thrombocytopenia (HCC) 2016     Past Surgical History:   Procedure Laterality Date    CARDIOVASCULAR STRESS TEST  2013    COLONOSCOPY      2020:diverticulosis    COLONOSCOPY W/ POLYPECTOMY      17:colon,polyp      CORONARY ARTERY BYPASS GRAFT  01/21/2016    x3     Family History   Problem Relation Age of Onset    Hypertension Mother      Social History     Tobacco Use    Smoking status: Former     Current packs/day: 0.00     Average packs/day: 0.2 packs/day for 48.0 years (7.2 ttl pk-yrs)     Types: Cigarettes     Start date:      Quit date: 2012     Years since quittin.1    Smokeless tobacco: Never   Vaping Use    Vaping status: Never Used   Substance and Sexual Activity    Alcohol use: Yes     Comment: social    Drug use: Never    Sexual activity: Yes     Partners: Female     Current Outpatient Medications on File Prior to Visit   Medication Sig    aspirin 81 mg chewable tablet Chew 1 tablet (81 mg total) every 24 hours    Alcohol Swabs (Comfort Touch Alcohol Prep) 70 % PADS TEST 3-4 TIMES A DAY AS DIRECTED    atorvastatin (LIPITOR) 80 mg tablet TAKE 1 TABLET (80 MG TOTAL) BY MOUTH DAILY    Blood Glucose Calibration (OT ULTRA/FASTTK CNTRL SOLN) SOLN Use as directed    Comfort Touch Plus Lancets 30G MISC TEST 3-4 TIMES A DAY AS DIRECTED    ezetimibe (ZETIA) 10 mg tablet TAKE 1 TABLET (10 MG TOTAL) BY MOUTH DAILY    finasteride (PROSCAR) 5 mg tablet Take 5 mg by mouth daily    glucose blood (OneTouch Ultra) test strip TEST 3-4 TIMES A DAY AS DIRECTED    Insulin Pen Needle (Comfort EZ Pen Needles) 32G X 4 MM MISC TEST 3-4 TIMES A DAY AS DIRECTED    metFORMIN (GLUCOPHAGE) 500 mg tablet Take 1 tablet (500 mg total) by mouth daily with breakfast .    olmesartan-hydrochlorothiazide (BENICAR HCT) 40-12.5 MG per tablet TAKE 1 TABLET BY MOUTH DAILY     "semaglutide, 2 mg/dose, (Ozempic, 2 MG/DOSE,) 8 mg/ mL injection pen Inject 2 mg under the skin every 7 days    Silodosin 8 MG CAPS Take 8 mg by mouth daily    traZODone (DESYREL) 50 mg tablet TAKE 1 TABLET (50 MG TOTAL) BY MOUTH DAILY AT BEDTIME     Allergies   Allergen Reactions    No Active Allergies      Immunization History   Administered Date(s) Administered    COVID-19 PFIZER VACCINE 0.3 ML IM 03/11/2021, 04/08/2021, 12/18/2021    H1N1, All Formulations 10/22/2009    INFLUENZA 10/14/2015, 10/14/2015, 11/09/2018, 10/03/2022    Influenza Split 10/30/2013    Influenza Split High Dose Preservative Free IM 10/01/2024    Influenza, high dose seasonal 0.7 mL 11/09/2018, 11/04/2019, 09/25/2020, 09/30/2021, 10/03/2022, 10/25/2023    Influenza, seasonal, injectable 09/21/2011, 09/25/2012, 10/23/2014    Pneumococcal Conjugate 13-Valent 07/29/2019    Pneumococcal Polysaccharide PPV23 02/02/2012, 04/19/2016    Respiratory Syncytial Virus Vaccine (Recombinant, Adjuvanted) 08/08/2024    Tdap 04/19/2016    Zoster 05/14/2012    Zoster Vaccine Recombinant 09/20/2021     Objective   /80 (BP Location: Left arm, Patient Position: Sitting, Cuff Size: Standard)   Pulse 76   Temp 97.8 °F (36.6 °C) (Tympanic)   Resp 16   Ht 5' 3\" (1.6 m)   Wt 84.8 kg (187 lb)   SpO2 98%   BMI 33.13 kg/m²     Physical Exam  Vitals and nursing note reviewed.   Constitutional:       General: He is not in acute distress.     Appearance: He is well-developed. He is obese.   HENT:      Head: Normocephalic and atraumatic.   Eyes:      Conjunctiva/sclera: Conjunctivae normal.   Cardiovascular:      Rate and Rhythm: Normal rate and regular rhythm.      Pulses: no weak pulses.           Dorsalis pedis pulses are 2+ on the right side and 2+ on the left side.        Posterior tibial pulses are 1+ on the right side and 1+ on the left side.      Heart sounds: No murmur heard.  Pulmonary:      Effort: Pulmonary effort is normal. No respiratory " distress.      Breath sounds: Normal breath sounds.   Abdominal:      Palpations: Abdomen is soft.      Tenderness: There is no abdominal tenderness.   Musculoskeletal:         General: No swelling.      Cervical back: Neck supple.        Feet:    Feet:      Right foot:      Skin integrity: Callus and dry skin present.      Left foot:      Skin integrity: Callus and dry skin present.   Skin:     General: Skin is warm and dry.      Capillary Refill: Capillary refill takes less than 2 seconds.   Neurological:      Mental Status: He is alert.   Psychiatric:         Mood and Affect: Mood normal.       Administrative Statements   I have spent a total time of 25 minutes in caring for this patient on the day of the visit/encounter including Diagnostic results and Instructions for management. Topics discussed with the patient / family include medication review.Patient's shoes and socks removed.    Right Foot/Ankle   Right Foot Inspection  Skin Exam: dry skin, callus and callus.     Toe Exam: ROM and strength within normal limits.     Sensory   Monofilament testing: intact    Vascular  Capillary refills: < 3 seconds  The right DP pulse is 2+. The right PT pulse is 1+.     Left Foot/Ankle  Left Foot Inspection  Skin Exam: dry skin and callus.     Toe Exam: ROM and strength within normal limits.     Sensory   Monofilament testing: intact    Vascular  Capillary refills: < 3 seconds  The left DP pulse is 2+. The left PT pulse is 1+.     Assign Risk Category  No deformity present  No loss of protective sensation  No weak pulses  Risk: 2

## 2025-02-12 NOTE — ASSESSMENT & PLAN NOTE
Lab Results   Component Value Date    HGBA1C 5.6 12/23/2024   Neel is  showing improved control of hyperglycemia, as indicated by the HbA1C percentage mentioned above. He expressed concerns about statin therapy and kidney protection treatments. We discussed our shared treatment goals, which he accepted. The primary objective is to reduce the risk of vascular disease and its complications. I explained the potential complications of uncontrolled diabetes and the symptoms of hypoglycemia. We emphasized the importance of follow-up with diabetes educators and making lifestyle modifications. I encouraged him to remain compliant with his treatment plan and to call if he experiences any side effects. Additionally, I requested that he bring a blood sugar logbook to his next appointment.     Feet exam with Risk Category 2.  Orders:    Ambulatory referral to Podiatry; Future

## 2025-02-12 NOTE — ASSESSMENT & PLAN NOTE
Lab Results   Component Value Date    EGFR 45 02/13/2025    EGFR 49 06/19/2024    EGFR 48 04/13/2024    CREATININE 1.45 (H) 02/13/2025    CREATININE 1.35 (H) 06/19/2024    CREATININE 1.38 (H) 04/13/2024   The patient with progressive worsening in creatinine clearance and GFR of 45, mild albuminuria, and well-controlled diabetes on an ARB agent requires a comprehensive management plan:     - Monitoring renal function (serum creatinine, GFR) and urine albumin-to-creatinine ratio every 3 months; at target blood pressure aiming for <130/80 mmHg; and check electrolytes, particularly potassium.   - Educated the patient on diabetes management, signs of worsening kidney function, dietary modifications for renal health, and potential ARB side effects.   - Continue on Olmesartan 40 mg, adding additional renal-protective medications (farxiga).  -Requested to schedule an appointment with a nephrologist for further evaluation and management of chronic kidney disease, while continuing current ARB therapy and SGLT2 inhibitor agent        Orders:    Ambulatory referral to Nephrology; Future

## 2025-02-13 ENCOUNTER — APPOINTMENT (OUTPATIENT)
Dept: LAB | Facility: HOSPITAL | Age: 80
End: 2025-02-13
Payer: COMMERCIAL

## 2025-02-13 DIAGNOSIS — I10 ESSENTIAL HYPERTENSION: ICD-10-CM

## 2025-02-13 LAB
ALBUMIN SERPL BCG-MCNC: 4 G/DL (ref 3.5–5)
ALP SERPL-CCNC: 83 U/L (ref 34–104)
ALT SERPL W P-5'-P-CCNC: 38 U/L (ref 7–52)
ANION GAP SERPL CALCULATED.3IONS-SCNC: 7 MMOL/L (ref 4–13)
AST SERPL W P-5'-P-CCNC: 22 U/L (ref 13–39)
BASOPHILS # BLD AUTO: 0.03 THOUSANDS/ÂΜL (ref 0–0.1)
BASOPHILS NFR BLD AUTO: 0 % (ref 0–1)
BILIRUB SERPL-MCNC: 0.48 MG/DL (ref 0.2–1)
BUN SERPL-MCNC: 23 MG/DL (ref 5–25)
CALCIUM SERPL-MCNC: 9.2 MG/DL (ref 8.4–10.2)
CHLORIDE SERPL-SCNC: 105 MMOL/L (ref 96–108)
CO2 SERPL-SCNC: 30 MMOL/L (ref 21–32)
CREAT SERPL-MCNC: 1.45 MG/DL (ref 0.6–1.3)
EOSINOPHIL # BLD AUTO: 0.23 THOUSAND/ÂΜL (ref 0–0.61)
EOSINOPHIL NFR BLD AUTO: 3 % (ref 0–6)
ERYTHROCYTE [DISTWIDTH] IN BLOOD BY AUTOMATED COUNT: 12.9 % (ref 11.6–15.1)
GFR SERPL CREATININE-BSD FRML MDRD: 45 ML/MIN/1.73SQ M
GLUCOSE P FAST SERPL-MCNC: 95 MG/DL (ref 65–99)
HCT VFR BLD AUTO: 41.1 % (ref 36.5–49.3)
HGB BLD-MCNC: 13.6 G/DL (ref 12–17)
IMM GRANULOCYTES # BLD AUTO: 0.02 THOUSAND/UL (ref 0–0.2)
IMM GRANULOCYTES NFR BLD AUTO: 0 % (ref 0–2)
LYMPHOCYTES # BLD AUTO: 0.96 THOUSANDS/ÂΜL (ref 0.6–4.47)
LYMPHOCYTES NFR BLD AUTO: 14 % (ref 14–44)
MCH RBC QN AUTO: 30.8 PG (ref 26.8–34.3)
MCHC RBC AUTO-ENTMCNC: 33.1 G/DL (ref 31.4–37.4)
MCV RBC AUTO: 93 FL (ref 82–98)
MONOCYTES # BLD AUTO: 0.8 THOUSAND/ÂΜL (ref 0.17–1.22)
MONOCYTES NFR BLD AUTO: 11 % (ref 4–12)
NEUTROPHILS # BLD AUTO: 4.95 THOUSANDS/ÂΜL (ref 1.85–7.62)
NEUTS SEG NFR BLD AUTO: 72 % (ref 43–75)
NRBC BLD AUTO-RTO: 0 /100 WBCS
PLATELET # BLD AUTO: 173 THOUSANDS/UL (ref 149–390)
PMV BLD AUTO: 10.9 FL (ref 8.9–12.7)
POTASSIUM SERPL-SCNC: 4.5 MMOL/L (ref 3.5–5.3)
PROT SERPL-MCNC: 6.7 G/DL (ref 6.4–8.4)
RBC # BLD AUTO: 4.42 MILLION/UL (ref 3.88–5.62)
SODIUM SERPL-SCNC: 142 MMOL/L (ref 135–147)
WBC # BLD AUTO: 6.99 THOUSAND/UL (ref 4.31–10.16)

## 2025-02-13 PROCEDURE — 80053 COMPREHEN METABOLIC PANEL: CPT

## 2025-02-13 PROCEDURE — 85025 COMPLETE CBC W/AUTO DIFF WBC: CPT

## 2025-02-13 PROCEDURE — 36415 COLL VENOUS BLD VENIPUNCTURE: CPT

## 2025-02-14 ENCOUNTER — TELEPHONE (OUTPATIENT)
Dept: FAMILY MEDICINE CLINIC | Facility: CLINIC | Age: 80
End: 2025-02-14

## 2025-02-14 ENCOUNTER — RESULTS FOLLOW-UP (OUTPATIENT)
Dept: FAMILY MEDICINE CLINIC | Facility: CLINIC | Age: 80
End: 2025-02-14

## 2025-02-14 PROBLEM — E66.01 CLASS 2 SEVERE OBESITY DUE TO EXCESS CALORIES WITH SERIOUS COMORBIDITY AND BODY MASS INDEX (BMI) OF 35.0 TO 35.9 IN ADULT (HCC): Status: ACTIVE | Noted: 2025-02-14

## 2025-02-14 PROBLEM — E66.812 CLASS 2 SEVERE OBESITY DUE TO EXCESS CALORIES WITH SERIOUS COMORBIDITY AND BODY MASS INDEX (BMI) OF 35.0 TO 35.9 IN ADULT (HCC): Status: ACTIVE | Noted: 2025-02-14

## 2025-02-14 NOTE — TELEPHONE ENCOUNTER
Pt was called inform the two referrals the need to call and schedule appt . Pt states the come to  the orders.    ----- Message from Miles Ruiz MD sent at 2/14/2025  8:40 AM EST -----  Regarding: entered referrals after visit.  Please call patient. I am referring him to Nephrologist for decreased in kidney filtration and podiatrist for a routine feet exam. Please give the contact information or ask patient to pick both referrals.

## 2025-03-04 DIAGNOSIS — E11.65 TYPE 2 DIABETES MELLITUS WITH HYPERGLYCEMIA, WITHOUT LONG-TERM CURRENT USE OF INSULIN (HCC): ICD-10-CM

## 2025-03-05 RX ORDER — BLOOD SUGAR DIAGNOSTIC
STRIP MISCELLANEOUS
Qty: 100 EACH | Refills: 5 | Status: SHIPPED | OUTPATIENT
Start: 2025-03-05

## 2025-03-06 RX ORDER — BLOOD SUGAR DIAGNOSTIC
STRIP MISCELLANEOUS
Qty: 100 EACH | Refills: 5 | OUTPATIENT
Start: 2025-03-06

## 2025-03-13 DIAGNOSIS — E11.65 TYPE 2 DIABETES MELLITUS WITH HYPERGLYCEMIA, WITHOUT LONG-TERM CURRENT USE OF INSULIN (HCC): ICD-10-CM

## 2025-03-15 NOTE — ASSESSMENT & PLAN NOTE
No recent lipid profile  10/27/2022 lipid profile: Cholesterol 84, triglycerides 97, HDL 29, LDL calculated 36, non-HDL cholesterol 55.    On atorvastatin 80 mg daily  On ezetimibe 10 mg daily

## 2025-03-15 NOTE — ASSESSMENT & PLAN NOTE
Lab Results   Component Value Date    EGFR 48 (L) 02/20/2025    EGFR 45 02/13/2025    EGFR 49 06/19/2024    CREATININE 1.48 (H) 02/20/2025    CREATININE 1.45 (H) 02/13/2025    CREATININE 1.35 (H) 06/19/2024

## 2025-03-15 NOTE — ASSESSMENT & PLAN NOTE
Patient was 1st seen in our office on March 5, 2013 for midsternal chest discomfort and dyspnea on exertion. 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 Other diagnoses include hypertension, metabolic syndrome, hyperlipidemia. Cardiac catheterization demonstrated a 70-80% left anterior after the 2nd diagonal branch. Circumflex artery minor luminal irregularities. Right coronary artery dominant in distribution with a proximal 90% lesion. Patient underwent coronary artery bypass surgery with a LIMA to the LAD, SVG to the right coronary artery, SVG to the OM branch of the circumflex.    10/04/2018 echocardiogram:  Normal left ventricular systolic function EF 62%.  Grade 3 diastolic left ventricular dysfunction.  Moderate concentric left ventricular hypertrophy.  Biatrial enlargement.  Aortic sclerosis.    Continue aspirin 81 mg daily  Continue metoprolol succinate 25 mg daily

## 2025-03-15 NOTE — ASSESSMENT & PLAN NOTE
11/12/2024 110/70  12/23/2024 122/70  2/12/2025 130/80    Olmesartan hydrochlorothiazide 40-12.5 mg 1 daily  Metoprolol succinate 25 mg daily

## 2025-03-16 DIAGNOSIS — R80.9 TYPE 2 DIABETES MELLITUS WITH DIABETIC MICROALBUMINURIA, WITHOUT LONG-TERM CURRENT USE OF INSULIN (HCC): Primary | ICD-10-CM

## 2025-03-16 DIAGNOSIS — E11.29 TYPE 2 DIABETES MELLITUS WITH DIABETIC MICROALBUMINURIA, WITHOUT LONG-TERM CURRENT USE OF INSULIN (HCC): Primary | ICD-10-CM

## 2025-03-16 RX ORDER — TIRZEPATIDE 5 MG/.5ML
INJECTION, SOLUTION SUBCUTANEOUS
Qty: 2 ML | Refills: 1 | OUTPATIENT
Start: 2025-03-16

## 2025-03-18 ENCOUNTER — OFFICE VISIT (OUTPATIENT)
Dept: CARDIOLOGY CLINIC | Facility: CLINIC | Age: 80
End: 2025-03-18
Payer: COMMERCIAL

## 2025-03-18 ENCOUNTER — TELEPHONE (OUTPATIENT)
Dept: CARDIOLOGY CLINIC | Facility: CLINIC | Age: 80
End: 2025-03-18

## 2025-03-18 VITALS
SYSTOLIC BLOOD PRESSURE: 138 MMHG | BODY MASS INDEX: 33.17 KG/M2 | HEIGHT: 63 IN | WEIGHT: 187.2 LBS | DIASTOLIC BLOOD PRESSURE: 80 MMHG | HEART RATE: 54 BPM

## 2025-03-18 DIAGNOSIS — E78.00 PURE HYPERCHOLESTEROLEMIA: ICD-10-CM

## 2025-03-18 DIAGNOSIS — E11.9 TYPE 2 DIABETES MELLITUS WITHOUT COMPLICATION, WITHOUT LONG-TERM CURRENT USE OF INSULIN (HCC): ICD-10-CM

## 2025-03-18 DIAGNOSIS — N18.31 CKD STAGE G3A/A2, GFR 45-59 AND ALBUMIN CREATININE RATIO 30-299 MG/G (HCC): ICD-10-CM

## 2025-03-18 DIAGNOSIS — I25.810 CORONARY ARTERY DISEASE INVOLVING CORONARY BYPASS GRAFT OF NATIVE HEART WITHOUT ANGINA PECTORIS: Primary | ICD-10-CM

## 2025-03-18 DIAGNOSIS — E66.811 CLASS 1 OBESITY DUE TO EXCESS CALORIES WITH SERIOUS COMORBIDITY AND BODY MASS INDEX (BMI) OF 32.0 TO 32.9 IN ADULT: ICD-10-CM

## 2025-03-18 DIAGNOSIS — I10 PRIMARY HYPERTENSION: ICD-10-CM

## 2025-03-18 DIAGNOSIS — E66.09 CLASS 1 OBESITY DUE TO EXCESS CALORIES WITH SERIOUS COMORBIDITY AND BODY MASS INDEX (BMI) OF 32.0 TO 32.9 IN ADULT: ICD-10-CM

## 2025-03-18 PROCEDURE — 99214 OFFICE O/P EST MOD 30 MIN: CPT | Performed by: INTERNAL MEDICINE

## 2025-03-18 PROCEDURE — 93000 ELECTROCARDIOGRAM COMPLETE: CPT | Performed by: INTERNAL MEDICINE

## 2025-03-18 RX ORDER — ATORVASTATIN CALCIUM 40 MG/1
40 TABLET, FILM COATED ORAL DAILY
Qty: 90 TABLET | Refills: 3 | Status: SHIPPED | OUTPATIENT
Start: 2025-03-18

## 2025-03-18 NOTE — PATIENT INSTRUCTIONS
Reducing your atorvastatin's dose from 80 mg to 40 mg.  When you get the 40 mg pills, throw the 80 mg pills out.  Do not take both 80 mg and 40 mg tablets since that higher dose could cause serious side effects.

## 2025-03-18 NOTE — PROGRESS NOTES
[facility]  [address] [place] [address]  Phone#  [phone]   Fax#  [phone]  *-*-*-*-*-*-*-*-*-*-*-*-*-*-*-*-*-*-*-*-*-*-*-*-*-*-*-*-*-*-*-*-*-*-*-*-*-*-*-*-*-*-*-*-*-*-*-*-*-*-*-*-*-*                                   Cardiology Follow Up      ENCOUNTER DATE: 25 10:43 AM  PATIENT NAME: Neel Verma   : 1945    MRN: [id number]  AGE:80 y.o.      SEX: male  ENCOUNTER PROVIDER:Jam Goodrich MD     PRIMARY CARE PHYSICIAN: Miles Ruiz MD    ACTIVE DIAGNOSIS AND PLAN    1. Coronary artery disease involving coronary bypass graft of native heart without angina pectoris  Assessment & Plan:  Patient was 1st seen in our office on 2013 for midsternal chest discomfort and dyspnea on exertion. 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 Other diagnoses include hypertension, metabolic syndrome, hyperlipidemia. Cardiac catheterization demonstrated a 70-80% left anterior after the 2nd diagonal branch. Circumflex artery minor luminal irregularities. Right coronary artery dominant in distribution with a proximal 90% lesion. Patient underwent coronary artery bypass surgery with a LIMA to the LAD, SVG to the right coronary artery, SVG to the OM branch of the circumflex.    10/04/2018 echocardiogram:  Normal left ventricular systolic function EF 62%.  Grade 3 diastolic left ventricular dysfunction.  Moderate concentric left ventricular hypertrophy.  Biatrial enlargement.  Aortic sclerosis.    Continue aspirin 81 mg daily  Continue metoprolol succinate 25 mg daily  Orders:  -     POCT ECG  2. Pure hypercholesterolemia  Assessment & Plan:  No recent lipid profile  10/27/2022 lipid profile: Cholesterol 84, triglycerides 97, HDL 29, LDL calculated 36, non-HDL cholesterol 55.    On atorvastatin 80 mg daily  On ezetimibe 10 mg daily    Orders:  -     atorvastatin (LIPITOR) 40 mg tablet; Take 1 tablet (40 mg  total) by mouth daily  3. Primary hypertension  Assessment & Plan:  11/12/2024 110/70  12/23/2024 122/70  2/12/2025 130/80    Olmesartan hydrochlorothiazide 40-12.5 mg 1 daily  Metoprolol succinate 25 mg daily  4. CKD stage G3a/A2, GFR 45-59 and albumin creatinine ratio  mg/g ([facility])  Assessment & Plan:  Lab Results   Component Value Date    EGFR 48 (L) 02/20/2025    EGFR 45 02/13/2025    EGFR 49 06/19/2024    CREATININE 1.48 (H) 02/20/2025    CREATININE 1.45 (H) 02/13/2025    CREATININE 1.35 (H) 06/19/2024     5. Class 1 obesity due to excess calories with serious comorbidity and body mass index (BMI) of 32.0 to 32.9 in adult  Assessment & Plan:  BMI 33.1  On Ozempic 2 mg every 7 days  6. Type 2 diabetes mellitus without complication, without long-term current use of insulin ([facility])  Assessment & Plan:    Lab Results   Component Value Date    HGBA1C 5.6 12/23/2024     Defer management to PCP     INTERVAL HISTORY:        He denies chest discomfort or shortness of breath.  He has no palpitations.  He denies symptoms of dizziness, lightheadedness or near-syncope/syncope.  He denies leg edema.  He denies symptoms of orthopnea or paroxysmal nocturnal dyspnea.    He is losing weight on Ozempic with marked improvement in his diabetic control.  His blood pressure is acceptable at 140/80. His last lipid profile in February 2024 demonstrated an excellent cholesterol with triglycerides of 63 and LDL of 34.    DISCUSSION/PLAN:          Continue medications as noted above.  Return in 6 months.    CARDIOLOGY HISTORY AND TESTING  Patient was 1st seen in our office on March 5, 2013 for midsternal chest discomfort and dyspnea on exertion. 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 Other diagnoses include hypertension, metabolic syndrome, hyperlipidemia. Cardiac catheterization demonstrated a 70-80% left anterior after the 2nd diagonal branch. Circumflex artery minor  luminal irregularities. Right coronary artery dominant in distribution with a proximal 90% lesion. Patient underwent coronary artery bypass surgery with a LIMA to the LAD, SVG to the right coronary artery, SVG to the OM branch of the circumflex.    03/19/2013 echocardiogram: Normal left ventricular systolic and diastolic function. Mildly elevated left ventricular filling pressures. Aortic sclerosis with mild aortic regurgitation. Moderately severe concentric left ventricular hypertrophy.    10/04/2018 echocardiogram:  Normal left ventricular systolic function EF 62%.  Grade 3 diastolic left ventricular dysfunction.  Moderate concentric left ventricular hypertrophy.  Biatrial enlargement.  Aortic sclerosis.    ACTIVE PROBLEM LIST  Patient Active Problem List   Diagnosis    CAD (coronary artery disease)    Diverticulosis    Hypertension    Hyperlipidemia    Old myocardial infarction    S/P CABG x 3  2016    Type 2 diabetes mellitus without complication, without long-term current use of insulin ([facility])    CKD stage G3a/A2, GFR 45-59 and albumin creatinine ratio  mg/g ([facility])    Class 1 obesity due to excess calories with body mass index (BMI) of 32.0 to 32.9 in adult    Nocturia    Mixed conductive and sensorineural hearing loss of both ears    Elevated PSA    Benign prostatic hyperplasia with lower urinary tract symptoms    Class 2 severe obesity due to excess calories with serious comorbidity and body mass index (BMI) of 35.0 to 35.9 in adult ([facility])       TODAY'S EKG  Results for orders placed or performed in visit on 03/18/25   POCT ECG    Narrative    Sinus bradycardia at a rate of 54 bpm.  First-degree AV block.  Old inferior wall myocardial infarction.  Abnormal EKG.         Lab Studies:    Lab Results   Component Value Date    CHOLESTEROL 79 02/27/2024    CHOLESTEROL 87 02/27/2023    CHOLESTEROL 84 10/27/2022     Lab Results   Component Value Date    TRIG 63 02/27/2024    TRIG 87 02/27/2023    TRIG 97  "10/27/2022     Lab Results   Component Value Date    HDL 32 (L) 02/27/2024    HDL 27 (L) 02/27/2023    HDL 29 (L) 10/27/2022     Lab Results   Component Value Date    LDLCALC 34 02/27/2024    LDLCALC 43 02/27/2023    LDLCALC 36 10/27/2022     No results found for: \"NONHDL\"  No results found for: \"LDLDIRECT\"      Lab Results   Component Value Date    HGBA1C 5.6 12/23/2024    HGBA1C 6.0 (H) 06/19/2024    HGBA1C 6.8 (H) 02/27/2024      Lab Results   Component Value Date    EGFR 48 (L) 02/20/2025    EGFR 45 02/13/2025    EGFR 49 06/19/2024    SODIUM 143 02/20/2025    SODIUM 142 02/13/2025    SODIUM 142 06/19/2024    K 4.3 02/20/2025    K 4.5 02/13/2025    K 4.6 06/19/2024     02/20/2025     02/13/2025     06/19/2024    CO2 29 02/20/2025    CO2 30 02/13/2025    CO2 27 06/19/2024    ANIONGAP 11 04/27/2018    BUN 20 02/20/2025    BUN 23 02/13/2025    BUN 20 06/19/2024    CREATININE 1.48 (H) 02/20/2025    CREATININE 1.45 (H) 02/13/2025    CREATININE 1.35 (H) 06/19/2024    MG 2.0 11/12/2018     Lab Results   Component Value Date    WBC 6.99 02/13/2025    WBC 7.56 04/08/2024    WBC 6.39 02/27/2024    HGB 13.6 02/13/2025    HGB 14.8 04/08/2024    HGB 14.4 02/27/2024    HCT 41.1 02/13/2025    HCT 44.3 04/08/2024    HCT 44.0 02/27/2024    MCV 93 02/13/2025    MCV 91 04/08/2024    MCV 93 02/27/2024    MCH 30.8 02/13/2025    MCH 30.5 04/08/2024    MCH 30.5 02/27/2024    MCHC 33.1 02/13/2025    MCHC 33.4 04/08/2024    MCHC 32.7 02/27/2024     02/13/2025     04/08/2024     02/27/2024      Lab Results   Component Value Date    GLUCOSE 90 04/27/2018    CALCIUM 8.8 02/20/2025    CALCIUM 9.2 02/13/2025    CALCIUM 9.4 06/19/2024    ALB 4.0 02/13/2025    ALB 4.1 06/19/2024    ALB 4.0 04/08/2024    TP 6.7 02/13/2025    TP 6.6 06/19/2024    TP 6.9 04/08/2024    AST 22 02/13/2025    AST 30 06/19/2024    AST 22 04/08/2024    ALT 38 02/13/2025    ALT 52 06/19/2024    ALT 41 04/08/2024    ALKPHOS 83 " 02/13/2025    ALKPHOS 71 06/19/2024    ALKPHOS 83 04/08/2024    BILITOT 0.8 04/27/2018       Lab Results   Component Value Date     (H) 02/27/2024           Current Outpatient Medications:     Alcohol Swabs (Comfort Touch Alcohol Prep) 70 % PADS, TEST 3-4 TIMES A DAY AS DIRECTED, Disp: 100 each, Rfl: 5    aspirin 81 mg chewable tablet, Chew 1 tablet (81 mg total) every 24 hours, Disp: 90 tablet, Rfl: 3    atorvastatin (LIPITOR) 40 mg tablet, Take 1 tablet (40 mg total) by mouth daily, Disp: 90 tablet, Rfl: 3    Blood Glucose Calibration (OT ULTRA/FASTTK CNTRL SOLN) SOLN, Use as directed, Disp: , Rfl:     Comfort Touch Plus Lancets 30G MISC, TEST 3-4 TIMES A DAY AS DIRECTED, Disp: 300 each, Rfl: 3    ezetimibe (ZETIA) 10 mg tablet, TAKE 1 TABLET (10 MG TOTAL) BY MOUTH DAILY, Disp: 90 tablet, Rfl: 3    finasteride (PROSCAR) 5 mg tablet, Take 5 mg by mouth daily, Disp: , Rfl:     Insulin Pen Needle (Comfort EZ Pen Needles) 32G X 4 MM MISC, TEST 3-4 TIMES A DAY AS DIRECTED, Disp: 300 each, Rfl: 0    metFORMIN (GLUCOPHAGE) 500 mg tablet, Take 1 tablet (500 mg total) by mouth daily with breakfast ., Disp: 90 tablet, Rfl: 1    metoprolol succinate (TOPROL-XL) 25 mg 24 hr tablet, Take 1 tablet (25 mg total) by mouth daily, Disp: 90 tablet, Rfl: 3    olmesartan-hydrochlorothiazide (BENICAR HCT) 40-12.5 MG per tablet, TAKE 1 TABLET BY MOUTH DAILY, Disp: 90 tablet, Rfl: 1    OneTouch Ultra test strip, TEST 3-4 TIMES A DAY AS DIRECTED, Disp: 100 each, Rfl: 5    semaglutide, 2 mg/dose, (Ozempic, 2 MG/DOSE,) 8 mg/ mL injection pen, Inject 2 mg under the skin every 7 days, Disp: , Rfl:     Silodosin 8 MG CAPS, Take 8 mg by mouth daily, Disp: , Rfl:     traZODone (DESYREL) 50 mg tablet, TAKE 1 TABLET (50 MG TOTAL) BY MOUTH DAILY AT BEDTIME, Disp: 30 tablet, Rfl: 5  Allergies   Allergen Reactions    No Active Allergies        Past Medical History:   Diagnosis Date    CAD (coronary artery disease)     Coronary artery disease      Diabetes mellitus (HCC)     Diverticulosis     Hyperglycemia     Hyperlipidemia     Hypertension     Increased frequency of urination 2024    Need for shingles vaccine 2021    Obesity     Thrombocytopenia (HCC) 2016     Social History     Socioeconomic History    Marital status: /Civil Union     Spouse name: Not on file    Number of children: Not on file    Years of education: Not on file    Highest education level: Not on file   Occupational History    Not on file   Tobacco Use    Smoking status: Former     Current packs/day: 0.00     Average packs/day: 0.2 packs/day for 48.0 years (7.2 ttl pk-yrs)     Types: Cigarettes     Start date:      Quit date: 2012     Years since quittin.2    Smokeless tobacco: Never   Vaping Use    Vaping status: Never Used   Substance and Sexual Activity    Alcohol use: Yes     Comment: social    Drug use: Never    Sexual activity: Yes     Partners: Female   Other Topics Concern    Not on file   Social History Narrative    Not on file     Social Drivers of Health     Financial Resource Strain: Not At Risk (3/3/2025)    Received from [facility]    Financial Insecurity     In the last 12 months did you skip medications to save money?: No     In the last 12 months was there a time when you needed to see a doctor but could not because of cost?: No   Food Insecurity: No Food Insecurity (3/3/2025)    Received from [facility]    Food Insecurity     In the last 12 months did you ever eat less than you felt you should because there wasn't enough money for food?: No   Transportation Needs: No Transportation Needs (3/3/2025)    Received from [facility]    Transportation Needs     In the last 12 months have you ever had to go without healthcare because you didn't have a way to get there?: No   Physical Activity: Not on file   Stress: Not on file   Social Connections: Socially Integrated (3/3/2025)     "Received from [facility]    Social Connection     Do you often feel lonely?: No   Intimate Partner Violence: Not on file   Housing Stability: Not At Risk (3/3/2025)    Received from [facility]    Housing Stability     Are you worried that in the next 2 months you may not have stable housing?: No      Family History   Problem Relation Age of Onset    Hypertension Mother      Past Surgical History:   Procedure Laterality Date    CARDIOVASCULAR STRESS TEST  03/20/2013    COLONOSCOPY      4/8/2020:diverticulosis    COLONOSCOPY W/ POLYPECTOMY      2/6/17:colon,polyp      CORONARY ARTERY BYPASS GRAFT  01/21/2016    x3       PREVIOUS WEIGHTS:   Wt Readings from Last 10 Encounters:   03/18/25 84.9 kg (187 lb 3.2 oz)   02/12/25 84.8 kg (187 lb)   12/23/24 83.2 kg (183 lb 6.4 oz)   11/12/24 84.4 kg (186 lb)   08/29/24 87.5 kg (193 lb)   08/15/24 86.9 kg (191 lb 9.6 oz)   08/01/24 86.5 kg (190 lb 9.6 oz)   06/19/24 88.5 kg (195 lb)   05/01/24 88.5 kg (195 lb)   04/12/24 90.7 kg (200 lb)        Review of Systems:  Review of Systems   Constitutional:  Negative for activity change.   Respiratory:  Negative for cough, chest tightness, shortness of breath and wheezing.    Cardiovascular:  Negative for chest pain, palpitations and leg swelling.   Musculoskeletal:  Negative for gait problem.   Skin:  Negative for color change.   Neurological:  Negative for dizziness, tremors, syncope, weakness, light-headedness and headaches.   Psychiatric/Behavioral:  Negative for agitation and confusion.        Physical Exam:  /80 (BP Location: Right arm, Patient Position: Sitting, Cuff Size: Adult)   Pulse (!) 54   Ht 5' 3\" (1.6 m)   Wt 84.9 kg (187 lb 3.2 oz)   BMI 33.16 kg/m²     Physical Exam  Vitals reviewed.   Constitutional:       General: He is not in acute distress.     Appearance: He is well-developed.   HENT:      Head: Normocephalic and atraumatic.   Neck:      Thyroid: No thyromegaly.      " "Vascular: No carotid bruit or JVD.      Trachea: No tracheal deviation.   Cardiovascular:      Rate and Rhythm: Normal rate and regular rhythm.      Pulses: Normal pulses.      Heart sounds: Normal heart sounds. No murmur heard.     No friction rub. No gallop.   Pulmonary:      Effort: Pulmonary effort is normal. No respiratory distress.      Breath sounds: Normal breath sounds. No wheezing, rhonchi or rales.   Chest:      Chest wall: No tenderness.   Musculoskeletal:      Cervical back: Normal range of motion and neck supple.      Right lower leg: No edema.      Left lower leg: No edema.   Skin:     General: Skin is warm and dry.   Neurological:      General: No focal deficit present.      Mental Status: He is alert and oriented to person, place, and time.   Psychiatric:         Mood and Affect: Mood normal.         Behavior: Behavior normal.         Thought Content: Thought content normal.         Judgment: Judgment normal.       ======================================================  Imaging:   Results Review Statement: No pertinent imaging studies reviewed.    Portions of the record may have been created with voice recognition software. Occasional wrong word or \"sound a like\" substitutions may have occurred due to the inherent limitations of voice recognition software. Read the chart carefully and recognize, using context, where substitutions have occurred.    SIGNATURES:   Jam Goodrich MD   "

## 2025-03-30 DIAGNOSIS — E11.65 TYPE 2 DIABETES MELLITUS WITH HYPERGLYCEMIA, WITHOUT LONG-TERM CURRENT USE OF INSULIN (HCC): ICD-10-CM

## 2025-03-31 RX ORDER — LANCETS 30 GAUGE
EACH MISCELLANEOUS
Qty: 300 EACH | Refills: 3 | Status: SHIPPED | OUTPATIENT
Start: 2025-03-31

## 2025-04-02 DIAGNOSIS — E11.65 TYPE 2 DIABETES MELLITUS WITH HYPERGLYCEMIA, WITHOUT LONG-TERM CURRENT USE OF INSULIN (HCC): ICD-10-CM

## 2025-04-02 DIAGNOSIS — I10 ESSENTIAL HYPERTENSION: ICD-10-CM

## 2025-04-03 RX ORDER — BLOOD SUGAR DIAGNOSTIC
STRIP MISCELLANEOUS
Qty: 100 EACH | Refills: 5 | Status: SHIPPED | OUTPATIENT
Start: 2025-04-03

## 2025-04-03 RX ORDER — ATORVASTATIN CALCIUM 80 MG/1
80 TABLET, FILM COATED ORAL DAILY
Qty: 90 TABLET | Refills: 3 | OUTPATIENT
Start: 2025-04-03

## 2025-04-24 DIAGNOSIS — E78.00 PURE HYPERCHOLESTEROLEMIA: ICD-10-CM

## 2025-04-25 RX ORDER — EZETIMIBE 10 MG/1
10 TABLET ORAL DAILY
Qty: 90 TABLET | Refills: 0 | Status: SHIPPED | OUTPATIENT
Start: 2025-04-25

## 2025-04-26 DIAGNOSIS — F51.01 PRIMARY INSOMNIA: ICD-10-CM

## 2025-04-28 RX ORDER — TRAZODONE HYDROCHLORIDE 50 MG/1
50 TABLET ORAL
Qty: 30 TABLET | Refills: 5 | Status: SHIPPED | OUTPATIENT
Start: 2025-04-28

## 2025-04-29 ENCOUNTER — CONSULT (OUTPATIENT)
Dept: NEPHROLOGY | Facility: CLINIC | Age: 80
End: 2025-04-29
Payer: COMMERCIAL

## 2025-04-29 ENCOUNTER — TELEPHONE (OUTPATIENT)
Dept: NEPHROLOGY | Facility: CLINIC | Age: 80
End: 2025-04-29

## 2025-04-29 VITALS
BODY MASS INDEX: 31.89 KG/M2 | WEIGHT: 180 LBS | SYSTOLIC BLOOD PRESSURE: 140 MMHG | HEART RATE: 72 BPM | DIASTOLIC BLOOD PRESSURE: 80 MMHG | OXYGEN SATURATION: 96 %

## 2025-04-29 DIAGNOSIS — N18.31 HYPERTENSIVE KIDNEY DISEASE WITH STAGE 3A CHRONIC KIDNEY DISEASE (HCC): Primary | ICD-10-CM

## 2025-04-29 DIAGNOSIS — Z95.1 S/P CABG X 3: ICD-10-CM

## 2025-04-29 DIAGNOSIS — I12.9 HYPERTENSIVE KIDNEY DISEASE WITH STAGE 3A CHRONIC KIDNEY DISEASE (HCC): Primary | ICD-10-CM

## 2025-04-29 DIAGNOSIS — E11.9 TYPE 2 DIABETES MELLITUS WITHOUT COMPLICATION, WITHOUT LONG-TERM CURRENT USE OF INSULIN (HCC): ICD-10-CM

## 2025-04-29 DIAGNOSIS — N28.1 RENAL CYST: ICD-10-CM

## 2025-04-29 DIAGNOSIS — N40.1 BENIGN PROSTATIC HYPERPLASIA WITH LOWER URINARY TRACT SYMPTOMS, SYMPTOM DETAILS UNSPECIFIED: ICD-10-CM

## 2025-04-29 DIAGNOSIS — E78.00 PURE HYPERCHOLESTEROLEMIA: ICD-10-CM

## 2025-04-29 DIAGNOSIS — N18.31 CKD STAGE G3A/A2, GFR 45-59 AND ALBUMIN CREATININE RATIO 30-299 MG/G (HCC): ICD-10-CM

## 2025-04-29 DIAGNOSIS — I25.810 CORONARY ARTERY DISEASE INVOLVING CORONARY BYPASS GRAFT OF NATIVE HEART WITHOUT ANGINA PECTORIS: ICD-10-CM

## 2025-04-29 PROBLEM — E66.09 OBESITY DUE TO EXCESS CALORIES WITH SERIOUS COMORBIDITY: Status: ACTIVE | Noted: 2025-02-14

## 2025-04-29 PROCEDURE — 99204 OFFICE O/P NEW MOD 45 MIN: CPT | Performed by: INTERNAL MEDICINE

## 2025-04-29 NOTE — PROGRESS NOTES
OFFICE CONSULT - Nephrology   Neel Verma 80 y.o. male MRN: [id number]        ASSESSMENT and PLAN:  Assessment & Plan  Hypertensive kidney disease with stage 3a chronic kidney disease (HCC)  Lab Results   Component Value Date    EGFR 48 (L) 02/20/2025    EGFR 45 02/13/2025    EGFR 49 06/19/2024    CREATININE 1.48 (H) 02/20/2025    CREATININE 1.45 (H) 02/13/2025    CREATININE 1.35 (H) 06/19/2024   CKD stage IIIa/B with creatinine fluctuating around 1.3-1.5 back since 2019.  CKD suspected secondary to hypertensive nephrosclerosis, patient reports history of hypertension for more than 20 years, also age-related nephron loss, noted diabetes as per patient's wife only for 3 years.  Renal function overall is stable.  Patient is on ARB  Plan to follow up with repeat labs in 6 months  Avoid NSAIDs.  Blood pressure acceptable.  No microalbuminuria  CKD stage G3a/A2, GFR 45-59 and albumin creatinine ratio  mg/g ([facility])  Lab Results   Component Value Date    EGFR 48 (L) 02/20/2025    EGFR 45 02/13/2025    EGFR 49 06/19/2024    CREATININE 1.48 (H) 02/20/2025    CREATININE 1.45 (H) 02/13/2025    CREATININE 1.35 (H) 06/19/2024   Creatinine around 1.3-1.5 since 2019.  Overall kidney function is stable.  Follow-up in 6 months as above  Coronary artery disease involving coronary bypass graft of native heart without angina pectoris  CAD status post CABG 2016.  Continue follow-up with cardiology  S/P CABG x 3  2016    Pure hypercholesterolemia  On Zetia and Lipitor.  Lipid panel well-controlled on 2/2024    Benign prostatic hyperplasia with lower urinary tract symptoms, symptom details unspecified  BPH status post laser enucleation of the prostate on 3/3/2025 by urology at [facility]  Type 2 diabetes mellitus without complication, without long-term current use of insulin ([facility])    Lab Results   Component Value Date    HGBA1C 5.6 12/23/2024   Well-controlled.  Management as per primary care doctor  Renal cyst  Bilateral  simple renal cyst based on most recent ultrasound      80-year-old gentleman who was referred by his primary care doctor for evaluation of CKD, patient presents to the office with his wife.  Patient reports history of hypertension for more than 20 years, CAD status post CABG 9 years ago, hyperlipidemia, diabetes as per patient's wife for 3 years, BPH status post recent prostate surgery last month at [facility].      Patient Instructions   As we discussed in the office visit you were seen today for evaluation of chronic kidney disease stage III.  Suspected secondary to long-term history of hypertension for more than 20 years, atherosclerotic disease, age-related nephron loss.  I would like to see you back in the office in 6 months with repeat labs.  Your kidney function remained fairly stable for several years.  Remember to follow low-salt diet.  Remember to have good diabetes control.  Okay to take Tylenol or acetaminophen as needed for pain.  Avoid NSAIDs (no ibuprofen, Motrin, Advil, Aleve, naproxen).  Continue close follow-up with primary care doctor, cardiologist, urologist.        HPI:  Neel Verma is a 80 y.o.male who was referred by  for evaluation of Consult and Chronic Kidney Disease  .    Patient with hypertension for more than 20 years, CKD stage IIIa/b creatinine fluctuating around 1.3-1.5 since 2019, CAD status post CABG, hyperlipidemia, BPH, diabetes for 3 years, who was referred to our office for evaluation of CKD.  Patient presents to the office with his wife.    Patient in general is doing okay other than some urinary incontinence since prostate surgery last month.    Denies any chest pain, no shortness of breath, no leg swelling.  Denies any abdominal pain, no recent nausea, no vomiting, no diarrhea or constipation.  Denies any dysuria or gross hematuria.  Denies NSAID use.  Denies tobacco abuse.  Denies family history of kidney disease    I personally spent over half of a  total 55 minutes face to face with the patient in counseling and discussion and/or coordination of care as described above.    ROS: All the systems were reviewed and were negative except as documented on the HPI.    Allergies: No active allergies    Medications:   Current Outpatient Medications:     aspirin 81 mg chewable tablet, Chew 1 tablet (81 mg total) every 24 hours, Disp: 90 tablet, Rfl: 3    atorvastatin (LIPITOR) 40 mg tablet, Take 1 tablet (40 mg total) by mouth daily, Disp: 90 tablet, Rfl: 3    Blood Glucose Calibration (OT ULTRA/FASTTK CNTRL SOLN) SOLN, Use as directed, Disp: , Rfl:     Comfort Touch Plus Lancets 30G MISC, TEST 3-4 TIMES A DAY AS DIRECTED, Disp: 300 each, Rfl: 3    ezetimibe (ZETIA) 10 mg tablet, TAKE 1 TABLET (10 MG TOTAL) BY MOUTH DAILY, Disp: 90 tablet, Rfl: 0    Insulin Pen Needle (Comfort EZ Pen Needles) 32G X 4 MM MISC, TEST 3-4 TIMES A DAY AS DIRECTED, Disp: 300 each, Rfl: 0    metFORMIN (GLUCOPHAGE) 500 mg tablet, Take 1 tablet (500 mg total) by mouth daily with breakfast ., Disp: 90 tablet, Rfl: 1    metoprolol succinate (TOPROL-XL) 25 mg 24 hr tablet, Take 1 tablet (25 mg total) by mouth daily, Disp: 90 tablet, Rfl: 3    olmesartan-hydrochlorothiazide (BENICAR HCT) 40-12.5 MG per tablet, TAKE 1 TABLET BY MOUTH DAILY, Disp: 90 tablet, Rfl: 1    OneTouch Ultra test strip, TEST 3-4 TIMES A DAY AS DIRECTED, Disp: 100 each, Rfl: 5    semaglutide, 2 mg/dose, (Ozempic, 2 MG/DOSE,) 8 mg/ mL injection pen, Inject 2 mg under the skin every 7 days (Patient taking differently: Inject 2 mg under the skin once a week), Disp: , Rfl:     traZODone (DESYREL) 50 mg tablet, TAKE 1 TABLET (50 MG TOTAL) BY MOUTH DAILY AT BEDTIME, Disp: 30 tablet, Rfl: 5    Alcohol Swabs (Comfort Touch Alcohol Prep) 70 % PADS, TEST 3-4 TIMES A DAY AS DIRECTED (Patient not taking: Reported on 4/29/2025), Disp: 100 each, Rfl: 5    finasteride (PROSCAR) 5 mg tablet, Take 5 mg by mouth daily (Patient not taking:  Reported on 4/29/2025), Disp: , Rfl:     Silodosin 8 MG CAPS, Take 8 mg by mouth daily (Patient not taking: Reported on 4/29/2025), Disp: , Rfl:     Past Medical History:   Diagnosis Date    CAD (coronary artery disease)     Coronary artery disease     Diabetes mellitus (HCC)     Diverticulosis     Hyperglycemia     Hyperlipidemia     Hypertension     Increased frequency of urination 12/11/2024    Need for shingles vaccine 09/07/2021    Obesity     Thrombocytopenia (HCC) 01/23/2016     Past Surgical History:   Procedure Laterality Date    CARDIOVASCULAR STRESS TEST  03/20/2013    COLONOSCOPY      4/8/2020:diverticulosis    COLONOSCOPY W/ POLYPECTOMY      2/6/17:colon,polyp      CORONARY ARTERY BYPASS GRAFT  01/21/2016    x3     Family History   Problem Relation Age of Onset    Hypertension Mother       reports that he quit smoking about 13 years ago. His smoking use included cigarettes. He started smoking about 61 years ago. He has a 7.2 pack-year smoking history. He has never used smokeless tobacco. He reports current alcohol use. He reports that he does not use drugs.      Physical Exam:   Vitals:    04/29/25 1147   BP: 140/80   BP Location: Left arm   Patient Position: Sitting   Cuff Size: Adult   Pulse: 72   SpO2: 96%   Weight: 81.6 kg (180 lb)     Body mass index is 31.89 kg/m².    General: conscious, cooperative, in not acute distress  Eyes: conjunctivae pink, anicteric sclerae  ENT: lips and mucous membranes moist  Neck: supple, no JVD  Chest: clear breath sounds bilateral, no crackles, ronchus or wheezings  CVS: distinct S1 & S2, normal rate, regular rhythm  Abdomen: soft, non-tender, non-distended, normoactive bowel sounds  Back: no CVA tenderness  Extremities: no edema of both legs  Skin: no rash  Neuro: awake, alert, oriented      Lab Results:   Results for orders placed or performed in visit on 02/13/25   CBC and differential   Result Value Ref Range    WBC 6.99 4.31 - 10.16 Thousand/uL    RBC 4.42 3.88  "- 5.62 Million/uL    Hemoglobin 13.6 12.0 - 17.0 g/dL    Hematocrit 41.1 36.5 - 49.3 %    MCV 93 82 - 98 fL    MCH 30.8 26.8 - 34.3 pg    MCHC 33.1 31.4 - 37.4 g/dL    RDW 12.9 11.6 - 15.1 %    MPV 10.9 8.9 - 12.7 fL    Platelets 173 149 - 390 Thousands/uL    nRBC 0 /100 WBCs    Segmented % 72 43 - 75 %    Immature Grans % 0 0 - 2 %    Lymphocytes % 14 14 - 44 %    Monocytes % 11 4 - 12 %    Eosinophils Relative 3 0 - 6 %    Basophils Relative 0 0 - 1 %    Absolute Neutrophils 4.95 1.85 - 7.62 Thousands/µL    Absolute Immature Grans 0.02 0.00 - 0.20 Thousand/uL    Absolute Lymphocytes 0.96 0.60 - 4.47 Thousands/µL    Absolute Monocytes 0.80 0.17 - 1.22 Thousand/µL    Eosinophils Absolute 0.23 0.00 - 0.61 Thousand/µL    Basophils Absolute 0.03 0.00 - 0.10 Thousands/µL   Comprehensive metabolic panel   Result Value Ref Range    Sodium 142 135 - 147 mmol/L    Potassium 4.5 3.5 - 5.3 mmol/L    Chloride 105 96 - 108 mmol/L    CO2 30 21 - 32 mmol/L    ANION GAP 7 4 - 13 mmol/L    BUN 23 5 - 25 mg/dL    Creatinine 1.45 (H) 0.60 - 1.30 mg/dL    Glucose, Fasting 95 65 - 99 mg/dL    Calcium 9.2 8.4 - 10.2 mg/dL    AST 22 13 - 39 U/L    ALT 38 7 - 52 U/L    Alkaline Phosphatase 83 34 - 104 U/L    Total Protein 6.7 6.4 - 8.4 g/dL    Albumin 4.0 3.5 - 5.0 g/dL    Total Bilirubin 0.48 0.20 - 1.00 mg/dL    eGFR 45 ml/min/1.73sq m               Portions of the record may have been created with voice recognition software. Occasional wrong word or \"sound a like\" substitutions may have occurred due to the inherent limitations of voice recognition software. Read the chart carefully and recognize, using context, where substitutions have occurred.If you have any questions, please contact the dictating provider.  "

## 2025-04-29 NOTE — ASSESSMENT & PLAN NOTE
Lab Results   Component Value Date    EGFR 48 (L) 02/20/2025    EGFR 45 02/13/2025    EGFR 49 06/19/2024    CREATININE 1.48 (H) 02/20/2025    CREATININE 1.45 (H) 02/13/2025    CREATININE 1.35 (H) 06/19/2024   Creatinine around 1.3-1.5 since 2019.  Overall kidney function is stable.  Follow-up in 6 months as above

## 2025-04-29 NOTE — ASSESSMENT & PLAN NOTE
BPH status post laser enucleation of the prostate on 3/3/2025 by urology at [facility]

## 2025-04-29 NOTE — PATIENT INSTRUCTIONS
As we discussed in the office visit you were seen today for evaluation of chronic kidney disease stage III.  Suspected secondary to long-term history of hypertension for more than 20 years, atherosclerotic disease, age-related nephron loss.  I would like to see you back in the office in 6 months with repeat labs.  Your kidney function remained fairly stable for several years.  Remember to follow low-salt diet.  Remember to have good diabetes control.  Okay to take Tylenol or acetaminophen as needed for pain.  Avoid NSAIDs (no ibuprofen, Motrin, Advil, Aleve, naproxen).  Continue close follow-up with primary care doctor, cardiologist, urologist.

## 2025-04-29 NOTE — ASSESSMENT & PLAN NOTE
Lab Results   Component Value Date    EGFR 48 (L) 02/20/2025    EGFR 45 02/13/2025    EGFR 49 06/19/2024    CREATININE 1.48 (H) 02/20/2025    CREATININE 1.45 (H) 02/13/2025    CREATININE 1.35 (H) 06/19/2024   CKD stage IIIa/B with creatinine fluctuating around 1.3-1.5 back since 2019.  CKD suspected secondary to hypertensive nephrosclerosis, patient reports history of hypertension for more than 20 years, also age-related nephron loss, noted diabetes as per patient's wife only for 3 years.  Renal function overall is stable.  Patient is on ARB  Plan to follow up with repeat labs in 6 months  Avoid NSAIDs.  Blood pressure acceptable.  No microalbuminuria

## 2025-04-29 NOTE — ASSESSMENT & PLAN NOTE
Lab Results   Component Value Date    HGBA1C 5.6 12/23/2024   Well-controlled.  Management as per primary care doctor

## 2025-04-29 NOTE — TELEPHONE ENCOUNTER
"Phone call from patient returning call to [place] (no notation in chart). Attempted to transfer to [place] was inform \"Teetee not at her desk\".    Informed patient to expect call from office.   "

## 2025-05-14 ENCOUNTER — OFFICE VISIT (OUTPATIENT)
Dept: FAMILY MEDICINE CLINIC | Facility: CLINIC | Age: 80
End: 2025-05-14
Payer: COMMERCIAL

## 2025-05-14 VITALS
HEART RATE: 72 BPM | BODY MASS INDEX: 31.54 KG/M2 | OXYGEN SATURATION: 98 % | DIASTOLIC BLOOD PRESSURE: 80 MMHG | HEIGHT: 63 IN | RESPIRATION RATE: 16 BRPM | TEMPERATURE: 98.1 F | WEIGHT: 178 LBS | SYSTOLIC BLOOD PRESSURE: 144 MMHG

## 2025-05-14 DIAGNOSIS — N18.31 TYPE 2 DIABETES MELLITUS WITH STAGE 3A CHRONIC KIDNEY DISEASE, WITHOUT LONG-TERM CURRENT USE OF INSULIN (HCC): Primary | ICD-10-CM

## 2025-05-14 DIAGNOSIS — R39.11 BENIGN PROSTATIC HYPERPLASIA WITH URINARY HESITANCY: ICD-10-CM

## 2025-05-14 DIAGNOSIS — E78.00 PURE HYPERCHOLESTEROLEMIA: ICD-10-CM

## 2025-05-14 DIAGNOSIS — I71.21 ANEURYSM OF ASCENDING AORTA WITHOUT RUPTURE (HCC): ICD-10-CM

## 2025-05-14 DIAGNOSIS — N40.1 BENIGN PROSTATIC HYPERPLASIA WITH URINARY HESITANCY: ICD-10-CM

## 2025-05-14 DIAGNOSIS — E11.22 TYPE 2 DIABETES MELLITUS WITH STAGE 3A CHRONIC KIDNEY DISEASE, WITHOUT LONG-TERM CURRENT USE OF INSULIN (HCC): Primary | ICD-10-CM

## 2025-05-14 DIAGNOSIS — I25.10 CORONARY ARTERY DISEASE INVOLVING NATIVE CORONARY ARTERY OF NATIVE HEART WITHOUT ANGINA PECTORIS: ICD-10-CM

## 2025-05-14 LAB — SL AMB POCT HEMOGLOBIN AIC: 5.7 (ref ?–6.5)

## 2025-05-14 PROCEDURE — 99214 OFFICE O/P EST MOD 30 MIN: CPT | Performed by: FAMILY MEDICINE

## 2025-05-14 PROCEDURE — 83036 HEMOGLOBIN GLYCOSYLATED A1C: CPT | Performed by: FAMILY MEDICINE

## 2025-05-14 RX ORDER — DAPAGLIFLOZIN 5 MG/1
5 TABLET, FILM COATED ORAL DAILY
Qty: 30 TABLET | Refills: 5 | Status: SHIPPED | OUTPATIENT
Start: 2025-05-14 | End: 2025-05-14 | Stop reason: CLARIF

## 2025-05-14 RX ORDER — ASPIRIN 81 MG/1
81 TABLET, CHEWABLE ORAL EVERY 24 HOURS
Qty: 90 TABLET | Refills: 3 | Status: SHIPPED | OUTPATIENT
Start: 2025-05-14

## 2025-05-14 RX ORDER — FINASTERIDE 5 MG/1
5 TABLET, FILM COATED ORAL DAILY
Qty: 30 TABLET | Refills: 11 | Status: SHIPPED | OUTPATIENT
Start: 2025-05-14 | End: 2026-05-19

## 2025-05-14 RX ORDER — DAPAGLIFLOZIN 5 MG/1
5 TABLET, FILM COATED ORAL DAILY
Qty: 30 TABLET | Refills: 5 | Status: SHIPPED | OUTPATIENT
Start: 2025-05-14

## 2025-05-14 NOTE — PROGRESS NOTES
Name: Neel Verma      : 1945      MRN: [id number]  Encounter Provider: Miles Ruiz MD  Encounter Date: 2025   Encounter department: [facility] CARE [facility]    Assessment & Plan  Type 2 diabetes mellitus with stage 3a chronic kidney disease, without long-term current use of insulin (HCC)    Lab Results   Component Value Date    HGBA1C 5.6 2024       Orders:    semaglutide, 2 mg/dose, (Ozempic, 2 MG/DOSE,) 8 mg/ mL injection pen; Inject 0.75 mL (2 mg total) under the skin once a week    dapagliflozin (Farxiga) 5 MG TABS; Take 1 tablet (5 mg total) by mouth daily    Pure hypercholesterolemia    Orders:    Lipid Panel with Direct LDL reflex; Future    Aneurysm of ascending aorta without rupture (HCC)    Orders:    CT chest wo contrast; Future    Benign prostatic hyperplasia with urinary hesitancy    Orders:    finasteride (PROSCAR) 5 mg tablet; Take 1 tablet (5 mg total) by mouth daily    Coronary artery disease involving native coronary artery of native heart without angina pectoris    Orders:    aspirin 81 mg chewable tablet; Chew 1 tablet (81 mg total) every 24 hours         Follow-up Visit: Diabetes, Hypertension, and Aortic Aneurysm Management    Assessment and Plan:  1. Diabetes Mellitus Type 2:  - HbA1c increased from 5.6% (Dec 2024) to 6.5%  - Discontinuing Metformin due to decreased renal function  - Starting Farxiga  - Encouraged increased physical activity    2. Hypertension:  - /80, goal <130/80  - Continue current regimen:  * Olmesartan/HCTZ  * Metoprolol 25mg    3. Aortic Aneurysm:  - Current size 4.1cm, surgical intervention indicated if >5.3cm  - Ordered chest imaging for follow-up    4. Benign Prostatic Hyperplasia:  - Patient reports urinary retention symptoms  - Discontinuing Silodosin  - Continue Finasteride only    5. Follow-up:  - Labs: Lipid panel  - Chest imaging for aortic aneurysm surveillance  - Return visit in 3  "months    Subjective:  Patient presents for routine follow-up of multiple chronic conditions. Reports some urinary retention symptoms after discontinuing prescribed medications. Has been maintaining physical activity through yard work, which provides regular exercise and induces sweating. No complaints of chest pain or shortness of breath. Patient had previously discontinued both Silodosin and Finasteride without consultation. No other new concerns reported.    Objective:  Vital Signs:/80 (BP Location: Left arm, Patient Position: Sitting, Cuff Size: Standard)   Pulse 72   Temp 98.1 °F (36.7 °C) (Tympanic)   Resp 16   Ht 5' 3\" (1.6 m)   Wt 80.7 kg (178 lb)   SpO2 98%   BMI 31.53 kg/m²     The patient appears to without distress; Body mass index is 31.53 kg/m². HEENT is unremarkable, the neck has no masses or enlarged lymph nodes. Respiratory effort is normal. Lung fields are clear; the heart has a normal rhythm without murmurs. Abdomen soft, protuberant without abnormalities;  Neurological with no focal deficits.        - Blood Pressure: 144/80 mmHg    Laboratory Results:  - HbA1c: 6.5% (increased from 5.6% in December 2024)  - GFR: Currently 48 (previously fluctuated from 49 to 45)    Imaging:  - Aortic aneurysm measurement: 4.1cm        "

## 2025-05-15 ENCOUNTER — TELEPHONE (OUTPATIENT)
Age: 80
End: 2025-05-15

## 2025-05-15 NOTE — TELEPHONE ENCOUNTER
PA for Ozempic 2MG/DOSE 8mg/3mL APPROVED     Date(s) approved 05/15/2025-12/31/2025    Case #[id number]     Patient advised by          []MyChart Message  []Phone call   []LMOM  []L/M to call office as no active Communication consent on file  [x]Unable to leave detailed message as VM not approved on Communication consent       Pharmacy advised by    [x]Fax  []Phone call  []Secure Chat  Approval letter scanned into Media Yes

## 2025-05-15 NOTE — TELEPHONE ENCOUNTER
PA for Ozempic 2MG/DOSE 8mg/3mL SUBMITTED to [employer]    via    []CMM-KEY:   [x]Surescripts-Case ID # [id number]   []Availity-Auth ID # NDC #   []Faxed to plan   []Other website   []Phone call Case ID #     [x]PA sent as URGENT    All office notes, labs and other pertaining documents and studies sent. Clinical questions answered. Awaiting determination from insurance company.     Turnaround time for your insurance to make a decision on your Prior Authorization can take 7-21 business days.

## 2025-05-20 ENCOUNTER — HOSPITAL ENCOUNTER (OUTPATIENT)
Dept: CT IMAGING | Facility: HOSPITAL | Age: 80
Discharge: HOME | End: 2025-05-20
Attending: FAMILY MEDICINE
Payer: COMMERCIAL

## 2025-05-20 ENCOUNTER — APPOINTMENT (OUTPATIENT)
Dept: LAB | Facility: HOSPITAL | Age: 80
End: 2025-05-20
Payer: COMMERCIAL

## 2025-05-20 DIAGNOSIS — E78.00 PURE HYPERCHOLESTEROLEMIA: ICD-10-CM

## 2025-05-20 DIAGNOSIS — I71.21 ANEURYSM OF ASCENDING AORTA WITHOUT RUPTURE (HCC): ICD-10-CM

## 2025-05-20 LAB
CHOLEST SERPL-MCNC: 88 MG/DL (ref ?–200)
HDLC SERPL-MCNC: 34 MG/DL
LDLC SERPL CALC-MCNC: 38 MG/DL (ref 0–100)
TRIGL SERPL-MCNC: 78 MG/DL (ref ?–150)

## 2025-05-20 PROCEDURE — 36415 COLL VENOUS BLD VENIPUNCTURE: CPT

## 2025-05-20 PROCEDURE — 71250 CT THORAX DX C-: CPT

## 2025-05-20 PROCEDURE — 80061 LIPID PANEL: CPT

## 2025-05-23 ENCOUNTER — RESULTS FOLLOW-UP (OUTPATIENT)
Dept: FAMILY MEDICINE CLINIC | Facility: CLINIC | Age: 80
End: 2025-05-23

## 2025-05-29 NOTE — RESULT ENCOUNTER NOTE
The CT of the chest showed the aneurysm is stable.  Also gallstones are present.  If he does not pain in the abdomen nothing is needed to do.

## 2025-06-05 DIAGNOSIS — I10 ESSENTIAL HYPERTENSION: ICD-10-CM

## 2025-06-05 RX ORDER — OLMESARTAN MEDOXOMIL AND HYDROCHLOROTHIAZIDE 40/12.5 40; 12.5 MG/1; MG/1
1 TABLET ORAL DAILY
Qty: 90 TABLET | Refills: 1 | Status: SHIPPED | OUTPATIENT
Start: 2025-06-05

## 2025-07-21 DIAGNOSIS — E11.65 TYPE 2 DIABETES MELLITUS WITH HYPERGLYCEMIA, WITHOUT LONG-TERM CURRENT USE OF INSULIN (HCC): ICD-10-CM

## 2025-07-22 RX ORDER — BLOOD SUGAR DIAGNOSTIC
STRIP MISCELLANEOUS
Qty: 100 EACH | Refills: 5 | Status: SHIPPED | OUTPATIENT
Start: 2025-07-22

## 2025-08-19 DIAGNOSIS — E11.22 TYPE 2 DIABETES MELLITUS WITH STAGE 3A CHRONIC KIDNEY DISEASE, WITHOUT LONG-TERM CURRENT USE OF INSULIN (HCC): ICD-10-CM

## 2025-08-19 DIAGNOSIS — E11.65 TYPE 2 DIABETES MELLITUS WITH HYPERGLYCEMIA, WITHOUT LONG-TERM CURRENT USE OF INSULIN (HCC): ICD-10-CM

## 2025-08-19 DIAGNOSIS — N18.31 TYPE 2 DIABETES MELLITUS WITH STAGE 3A CHRONIC KIDNEY DISEASE, WITHOUT LONG-TERM CURRENT USE OF INSULIN (HCC): ICD-10-CM

## 2025-08-21 ENCOUNTER — OFFICE VISIT (OUTPATIENT)
Dept: FAMILY MEDICINE CLINIC | Facility: CLINIC | Age: 80
End: 2025-08-21
Payer: COMMERCIAL

## 2025-08-21 VITALS
WEIGHT: 172 LBS | OXYGEN SATURATION: 98 % | BODY MASS INDEX: 30.48 KG/M2 | DIASTOLIC BLOOD PRESSURE: 81 MMHG | RESPIRATION RATE: 16 BRPM | HEART RATE: 72 BPM | SYSTOLIC BLOOD PRESSURE: 139 MMHG | TEMPERATURE: 98.1 F | HEIGHT: 63 IN

## 2025-08-21 DIAGNOSIS — N18.31 TYPE 2 DIABETES MELLITUS WITH STAGE 3A CHRONIC KIDNEY DISEASE, WITHOUT LONG-TERM CURRENT USE OF INSULIN (HCC): ICD-10-CM

## 2025-08-21 DIAGNOSIS — E78.00 PURE HYPERCHOLESTEROLEMIA: ICD-10-CM

## 2025-08-21 DIAGNOSIS — M25.561 ACUTE PAIN OF BOTH KNEES: Primary | ICD-10-CM

## 2025-08-21 DIAGNOSIS — M25.562 ACUTE PAIN OF BOTH KNEES: Primary | ICD-10-CM

## 2025-08-21 DIAGNOSIS — I25.10 CORONARY ARTERY DISEASE INVOLVING NATIVE CORONARY ARTERY OF NATIVE HEART WITHOUT ANGINA PECTORIS: ICD-10-CM

## 2025-08-21 DIAGNOSIS — I10 ESSENTIAL HYPERTENSION: ICD-10-CM

## 2025-08-21 DIAGNOSIS — E11.22 TYPE 2 DIABETES MELLITUS WITH STAGE 3A CHRONIC KIDNEY DISEASE, WITHOUT LONG-TERM CURRENT USE OF INSULIN (HCC): ICD-10-CM

## 2025-08-21 DIAGNOSIS — N52.9 ERECTILE DYSFUNCTION, UNSPECIFIED ERECTILE DYSFUNCTION TYPE: ICD-10-CM

## 2025-08-21 DIAGNOSIS — N40.1 BENIGN PROSTATIC HYPERPLASIA WITH URINARY HESITANCY: ICD-10-CM

## 2025-08-21 DIAGNOSIS — F51.01 PRIMARY INSOMNIA: ICD-10-CM

## 2025-08-21 DIAGNOSIS — H90.3 SENSORINEURAL HEARING LOSS (SNHL) OF BOTH EARS: ICD-10-CM

## 2025-08-21 DIAGNOSIS — R39.11 BENIGN PROSTATIC HYPERPLASIA WITH URINARY HESITANCY: ICD-10-CM

## 2025-08-21 PROBLEM — E11.9 TYPE 2 DIABETES MELLITUS WITHOUT COMPLICATION, WITHOUT LONG-TERM CURRENT USE OF INSULIN (HCC): Status: RESOLVED | Noted: 2020-02-18 | Resolved: 2025-08-21

## 2025-08-21 PROBLEM — E11.65 TYPE 2 DIABETES MELLITUS WITH HYPERGLYCEMIA, WITHOUT LONG-TERM CURRENT USE OF INSULIN (HCC): Status: ACTIVE | Noted: 2025-08-21

## 2025-08-21 PROCEDURE — 99215 OFFICE O/P EST HI 40 MIN: CPT | Performed by: FAMILY MEDICINE

## 2025-08-21 RX ORDER — TRAZODONE HYDROCHLORIDE 100 MG/1
100 TABLET ORAL
Qty: 30 TABLET | Refills: 5 | Status: SHIPPED | OUTPATIENT
Start: 2025-08-21

## 2025-08-21 RX ORDER — EZETIMIBE 10 MG/1
10 TABLET ORAL DAILY
Qty: 90 TABLET | Refills: 0 | Status: SHIPPED | OUTPATIENT
Start: 2025-08-21

## 2025-08-21 RX ORDER — DAPAGLIFLOZIN 5 MG/1
5 TABLET, FILM COATED ORAL DAILY
Qty: 30 TABLET | Refills: 5 | Status: SHIPPED | OUTPATIENT
Start: 2025-08-21

## 2025-08-21 RX ORDER — BLOOD SUGAR DIAGNOSTIC
STRIP MISCELLANEOUS
Qty: 100 EACH | Refills: 5 | Status: SHIPPED | OUTPATIENT
Start: 2025-08-21

## 2025-08-21 RX ORDER — METOPROLOL SUCCINATE 25 MG/1
25 TABLET, EXTENDED RELEASE ORAL DAILY
Qty: 90 TABLET | Refills: 3 | Status: SHIPPED | OUTPATIENT
Start: 2025-08-21 | End: 2026-08-16

## 2025-08-21 RX ORDER — ATORVASTATIN CALCIUM 40 MG/1
40 TABLET, FILM COATED ORAL DAILY
Qty: 90 TABLET | Refills: 3 | Status: SHIPPED | OUTPATIENT
Start: 2025-08-21

## 2025-08-21 RX ORDER — FINASTERIDE 5 MG/1
5 TABLET, FILM COATED ORAL DAILY
Qty: 30 TABLET | Refills: 11 | Status: SHIPPED | OUTPATIENT
Start: 2025-08-21 | End: 2025-08-21 | Stop reason: ALTCHOICE

## 2025-08-21 RX ORDER — ASPIRIN 81 MG/1
81 TABLET, CHEWABLE ORAL EVERY 24 HOURS
Qty: 90 TABLET | Refills: 3 | Status: SHIPPED | OUTPATIENT
Start: 2025-08-21